# Patient Record
Sex: MALE | Race: WHITE | Employment: OTHER | URBAN - METROPOLITAN AREA
[De-identification: names, ages, dates, MRNs, and addresses within clinical notes are randomized per-mention and may not be internally consistent; named-entity substitution may affect disease eponyms.]

---

## 2016-07-21 LAB
CHOLESTEROL, TOTAL: 182 MG/DL
CHOLESTEROL/HDL RATIO: 5.1
HDLC SERPL-MCNC: 36 MG/DL (ref 35–70)
LDL CHOLESTEROL CALCULATED: 108 MG/DL (ref 0–160)
TRIGL SERPL-MCNC: 191 MG/DL
VLDLC SERPL CALC-MCNC: 38 MG/DL

## 2017-01-20 LAB
ALBUMIN SERPL-MCNC: 4.3 G/DL
ALP BLD-CCNC: 72 U/L
ALT SERPL-CCNC: 17 U/L
AST SERPL-CCNC: 16 U/L
BILIRUB SERPL-MCNC: 0.3 MG/DL (ref 0.1–1.4)
BUN BLDV-MCNC: 15 MG/DL
CALCIUM SERPL-MCNC: 9.8 MG/DL
CHLORIDE BLD-SCNC: 104 MMOL/L
CHOLESTEROL, TOTAL: 175 MG/DL
CHOLESTEROL/HDL RATIO: 5.47
CO2: 23 MMOL/L
CREAT SERPL-MCNC: 0.9 MG/DL
CREATININE, URINE: 147.5
GFR CALCULATED: 60
GLUCOSE BLD-MCNC: 191 MG/DL
HBA1C MFR BLD: 7.4 %
HDLC SERPL-MCNC: 32 MG/DL (ref 35–70)
LDL CHOLESTEROL CALCULATED: 87 MG/DL (ref 0–160)
MICROALBUMIN/CREAT 24H UR: 18.6 MG/G{CREAT}
MICROALBUMIN/CREAT UR-RTO: 13
POTASSIUM SERPL-SCNC: 4.4 MMOL/L
SODIUM BLD-SCNC: 141 MMOL/L
TOTAL PROTEIN: 6.9
TRIGL SERPL-MCNC: 282 MG/DL
VLDLC SERPL CALC-MCNC: 56 MG/DL

## 2017-01-24 DIAGNOSIS — E78.2 MIXED HYPERLIPIDEMIA: ICD-10-CM

## 2017-01-24 DIAGNOSIS — I10 ESSENTIAL HYPERTENSION: ICD-10-CM

## 2017-01-24 DIAGNOSIS — E11.9 TYPE 2 DIABETES MELLITUS WITHOUT COMPLICATION, WITHOUT LONG-TERM CURRENT USE OF INSULIN (HCC): ICD-10-CM

## 2017-01-27 ENCOUNTER — OFFICE VISIT (OUTPATIENT)
Dept: FAMILY MEDICINE CLINIC | Age: 72
End: 2017-01-27

## 2017-01-27 VITALS
TEMPERATURE: 97.6 F | HEART RATE: 84 BPM | WEIGHT: 231.2 LBS | HEIGHT: 73 IN | RESPIRATION RATE: 16 BRPM | DIASTOLIC BLOOD PRESSURE: 72 MMHG | BODY MASS INDEX: 30.64 KG/M2 | SYSTOLIC BLOOD PRESSURE: 130 MMHG

## 2017-01-27 DIAGNOSIS — E78.2 MIXED HYPERLIPIDEMIA: ICD-10-CM

## 2017-01-27 DIAGNOSIS — E11.9 TYPE 2 DIABETES MELLITUS WITHOUT COMPLICATION, WITHOUT LONG-TERM CURRENT USE OF INSULIN (HCC): Primary | ICD-10-CM

## 2017-01-27 DIAGNOSIS — R80.9 MICROALBUMINURIA: ICD-10-CM

## 2017-01-27 DIAGNOSIS — I10 ESSENTIAL HYPERTENSION: ICD-10-CM

## 2017-01-27 PROCEDURE — 99214 OFFICE O/P EST MOD 30 MIN: CPT | Performed by: FAMILY MEDICINE

## 2017-01-27 RX ORDER — OMEPRAZOLE 40 MG/1
40 CAPSULE, DELAYED RELEASE ORAL DAILY
Qty: 30 CAPSULE | Refills: 5 | Status: SHIPPED | OUTPATIENT
Start: 2017-01-27 | End: 2017-04-27 | Stop reason: SDUPTHER

## 2017-02-13 RX ORDER — ETODOLAC 400 MG/1
TABLET, FILM COATED ORAL
Qty: 60 TABLET | Refills: 3 | OUTPATIENT
Start: 2017-02-13

## 2017-04-21 LAB
ALBUMIN SERPL-MCNC: 4.3 G/DL
ALP BLD-CCNC: 62 U/L
ALT SERPL-CCNC: 21 U/L
AST SERPL-CCNC: 19 U/L
BILIRUB SERPL-MCNC: 0.4 MG/DL (ref 0.1–1.4)
BUN BLDV-MCNC: 16 MG/DL
CALCIUM SERPL-MCNC: 9.4 MG/DL
CHLORIDE BLD-SCNC: 103 MMOL/L
CHOLESTEROL, TOTAL: 194 MG/DL
CHOLESTEROL/HDL RATIO: 5.71
CO2: 24 MMOL/L
CREAT SERPL-MCNC: 0.96 MG/DL
GFR CALCULATED: >60
GLUCOSE BLD-MCNC: 173 MG/DL
HBA1C MFR BLD: 7.4 %
HDLC SERPL-MCNC: 34 MG/DL (ref 35–70)
LDL CHOLESTEROL CALCULATED: 109 MG/DL (ref 0–160)
POTASSIUM SERPL-SCNC: 4.4 MMOL/L
SODIUM BLD-SCNC: 140 MMOL/L
TOTAL PROTEIN: 6.8
TRIGL SERPL-MCNC: 256 MG/DL
VLDLC SERPL CALC-MCNC: 51 MG/DL

## 2017-04-25 DIAGNOSIS — I10 ESSENTIAL HYPERTENSION: ICD-10-CM

## 2017-04-25 DIAGNOSIS — E78.2 MIXED HYPERLIPIDEMIA: ICD-10-CM

## 2017-04-25 DIAGNOSIS — E11.9 TYPE 2 DIABETES MELLITUS WITHOUT COMPLICATION, WITHOUT LONG-TERM CURRENT USE OF INSULIN (HCC): ICD-10-CM

## 2017-04-27 ENCOUNTER — OFFICE VISIT (OUTPATIENT)
Dept: FAMILY MEDICINE CLINIC | Age: 72
End: 2017-04-27

## 2017-04-27 VITALS
HEART RATE: 76 BPM | DIASTOLIC BLOOD PRESSURE: 70 MMHG | SYSTOLIC BLOOD PRESSURE: 122 MMHG | RESPIRATION RATE: 16 BRPM | HEIGHT: 73 IN | TEMPERATURE: 97.7 F | WEIGHT: 235.8 LBS | BODY MASS INDEX: 31.25 KG/M2

## 2017-04-27 DIAGNOSIS — E78.2 MIXED HYPERLIPIDEMIA: ICD-10-CM

## 2017-04-27 DIAGNOSIS — M54.12 CERVICAL RADICULOPATHY: ICD-10-CM

## 2017-04-27 DIAGNOSIS — I10 ESSENTIAL HYPERTENSION: ICD-10-CM

## 2017-04-27 DIAGNOSIS — E11.9 TYPE 2 DIABETES MELLITUS WITHOUT COMPLICATION, WITHOUT LONG-TERM CURRENT USE OF INSULIN (HCC): Primary | ICD-10-CM

## 2017-04-27 PROCEDURE — 99214 OFFICE O/P EST MOD 30 MIN: CPT | Performed by: FAMILY MEDICINE

## 2017-04-27 RX ORDER — ETODOLAC 400 MG/1
400 TABLET, FILM COATED ORAL 2 TIMES DAILY
COMMUNITY
Start: 2017-04-16

## 2017-04-27 RX ORDER — OMEPRAZOLE 40 MG/1
40 CAPSULE, DELAYED RELEASE ORAL DAILY
Qty: 30 CAPSULE | Refills: 5 | Status: SHIPPED | OUTPATIENT
Start: 2017-04-27 | End: 2017-10-26 | Stop reason: SDUPTHER

## 2017-04-27 RX ORDER — ALLOPURINOL 300 MG/1
300 TABLET ORAL DAILY
Qty: 30 TABLET | Refills: 5 | Status: SHIPPED | OUTPATIENT
Start: 2017-04-27 | End: 2017-10-26 | Stop reason: SDUPTHER

## 2017-04-27 RX ORDER — PRAVASTATIN SODIUM 80 MG/1
80 TABLET ORAL DAILY
Qty: 90 TABLET | Refills: 2 | Status: SHIPPED | OUTPATIENT
Start: 2017-04-27 | End: 2018-02-26 | Stop reason: SDUPTHER

## 2017-04-27 RX ORDER — NIACIN 1000 MG/1
1000 TABLET, EXTENDED RELEASE ORAL NIGHTLY
Qty: 30 TABLET | Refills: 5 | Status: SHIPPED | OUTPATIENT
Start: 2017-04-27 | End: 2017-10-26 | Stop reason: SDUPTHER

## 2017-04-27 RX ORDER — FENOFIBRATE 160 MG/1
160 TABLET ORAL DAILY
Qty: 30 TABLET | Refills: 5 | Status: SHIPPED | OUTPATIENT
Start: 2017-04-27 | End: 2017-10-26 | Stop reason: SDUPTHER

## 2017-04-27 RX ORDER — LISINOPRIL 2.5 MG/1
TABLET ORAL
Qty: 90 TABLET | Refills: 1 | Status: SHIPPED | OUTPATIENT
Start: 2017-04-27 | End: 2017-10-26 | Stop reason: SDUPTHER

## 2017-04-27 ASSESSMENT — PATIENT HEALTH QUESTIONNAIRE - PHQ9
1. LITTLE INTEREST OR PLEASURE IN DOING THINGS: 0
2. FEELING DOWN, DEPRESSED OR HOPELESS: 0
SUM OF ALL RESPONSES TO PHQ QUESTIONS 1-9: 0
SUM OF ALL RESPONSES TO PHQ9 QUESTIONS 1 & 2: 0

## 2017-05-02 ENCOUNTER — HOSPITAL ENCOUNTER (OUTPATIENT)
Dept: PHYSICAL THERAPY | Age: 72
Setting detail: THERAPIES SERIES
Discharge: HOME | End: 2017-05-02
Payer: COMMERCIAL

## 2017-05-02 PROCEDURE — G8984 CARRY CURRENT STATUS: HCPCS

## 2017-05-02 PROCEDURE — G8985 CARRY GOAL STATUS: HCPCS

## 2017-05-02 PROCEDURE — 97162 PT EVAL MOD COMPLEX 30 MIN: CPT

## 2017-05-02 ASSESSMENT — PAIN DESCRIPTION - PAIN TYPE: TYPE: CHRONIC PAIN

## 2017-05-02 ASSESSMENT — PAIN DESCRIPTION - ORIENTATION: ORIENTATION: LEFT

## 2017-05-02 ASSESSMENT — PAIN SCALES - GENERAL: PAINLEVEL_OUTOF10: 2

## 2017-05-02 ASSESSMENT — PAIN DESCRIPTION - LOCATION: LOCATION: NECK;SHOULDER

## 2017-05-02 ASSESSMENT — PAIN DESCRIPTION - DESCRIPTORS: DESCRIPTORS: ACHING

## 2017-05-16 ENCOUNTER — HOSPITAL ENCOUNTER (OUTPATIENT)
Dept: PHYSICAL THERAPY | Age: 72
Discharge: HOME | End: 2017-05-16

## 2017-05-18 ENCOUNTER — CLINICAL DOCUMENTATION (OUTPATIENT)
Dept: PHYSICAL THERAPY | Age: 72
End: 2017-05-18

## 2017-05-23 ENCOUNTER — CLINICAL DOCUMENTATION (OUTPATIENT)
Dept: PHYSICAL THERAPY | Age: 72
End: 2017-05-23

## 2017-06-27 RX ORDER — GLIPIZIDE 10 MG/1
TABLET ORAL
Qty: 60 TABLET | OUTPATIENT
Start: 2017-06-27

## 2017-06-27 RX ORDER — PRAVASTATIN SODIUM 80 MG/1
TABLET ORAL
Qty: 90 TABLET | OUTPATIENT
Start: 2017-06-27

## 2017-06-30 RX ORDER — GLIPIZIDE 10 MG/1
TABLET ORAL
Qty: 60 TABLET | Refills: 8 | Status: SHIPPED | OUTPATIENT
Start: 2017-06-30 | End: 2018-02-26 | Stop reason: SDUPTHER

## 2017-07-21 LAB
ALBUMIN SERPL-MCNC: 4.1 G/DL
ALP BLD-CCNC: 58 U/L
ALT SERPL-CCNC: 19 U/L
ANION GAP SERPL CALCULATED.3IONS-SCNC: 15 MMOL/L
AST SERPL-CCNC: 16 U/L
AVERAGE GLUCOSE: 155
BILIRUB SERPL-MCNC: 0.9 MG/DL (ref 0.1–1.4)
BUN BLDV-MCNC: NORMAL MG/DL
CALCIUM SERPL-MCNC: 9.9 MG/DL
CHLORIDE BLD-SCNC: 104 MMOL/L
CHOLESTEROL, TOTAL: 182 MG/DL
CHOLESTEROL/HDL RATIO: 5.05
CO2: 26 MMOL/L
CREAT SERPL-MCNC: 1.26 MG/DL
GFR CALCULATED: 57
GLUCOSE BLD-MCNC: 155 MG/DL
HBA1C MFR BLD: 7.3 %
HDLC SERPL-MCNC: 36 MG/DL (ref 35–70)
LDL CHOLESTEROL CALCULATED: 108 MG/DL (ref 0–160)
POTASSIUM SERPL-SCNC: 3.9 MMOL/L
SODIUM BLD-SCNC: 141 MMOL/L
TOTAL PROTEIN: 6.6
TRIGL SERPL-MCNC: 191 MG/DL
VLDLC SERPL CALC-MCNC: 38 MG/DL

## 2017-07-24 DIAGNOSIS — E78.2 MIXED HYPERLIPIDEMIA: ICD-10-CM

## 2017-07-24 DIAGNOSIS — E11.9 TYPE 2 DIABETES MELLITUS WITHOUT COMPLICATION, WITHOUT LONG-TERM CURRENT USE OF INSULIN (HCC): ICD-10-CM

## 2017-07-24 DIAGNOSIS — I10 ESSENTIAL HYPERTENSION: ICD-10-CM

## 2017-07-28 ENCOUNTER — OFFICE VISIT (OUTPATIENT)
Dept: FAMILY MEDICINE CLINIC | Age: 72
End: 2017-07-28

## 2017-07-28 VITALS
HEIGHT: 73 IN | WEIGHT: 235.6 LBS | HEART RATE: 84 BPM | SYSTOLIC BLOOD PRESSURE: 128 MMHG | BODY MASS INDEX: 31.23 KG/M2 | DIASTOLIC BLOOD PRESSURE: 66 MMHG | TEMPERATURE: 97.2 F | RESPIRATION RATE: 18 BRPM

## 2017-07-28 DIAGNOSIS — E78.2 MIXED HYPERLIPIDEMIA: ICD-10-CM

## 2017-07-28 DIAGNOSIS — I10 ESSENTIAL HYPERTENSION: ICD-10-CM

## 2017-07-28 DIAGNOSIS — E11.9 TYPE 2 DIABETES MELLITUS WITHOUT COMPLICATION, WITHOUT LONG-TERM CURRENT USE OF INSULIN (HCC): Primary | ICD-10-CM

## 2017-07-28 PROCEDURE — 3288F FALL RISK ASSESSMENT DOCD: CPT | Performed by: FAMILY MEDICINE

## 2017-07-28 PROCEDURE — G8510 SCR DEP NEG, NO PLAN REQD: HCPCS | Performed by: FAMILY MEDICINE

## 2017-07-28 PROCEDURE — 99214 OFFICE O/P EST MOD 30 MIN: CPT | Performed by: FAMILY MEDICINE

## 2017-07-28 ASSESSMENT — PATIENT HEALTH QUESTIONNAIRE - PHQ9
SUM OF ALL RESPONSES TO PHQ QUESTIONS 1-9: 0
2. FEELING DOWN, DEPRESSED OR HOPELESS: 0
1. LITTLE INTEREST OR PLEASURE IN DOING THINGS: 0
SUM OF ALL RESPONSES TO PHQ9 QUESTIONS 1 & 2: 0

## 2017-10-12 LAB
ALBUMIN SERPL-MCNC: 4.1 G/DL
ALP BLD-CCNC: 67 U/L
ALT SERPL-CCNC: 22 U/L
ANION GAP SERPL CALCULATED.3IONS-SCNC: 12 MMOL/L
AST SERPL-CCNC: 18 U/L
AVERAGE GLUCOSE: NORMAL
BILIRUB SERPL-MCNC: 0.7 MG/DL (ref 0.1–1.4)
BUN BLDV-MCNC: NORMAL MG/DL
CALCIUM SERPL-MCNC: 9.7 MG/DL
CHLORIDE BLD-SCNC: 104 MMOL/L
CHOLESTEROL, TOTAL: 195 MG/DL
CHOLESTEROL/HDL RATIO: 7
CO2: NORMAL MMOL/L
CREAT SERPL-MCNC: 0.9 MG/DL
GFR CALCULATED: >60
GLUCOSE BLD-MCNC: 207 MG/DL
HBA1C MFR BLD: 7.1 %
HDLC SERPL-MCNC: 28 MG/DL (ref 35–70)
LDL CHOLESTEROL CALCULATED: 109 MG/DL (ref 0–160)
POTASSIUM SERPL-SCNC: 3.9 MMOL/L
SODIUM BLD-SCNC: 139 MMOL/L
TOTAL PROTEIN: 6.6
TRIGL SERPL-MCNC: 438 MG/DL
VLDLC SERPL CALC-MCNC: ABNORMAL MG/DL

## 2017-10-13 DIAGNOSIS — I10 ESSENTIAL HYPERTENSION: ICD-10-CM

## 2017-10-13 DIAGNOSIS — E11.9 TYPE 2 DIABETES MELLITUS WITHOUT COMPLICATION, WITHOUT LONG-TERM CURRENT USE OF INSULIN (HCC): ICD-10-CM

## 2017-10-13 DIAGNOSIS — E78.2 MIXED HYPERLIPIDEMIA: ICD-10-CM

## 2017-10-26 ENCOUNTER — OFFICE VISIT (OUTPATIENT)
Dept: FAMILY MEDICINE CLINIC | Age: 72
End: 2017-10-26

## 2017-10-26 VITALS
HEART RATE: 74 BPM | BODY MASS INDEX: 31.54 KG/M2 | TEMPERATURE: 97.1 F | WEIGHT: 238 LBS | RESPIRATION RATE: 14 BRPM | DIASTOLIC BLOOD PRESSURE: 70 MMHG | HEIGHT: 73 IN | SYSTOLIC BLOOD PRESSURE: 138 MMHG

## 2017-10-26 DIAGNOSIS — R80.9 MICROALBUMINURIA: ICD-10-CM

## 2017-10-26 DIAGNOSIS — I10 ESSENTIAL HYPERTENSION: ICD-10-CM

## 2017-10-26 DIAGNOSIS — Z23 NEED FOR INFLUENZA VACCINATION: ICD-10-CM

## 2017-10-26 DIAGNOSIS — E78.2 MIXED HYPERLIPIDEMIA: ICD-10-CM

## 2017-10-26 DIAGNOSIS — E11.9 TYPE 2 DIABETES MELLITUS WITHOUT COMPLICATION, WITHOUT LONG-TERM CURRENT USE OF INSULIN (HCC): Primary | ICD-10-CM

## 2017-10-26 PROCEDURE — 90471 IMMUNIZATION ADMIN: CPT | Performed by: FAMILY MEDICINE

## 2017-10-26 PROCEDURE — 90662 IIV NO PRSV INCREASED AG IM: CPT | Performed by: FAMILY MEDICINE

## 2017-10-26 PROCEDURE — 99214 OFFICE O/P EST MOD 30 MIN: CPT | Performed by: FAMILY MEDICINE

## 2017-10-26 RX ORDER — ALLOPURINOL 300 MG/1
300 TABLET ORAL DAILY
Qty: 30 TABLET | Refills: 5 | Status: SHIPPED | OUTPATIENT
Start: 2017-10-26 | End: 2018-02-26 | Stop reason: SDUPTHER

## 2017-10-26 RX ORDER — OMEPRAZOLE 40 MG/1
40 CAPSULE, DELAYED RELEASE ORAL DAILY
Qty: 30 CAPSULE | Refills: 5 | Status: SHIPPED | OUTPATIENT
Start: 2017-10-26 | End: 2018-02-26 | Stop reason: SDUPTHER

## 2017-10-26 RX ORDER — LISINOPRIL 2.5 MG/1
TABLET ORAL
Qty: 90 TABLET | Refills: 1 | Status: SHIPPED | OUTPATIENT
Start: 2017-10-26 | End: 2018-02-26 | Stop reason: SDUPTHER

## 2017-10-26 RX ORDER — NIACIN 1000 MG/1
1000 TABLET, EXTENDED RELEASE ORAL NIGHTLY
Qty: 30 TABLET | Refills: 5 | Status: SHIPPED | OUTPATIENT
Start: 2017-10-26 | End: 2018-02-26 | Stop reason: SDUPTHER

## 2017-10-26 RX ORDER — FENOFIBRATE 160 MG/1
160 TABLET ORAL DAILY
Qty: 30 TABLET | Refills: 5 | Status: SHIPPED | OUTPATIENT
Start: 2017-10-26 | End: 2018-02-26 | Stop reason: SDUPTHER

## 2017-10-26 ASSESSMENT — PATIENT HEALTH QUESTIONNAIRE - PHQ9
SUM OF ALL RESPONSES TO PHQ QUESTIONS 1-9: 0
SUM OF ALL RESPONSES TO PHQ9 QUESTIONS 1 & 2: 0
2. FEELING DOWN, DEPRESSED OR HOPELESS: 0
1. LITTLE INTEREST OR PLEASURE IN DOING THINGS: 0

## 2017-10-26 NOTE — PATIENT INSTRUCTIONS
Patient Education        Type 2 Diabetes: Care Instructions  Your Care Instructions  Type 2 diabetes is a disease that develops when the body's tissues cannot use insulin properly. Over time, the pancreas cannot make enough insulin. Insulin is a hormone that helps the body's cells use sugar (glucose) for energy. It also helps the body store extra sugar in muscle, fat, and liver cells. Without insulin, the sugar cannot get into the cells to do its work. It stays in the blood instead. This can cause high blood sugar levels. A person has diabetes when the blood sugar stays too high too much of the time. Over time, diabetes can lead to diseases of the heart, blood vessels, nerves, kidneys, and eyes. You may be able to control your blood sugar by losing weight, eating a healthy diet, and getting daily exercise. You may also have to take insulin or other diabetes medicine. Follow-up care is a key part of your treatment and safety. Be sure to make and go to all appointments. Call your doctor if you are having problems. It's also a good idea to know your test results and keep a list of the medicines you take. How can you care for yourself at home? · Keep your blood sugar at a target level (which you set with your doctor). ¨ Eat a good diet that spreads carbohydrate throughout the day. Carbohydratethe body's main source of fuelaffects blood sugar more than any other nutrient. Carbohydrate is in fruits, vegetables, milk, and yogurt. It also is in breads, cereals, vegetables such as potatoes and corn, and sugary foods such as candy and cakes. ¨ Aim for 30 minutes of exercise on most, preferably all, days of the week. Walking is a good choice. You also may want to do other activities, such as running, swimming, cycling, or playing tennis or team sports. If your doctor says it's okay, do muscle-strengthening exercises at least 2 times a week. ¨ Take your medicines exactly as prescribed.  Call your doctor if you think flu season. Everyone else needs only one dose each flu season. Some inactivated flu vaccines contain a very small amount of a mercury-based preservative called thimerosal. Studies have not shown thimerosal in vaccines to be harmful, but flu vaccines that do not contain thimerosal are available. There is no live flu virus in flu shots. They cannot cause the flu. There are many flu viruses, and they are always changing. Each year a new flu vaccine is made to protect against three or four viruses that are likely to cause disease in the upcoming flu season. But even when the vaccine doesn't exactly match these viruses, it may still provide some protection. Flu vaccine cannot prevent:  · Flu that is caused by a virus not covered by the vaccine. · Illnesses that look like flu but are not. Some people should not get this vaccine  Tell the person who is giving you the vaccine:  · If you have any severe (life-threatening) allergies. If you ever had a life-threatening allergic reaction after a dose of flu vaccine, or have a severe allergy to any part of this vaccine, you may be advised not to get vaccinated. Most, but not all, types of flu vaccine contain a small amount of egg protein. · If you ever had Guillain-Barré syndrome (also called GBS) Some people with a history of GBS should not get this vaccine. This should be discussed with your doctor. · If you are not feeling well. It is usually okay to get flu vaccine when you have a mild illness, but you might be asked to come back when you feel better. Risks of a vaccine reaction  With any medicine, including vaccines, there is a chance of reactions. These are usually mild and go away on their own, but serious reactions are also possible. Most people who get a flu shot do not have any problems with it.   Minor problems following a flu shot include:  · Soreness, redness, or swelling where the shot was given  · Hoarseness  · Sore, red or itchy instruction, always ask your healthcare professional. [place] [address] any warranty or liability for your use of this information.

## 2017-10-26 NOTE — PROGRESS NOTES
shortness of breath and wheezing  Cardiovascular: negative for chest pain, dyspnea, lower extremity edema, orthopnea, palpitations, paroxysmal nocturnal dyspnea and tachypnea  Gastrointestinal: negative for abdominal pain, constipation, diarrhea, dyspepsia, dysphagia, nausea, odynophagia, reflux symptoms and vomiting  Genitourinary:negative for decreased stream, dysuria, frequency, hematuria, hesitancy and urinary incontinence  Integument/breast: negative for dryness, pruritus, rash and skin color change  Hematologic/lymphatic: negative for bleeding, easy bruising and petechiae  Musculoskeletal:negative for arthralgias, back pain, muscle weakness, myalgias, neck pain and stiff joints  Neurological: negative for coordination problems, dizziness, headaches, paresthesia, speech problems, tremors and vertigo       Objective:      /70 (Site: Left Arm, Position: Sitting, Cuff Size: Large Adult)   Pulse 74   Temp 97.1 °F (36.2 °C) (Temporal)   Resp 14   Ht 6' 1.25\" (1.861 m)   Wt 238 lb (108 kg)   BMI 31.19 kg/m²       Well appearing, well nourished patient not in apparent distress. HEENT:   EOMI, PERRLA. No conjunctival pallor or scleral jaundice. Oropharynx reveals no erythema or exudate. TMs normal bilaterally. LYMPHATICS:   no lymphadenopathy. SKIN:  pink, warm and dry with no rash. NECK:  supple, trachea central, no thyromegaly. No JVD Or carotid bruit. CHEST WALL:  no deformity. No chest wall tenderness. LUNGS:  has normal chest wall excursion. Chest wall is symmetrical.   Normal vocal fremitus. Normal tactile fremitus. Has good air entry bilaterally with normal vesicular breath sounds. No rhonchi, rales or wheezes. HEART:   point of maximum impulse is at the 5th left intercostal space, mid clavicular line. No palpable thrill. First and second heart sounds are normal.   No murmur, gallop or rub. ABDOMEN:  non-distended, soft, moves with respiration.   No area of tenderness. No guarding and no rebound tenderness. No CVA tenderness. No palpable intraabdominal mass. Bowel sounds normal.     EXTREMITIES:   no cc or e. NEURO: alert and oriented x3. Cranial nerves II-XII normal with no focal deficit. Has normal gait. MUSCULOSKELETAL:   no joint swelling or deformity noted. Both feet are warm to touch. Dorsalis pedis and posterior tibia pulses are palpable. No ulcers, sores or gangrene. No evidence of critical leg ischemia. Sensation normal in eight point monofilament testing. No deformity or calluses. .    Lab Review  Orders Only on 10/13/2017   Component Date Value Ref Range Status    Sodium 10/12/2017 139  mmol/L Final    Chloride 10/12/2017 104  mmol/L Final    Potassium 10/12/2017 3.9  mmol/L Final    CREATININE 10/12/2017 0.90   Final    Glucose 10/12/2017 207  mg/dL Final    AST 10/12/2017 18  U/L Final    ALT 10/12/2017 22  U/L Final    Calcium 10/12/2017 9.7  mg/dL Final    Total Protein 10/12/2017 6.6   Final    Alb 10/12/2017 4.1   Final    Alkaline Phosphatase 10/12/2017 67  U/L Final    Total Bilirubin 10/12/2017 0.7  0.1 - 1.4 mg/dL Final    Gfr Calculated 10/12/2017 >60   Final    Anion Gap 10/12/2017 12  mmol/L Final    Hemoglobin A1C 10/12/2017 7.1  % Final    Cholesterol, Total 10/12/2017 195  mg/dL Final    HDL 10/12/2017 28* 35 - 70 mg/dL Final    LDL Calculated 10/12/2017 109  0 - 160 mg/dL Final    Triglycerides 10/12/2017 438  mg/dL Final    Chol/HDL Ratio 10/12/2017 7.0   Final    VLDL 10/12/2017   mg/dL Final    CANNOT REPORT        Assessment:     Encounter Diagnoses   Name Primary?     Type 2 diabetes mellitus without complication, without long-term current use of insulin ([facility] [address] Yes    Essential hypertension     Mixed hyperlipidemia     Need for influenza vaccination     Microalbuminuria        Plan:      Outpatient Encounter Prescriptions as of 10/26/2017   Medication Sig Dispense Refill    Expiration Date:   10/27/2018    Lipid Panel     Standing Status:   Future     Standing Expiration Date:   10/27/2018     Order Specific Question:   Is Patient Fasting?/# of Hours     Answer:   8-10    Microalbumin / Creatinine Urine Ratio     Standing Status:   Future     Standing Expiration Date:   10/27/2018       1. Continue dietary measures. 2. Continue regular exercise. 3. Discussed general issues about diabetes pathophysiology and management. Counseling at today's visit: focused on the need for regular aerobic exercise, focused on the need to adhere to the prescribed ADA diet, discussed the advantages of a diet low in carbohydrates, reminded to check sugars regularly and to bring readings in at the time of the next visit, discussed DASH diet and discussed management of hypoglycemic episodes. Addressed ADA diet. Suggested low cholesterol diet. Encouraged aerobic exercise. Discussed foot care. Reminded to get yearly retinal exam.  Discussed ways to avoid symptomatic hypoglycemia. Reminded to bring in blood sugar diary at next visit. Diabetes Counseling   Patient was counseled regarding disease risks and adopting healthy behaviors. Patient was provided education materials to assist with self management. Patient was provided log (or received log during previous visit) to record blood pressure, food intake and/or blood sugar. Patient was instructed to keep log up-to-date and to always bring log to all office visits. Return in about 4 months (around 2/26/2018).

## 2018-02-19 ENCOUNTER — TELEPHONE (OUTPATIENT)
Dept: FAMILY MEDICINE CLINIC | Age: 73
End: 2018-02-19

## 2018-02-19 DIAGNOSIS — E11.9 DIABETIC EYE EXAM (HCC): Primary | ICD-10-CM

## 2018-02-19 DIAGNOSIS — Z01.00 DIABETIC EYE EXAM (HCC): Primary | ICD-10-CM

## 2018-02-19 NOTE — TELEPHONE ENCOUNTER
THIS PATIENT IS IN NEED OF AN EYE EXAM AND WOULD LIKE TO GO TO [facility].  IT IS FOR A DIABETIC EYE EXAM. PLEASE ADVISE          Keena Figueroa  [phone] (home)

## 2018-02-22 LAB
ALBUMIN SERPL-MCNC: 4.1 G/DL
ALP BLD-CCNC: 65 U/L
ALT SERPL-CCNC: 15 U/L
ANION GAP SERPL CALCULATED.3IONS-SCNC: 15 MMOL/L
AST SERPL-CCNC: 13 U/L
AVERAGE GLUCOSE: NORMAL
BILIRUB SERPL-MCNC: 0.6 MG/DL (ref 0.1–1.4)
BUN BLDV-MCNC: 17 MG/DL
CALCIUM SERPL-MCNC: 9.6 MG/DL
CHLORIDE BLD-SCNC: 102 MMOL/L
CHOLESTEROL, TOTAL: 200 MG/DL
CHOLESTEROL/HDL RATIO: 5.41
CO2: 25 MMOL/L
CREAT SERPL-MCNC: 0.83 MG/DL
CREATININE, URINE: 242.6
GFR CALCULATED: >60
GLUCOSE BLD-MCNC: 176 MG/DL
HBA1C MFR BLD: 7.6 %
HDLC SERPL-MCNC: 37 MG/DL (ref 35–70)
LDL CHOLESTEROL CALCULATED: 108 MG/DL (ref 0–160)
MICROALBUMIN/CREAT 24H UR: 155.7 MG/G{CREAT}
MICROALBUMIN/CREAT UR-RTO: 64
POTASSIUM SERPL-SCNC: 3.8 MMOL/L
SODIUM BLD-SCNC: 142 MMOL/L
TOTAL PROTEIN: 7.1
TRIGL SERPL-MCNC: 276 MG/DL
VLDLC SERPL CALC-MCNC: 55 MG/DL

## 2018-02-23 DIAGNOSIS — E78.2 MIXED HYPERLIPIDEMIA: ICD-10-CM

## 2018-02-23 DIAGNOSIS — E11.9 TYPE 2 DIABETES MELLITUS WITHOUT COMPLICATION, WITHOUT LONG-TERM CURRENT USE OF INSULIN (HCC): ICD-10-CM

## 2018-02-23 DIAGNOSIS — I10 ESSENTIAL HYPERTENSION: ICD-10-CM

## 2018-02-26 ENCOUNTER — OFFICE VISIT (OUTPATIENT)
Dept: FAMILY MEDICINE CLINIC | Age: 73
End: 2018-02-26
Payer: COMMERCIAL

## 2018-02-26 VITALS
SYSTOLIC BLOOD PRESSURE: 128 MMHG | TEMPERATURE: 97.1 F | WEIGHT: 233.2 LBS | HEIGHT: 73 IN | RESPIRATION RATE: 18 BRPM | DIASTOLIC BLOOD PRESSURE: 60 MMHG | HEART RATE: 84 BPM | BODY MASS INDEX: 30.91 KG/M2

## 2018-02-26 DIAGNOSIS — E11.9 TYPE 2 DIABETES MELLITUS WITHOUT COMPLICATION, WITHOUT LONG-TERM CURRENT USE OF INSULIN (HCC): Primary | ICD-10-CM

## 2018-02-26 DIAGNOSIS — I10 ESSENTIAL HYPERTENSION: ICD-10-CM

## 2018-02-26 DIAGNOSIS — R80.9 MICROALBUMINURIA: ICD-10-CM

## 2018-02-26 DIAGNOSIS — E78.2 MIXED HYPERLIPIDEMIA: ICD-10-CM

## 2018-02-26 PROCEDURE — 99214 OFFICE O/P EST MOD 30 MIN: CPT | Performed by: FAMILY MEDICINE

## 2018-02-26 RX ORDER — NIACIN 1000 MG/1
1000 TABLET, EXTENDED RELEASE ORAL NIGHTLY
Qty: 30 TABLET | Refills: 5 | Status: SHIPPED | OUTPATIENT
Start: 2018-02-26 | End: 2018-08-30 | Stop reason: SDUPTHER

## 2018-02-26 RX ORDER — ALLOPURINOL 300 MG/1
300 TABLET ORAL DAILY
Qty: 30 TABLET | Refills: 5 | Status: SHIPPED | OUTPATIENT
Start: 2018-02-26 | End: 2018-08-30 | Stop reason: SDUPTHER

## 2018-02-26 RX ORDER — PRAVASTATIN SODIUM 80 MG/1
80 TABLET ORAL DAILY
Qty: 90 TABLET | Refills: 2 | Status: SHIPPED | OUTPATIENT
Start: 2018-02-26 | End: 2018-08-30 | Stop reason: SDUPTHER

## 2018-02-26 RX ORDER — GLIPIZIDE 10 MG/1
10 TABLET ORAL
Qty: 60 TABLET | Refills: 8 | Status: SHIPPED | OUTPATIENT
Start: 2018-02-26 | End: 2018-11-30 | Stop reason: SDUPTHER

## 2018-02-26 RX ORDER — LISINOPRIL 2.5 MG/1
TABLET ORAL
Qty: 90 TABLET | Refills: 1 | Status: SHIPPED | OUTPATIENT
Start: 2018-02-26 | End: 2018-08-30 | Stop reason: SDUPTHER

## 2018-02-26 RX ORDER — OMEPRAZOLE 40 MG/1
40 CAPSULE, DELAYED RELEASE ORAL DAILY
Qty: 30 CAPSULE | Refills: 5 | Status: SHIPPED | OUTPATIENT
Start: 2018-02-26 | End: 2018-08-30 | Stop reason: SDUPTHER

## 2018-02-26 RX ORDER — FENOFIBRATE 160 MG/1
160 TABLET ORAL DAILY
Qty: 30 TABLET | Refills: 5 | Status: SHIPPED | OUTPATIENT
Start: 2018-02-26 | End: 2018-08-30 | Stop reason: SDUPTHER

## 2018-02-26 ASSESSMENT — PATIENT HEALTH QUESTIONNAIRE - PHQ9
SUM OF ALL RESPONSES TO PHQ9 QUESTIONS 1 & 2: 0
2. FEELING DOWN, DEPRESSED OR HOPELESS: 0
1. LITTLE INTEREST OR PLEASURE IN DOING THINGS: 0
SUM OF ALL RESPONSES TO PHQ QUESTIONS 1-9: 0

## 2018-02-26 NOTE — PROGRESS NOTES
Chief Complaint   Patient presents with    3 Month Follow-Up     f/u on dm, htn, hyperlipidemia and labs        Tushar Merino is here for follow up of elevated cholesterol, elevated blood pressure, and diabetes. Compliance with treatment has been good. Patient denies muscle pain associated with his medications. He is exercising and is adherent to a low-salt diet. Blood pressure is well controlled at home. Cardiac symptoms: none. Patient denies: chest pain, claudication, dyspnea, exertional chest pressure/discomfort, fatigue, lower extremity edema, near-syncope, orthopnea, palpitations, paroxysmal nocturnal dyspnea and syncope. Cardiovascular risk factors: advanced age (older than 54 for men, 72 for women), diabetes mellitus, dyslipidemia, hypertension, male gender, microalbuminuria and obesity (BMI >= 30 kg/m2). . Current diabetic symptoms include: none. Patient denies foot ulcerations, hyperglycemia, hypoglycemia , increase appetite, nausea, paresthesia of the feet, polydipsia, polyuria, visual disturbances, vomitting and weight loss. Evaluation to date has included: fasting blood sugar, fasting lipid panel, hemoglobin A1C and microalbuminuria. Past Medical History:   Diagnosis Date    Hyperlipidemia     Hypertension     Microalbuminuria 4/22/2016    Type II or unspecified type diabetes mellitus without mention of complication, not stated as uncontrolled      Patient Active Problem List    Diagnosis Date Noted    Type 2 diabetes mellitus without complication, without long-term current use of insulin [facility] 07/27/2016    Microalbuminuria 04/22/2016    Essential hypertension 01/22/2016    Gout 12/27/2012    Mixed hyperlipidemia      Past Surgical History:   Procedure Laterality Date    COLONOSCOPY  5/1/14    DR. ELMORE    HERNIA REPAIR      UPPER GASTROINTESTINAL ENDOSCOPY  5/1/14    DR. ELMORE     Family History   Problem Relation Age of Onset    Arthritis Mother     Cancer Father      colon   

## 2018-03-06 ENCOUNTER — TELEPHONE (OUTPATIENT)
Dept: FAMILY MEDICINE CLINIC | Age: 73
End: 2018-03-06

## 2018-03-06 DIAGNOSIS — M19.90 ARTHRITIS: Primary | ICD-10-CM

## 2018-03-07 ENCOUNTER — TELEPHONE (OUTPATIENT)
Dept: FAMILY MEDICINE CLINIC | Age: 73
End: 2018-03-07

## 2018-03-07 NOTE — TELEPHONE ENCOUNTER
Samples of januvia here in office for patient to . Dispensed Januvia 100mg YLB#[id number] exp 6/2020 qty:70 tabs.  Left message on machine at [phone] advising patient samples were available for him to  from the

## 2018-03-29 ENCOUNTER — TELEPHONE (OUTPATIENT)
Dept: FAMILY MEDICINE CLINIC | Age: 73
End: 2018-03-29

## 2018-05-22 LAB
A/G RATIO: 1.6 (ref 0.9–2.4)
ALBUMIN: 4.4 G/DL (ref 3.4–5)
ALP BLD-CCNC: 76 U/L (ref 45–117)
ALT SERPL-CCNC: 23 U/L (ref 10–52)
ANION GAP SERPL CALCULATED.3IONS-SCNC: 14 MMOL/L (ref 10–20)
AST SERPL-CCNC: 20 U/L (ref 13–39)
BICARBONATE: 27 MMOL/L (ref 21–32)
BILIRUB SERPL-MCNC: 0.6 MG/DL (ref 0–1.2)
BUN / CREAT RATIO: 18 (ref 5–25)
CALCIUM SERPL-MCNC: 9.7 MG/DL (ref 8.6–10.3)
CHLORIDE BLD-SCNC: 104 MMOL/L (ref 98–107)
CHOLESTEROL/HDL RATIO: 5.5
CHOLESTEROL: 215 MG/DL
CREAT SERPL-MCNC: 0.83 MG/DL (ref 0.5–1.3)
GFR CALCULATED: >60
GLUCOSE: 206 MG/DL (ref 70–100)
HBA1C MFR BLD: 8.6 % (ref 4–6)
HDLC SERPL-MCNC: 39 MG/DL
LDL CHOLESTEROL CALCULATED: 119 MG/DL
POTASSIUM SERPL-SCNC: 4.2 MMOL/L (ref 3.5–5.1)
SODIUM BLD-SCNC: 141 MMOL/L (ref 136–145)
TOTAL PROTEIN: 7.2 G/DL (ref 6.4–8.2)
TRIGL SERPL-MCNC: 285 MG/DL
UREA NITROGEN: 15 MG/DL (ref 6–23)
VLDLC SERPL CALC-MCNC: 57 MG/DL

## 2018-05-29 ENCOUNTER — OFFICE VISIT (OUTPATIENT)
Dept: FAMILY MEDICINE CLINIC | Age: 73
End: 2018-05-29
Payer: COMMERCIAL

## 2018-05-29 VITALS
BODY MASS INDEX: 31.01 KG/M2 | HEART RATE: 68 BPM | HEIGHT: 73 IN | TEMPERATURE: 96.5 F | WEIGHT: 234 LBS | SYSTOLIC BLOOD PRESSURE: 126 MMHG | RESPIRATION RATE: 16 BRPM | DIASTOLIC BLOOD PRESSURE: 74 MMHG

## 2018-05-29 DIAGNOSIS — E11.9 TYPE 2 DIABETES MELLITUS WITHOUT COMPLICATION, WITHOUT LONG-TERM CURRENT USE OF INSULIN (HCC): Primary | ICD-10-CM

## 2018-05-29 DIAGNOSIS — E78.2 MIXED HYPERLIPIDEMIA: ICD-10-CM

## 2018-05-29 DIAGNOSIS — I10 ESSENTIAL HYPERTENSION: ICD-10-CM

## 2018-05-29 PROCEDURE — 99214 OFFICE O/P EST MOD 30 MIN: CPT | Performed by: FAMILY MEDICINE

## 2018-05-29 ASSESSMENT — PATIENT HEALTH QUESTIONNAIRE - PHQ9
SUM OF ALL RESPONSES TO PHQ9 QUESTIONS 1 & 2: 0
SUM OF ALL RESPONSES TO PHQ QUESTIONS 1-9: 0
1. LITTLE INTEREST OR PLEASURE IN DOING THINGS: 0
2. FEELING DOWN, DEPRESSED OR HOPELESS: 0

## 2018-08-23 LAB
A/G RATIO: 1.6 (ref 0.9–2.4)
ALBUMIN: 4.1 G/DL (ref 3.4–5)
ALP BLD-CCNC: 73 U/L (ref 45–117)
ALT SERPL-CCNC: 21 U/L (ref 10–52)
ANION GAP SERPL CALCULATED.3IONS-SCNC: 11 MMOL/L (ref 10–20)
AST SERPL-CCNC: 16 U/L (ref 13–39)
BASOPHILS # BLD: 0.8 % (ref 0–1.3)
BASOPHILS ABSOLUTE: 0.04 10*3/UL (ref 0.01–0.09)
BICARBONATE: 27 MMOL/L (ref 21–32)
BILIRUB SERPL-MCNC: 0.5 MG/DL (ref 0–1.2)
BUN / CREAT RATIO: 21 (ref 5–25)
CALCIUM SERPL-MCNC: 9.5 MG/DL (ref 8.6–10.3)
CHLORIDE BLD-SCNC: 104 MMOL/L (ref 98–107)
CHOLESTEROL/HDL RATIO: 6.7
CHOLESTEROL: 201 MG/DL
CREAT SERPL-MCNC: 0.82 MG/DL (ref 0.5–1.3)
EOSINOPHIL # BLD: 2.3 % (ref 0–6.7)
EOSINOPHILS ABSOLUTE: 0.11 10*3/UL (ref 0.01–0.46)
ERYTHROCYTE [DISTWIDTH] IN BLOOD BY AUTOMATED COUNT: 11.9 % (ref 12–15.4)
ERYTHROCYTE [DISTWIDTH] IN BLOOD BY AUTOMATED COUNT: 38.7 FL (ref 39.3–48.6)
GFR CALCULATED: >60
GLUCOSE: 180 MG/DL (ref 70–100)
HBA1C MFR BLD: 7.7 % (ref 4–6)
HCT VFR BLD CALC: 39.7 % (ref 38.4–54.9)
HDLC SERPL-MCNC: 30 MG/DL
HEMOGLOBIN: 13.8 G/DL (ref 12.8–17.7)
IMMATURE GRANS (ABS): 0.04 10*3/UL (ref 0–0.21)
IMMATURE GRANULOCYTES: 0.8 %
LDL CHOLESTEROL CALCULATED: 96 MG/DL
LYMPHOCYTES # BLD: 28 % (ref 9.4–41.1)
LYMPHOCYTES ABSOLUTE: 1.35 10*3/UL (ref 0.4–2.84)
MCH RBC QN AUTO: 30.7 PG (ref 27.5–32.9)
MCHC RBC AUTO-ENTMCNC: 34.8 G/DL (ref 30.5–35.4)
MCV RBC AUTO: 88.4 FL (ref 83.3–98.2)
MONOCYTES # BLD: 9.7 % (ref 3–16.2)
MONOCYTES ABSOLUTE: 0.47 10*3/UL (ref 0.25–1.33)
NEUTROPHILS ABSOLUTE: 2.82 10*3/UL (ref 2.22–7.53)
NEUTROPHILS: 58.4 % (ref 46.2–79.1)
NUCLEATED RBCS: 0 /100{WBCS}
PLATELET # BLD: 194 10*3/UL (ref 155–404)
PMV BLD AUTO: 10.8 FL (ref 9.9–12.1)
POTASSIUM SERPL-SCNC: 4 MMOL/L (ref 3.5–5.1)
RBC: 4.49 10*6/UL (ref 4.08–6.37)
RBCS COUNTED: 0 10*3/UL
SODIUM BLD-SCNC: 138 MMOL/L (ref 136–145)
TOTAL PROTEIN: 6.7 G/DL (ref 6.4–8.2)
TRIGL SERPL-MCNC: 373 MG/DL
UREA NITROGEN: 17 MG/DL (ref 6–23)
VLDLC SERPL CALC-MCNC: 75 MG/DL
WBC: 4.8 10*3/UL (ref 4.2–11)

## 2018-08-30 ENCOUNTER — OFFICE VISIT (OUTPATIENT)
Dept: FAMILY MEDICINE CLINIC | Age: 73
End: 2018-08-30
Payer: COMMERCIAL

## 2018-08-30 VITALS
BODY MASS INDEX: 31.07 KG/M2 | HEIGHT: 73 IN | DIASTOLIC BLOOD PRESSURE: 66 MMHG | WEIGHT: 234.4 LBS | TEMPERATURE: 97.2 F | RESPIRATION RATE: 16 BRPM | HEART RATE: 76 BPM | SYSTOLIC BLOOD PRESSURE: 138 MMHG

## 2018-08-30 DIAGNOSIS — M1A.00X0 CHRONIC IDIOPATHIC GOUT: ICD-10-CM

## 2018-08-30 DIAGNOSIS — K21.9 GASTROESOPHAGEAL REFLUX DISEASE, ESOPHAGITIS PRESENCE NOT SPECIFIED: ICD-10-CM

## 2018-08-30 DIAGNOSIS — E11.9 TYPE 2 DIABETES MELLITUS WITHOUT COMPLICATION, WITHOUT LONG-TERM CURRENT USE OF INSULIN (HCC): Primary | ICD-10-CM

## 2018-08-30 DIAGNOSIS — I10 ESSENTIAL HYPERTENSION: ICD-10-CM

## 2018-08-30 DIAGNOSIS — R80.9 MICROALBUMINURIA: ICD-10-CM

## 2018-08-30 DIAGNOSIS — E78.2 MIXED HYPERLIPIDEMIA: ICD-10-CM

## 2018-08-30 PROCEDURE — 3288F FALL RISK ASSESSMENT DOCD: CPT | Performed by: FAMILY MEDICINE

## 2018-08-30 PROCEDURE — G8510 SCR DEP NEG, NO PLAN REQD: HCPCS | Performed by: FAMILY MEDICINE

## 2018-08-30 PROCEDURE — 99214 OFFICE O/P EST MOD 30 MIN: CPT | Performed by: FAMILY MEDICINE

## 2018-08-30 RX ORDER — LISINOPRIL 2.5 MG/1
TABLET ORAL
Qty: 90 TABLET | Refills: 1 | Status: SHIPPED | OUTPATIENT
Start: 2018-08-30 | End: 2018-11-30 | Stop reason: SDUPTHER

## 2018-08-30 RX ORDER — OMEPRAZOLE 40 MG/1
40 CAPSULE, DELAYED RELEASE ORAL DAILY
Qty: 30 CAPSULE | Refills: 5 | Status: SHIPPED | OUTPATIENT
Start: 2018-08-30 | End: 2018-11-30 | Stop reason: DRUGHIGH

## 2018-08-30 RX ORDER — FENOFIBRATE 160 MG/1
160 TABLET ORAL DAILY
Qty: 30 TABLET | Refills: 5 | Status: SHIPPED | OUTPATIENT
Start: 2018-08-30 | End: 2018-11-30 | Stop reason: SDUPTHER

## 2018-08-30 RX ORDER — NIACIN 1000 MG/1
1000 TABLET, EXTENDED RELEASE ORAL NIGHTLY
Qty: 30 TABLET | Refills: 5 | Status: SHIPPED | OUTPATIENT
Start: 2018-08-30 | End: 2018-11-30 | Stop reason: SDUPTHER

## 2018-08-30 RX ORDER — PRAVASTATIN SODIUM 80 MG/1
80 TABLET ORAL DAILY
Qty: 90 TABLET | Refills: 2 | Status: SHIPPED | OUTPATIENT
Start: 2018-08-30

## 2018-08-30 RX ORDER — ALLOPURINOL 300 MG/1
300 TABLET ORAL DAILY
Qty: 30 TABLET | Refills: 5 | Status: SHIPPED | OUTPATIENT
Start: 2018-08-30 | End: 2018-11-30 | Stop reason: SDUPTHER

## 2018-08-30 ASSESSMENT — PATIENT HEALTH QUESTIONNAIRE - PHQ9
SUM OF ALL RESPONSES TO PHQ QUESTIONS 1-9: 0
2. FEELING DOWN, DEPRESSED OR HOPELESS: 0
SUM OF ALL RESPONSES TO PHQ9 QUESTIONS 1 & 2: 0
SUM OF ALL RESPONSES TO PHQ QUESTIONS 1-9: 0
1. LITTLE INTEREST OR PLEASURE IN DOING THINGS: 0

## 2018-08-30 NOTE — PATIENT INSTRUCTIONS
think you are having a problem with your medicine. You will get more details on the specific medicines your doctor prescribes. · Check your blood sugar as often as your doctor recommends. It is important to keep track of any symptoms you have, such as low blood sugar. Also tell your doctor if you have any changes in your activities, diet, or insulin use. · Talk to your doctor before you start taking aspirin every day. Aspirin can help certain people lower their risk of a heart attack or stroke. But taking aspirin isn't right for everyone, because it can cause serious bleeding. · Do not smoke. If you need help quitting, talk to your doctor about stop-smoking programs and medicines. These can increase your chances of quitting for good. · Keep your cholesterol and blood pressure at normal levels. You may need to take one or more medicines to reach your goals. Take them exactly as directed. Do not stop or change a medicine without talking to your doctor first.  When should you call for help? Call 911 anytime you think you may need emergency care. For example, call if:    · You passed out (lost consciousness), or you suddenly become very sleepy or confused. (You may have very low blood sugar.)    Call your doctor now or seek immediate medical care if:    · Your blood sugar is 300 mg/dL or is higher than the level your doctor has set for you.     · You have symptoms of low blood sugar, such as:  ¨ Sweating. ¨ Feeling nervous, shaky, and weak. ¨ Extreme hunger and slight nausea. ¨ Dizziness and headache. ¨ Blurred vision. ¨ Confusion.    Watch closely for changes in your health, and be sure to contact your doctor if:    · You often have problems controlling your blood sugar.     · You have symptoms of long-term diabetes problems, such as:  ¨ New vision changes. ¨ New pain, numbness, or tingling in your hands or feet. ¨ Skin problems. Where can you learn more? Go to [email or web address] and

## 2018-08-30 NOTE — PROGRESS NOTES
Chief Complaint   Patient presents with    3 Month Follow-Up     dm, htn, hyperlipidemia and labs        La Choudhary is here for follow up of elevated cholesterol, elevated blood pressure, and diabetes. Compliance with treatment has been good. Patient denies muscle pain associated with his medications. He is exercising and is adherent to a low-salt diet. Blood pressure is well controlled at home. Cardiac symptoms: none. Patient denies: chest pain, claudication, dyspnea, exertional chest pressure/discomfort, fatigue, lower extremity edema, near-syncope, orthopnea, palpitations, paroxysmal nocturnal dyspnea and syncope. Cardiovascular risk factors: advanced age (older than 54 for men, 72 for women), diabetes mellitus, dyslipidemia, hypertension, male gender and obesity (BMI >= 30 kg/m2). Current diabetic symptoms include: none. Patient denies foot ulcerations, hyperglycemia, hypoglycemia , increase appetite, nausea, paresthesia of the feet, polydipsia, polyuria, visual disturbances, vomitting and weight loss. Evaluation to date has included: fasting blood sugar, fasting lipid panel, hemoglobin A1C and microalbuminuria. Past Medical History:   Diagnosis Date    Cervical spine arthritis ([facility] [address]     Hyperlipidemia     Hypertension     Microalbuminuria 4/22/2016    Type II or unspecified type diabetes mellitus without mention of complication, not stated as uncontrolled      Patient Active Problem List    Diagnosis Date Noted    Gastroesophageal reflux disease 08/30/2018    Type 2 diabetes mellitus without complication, without long-term current use of insulin ([facility] [address] 07/27/2016    Microalbuminuria 04/22/2016    Essential hypertension 01/22/2016    Chronic idiopathic gout 12/27/2012    Mixed hyperlipidemia      Past Surgical History:   Procedure Laterality Date    COLONOSCOPY  5/1/14    DR. ELMORE    HERNIA REPAIR      UPPER GASTROINTESTINAL ENDOSCOPY  5/1/14    DR. ELMORE     Family History   Problem congestion, sore mouth, tinnitus and voice change  Respiratory: negative for cough, dyspnea on exertion, shortness of breath and wheezing  Cardiovascular: negative for chest pain, dyspnea, lower extremity edema, orthopnea, palpitations, paroxysmal nocturnal dyspnea and tachypnea  Gastrointestinal: negative for abdominal pain, constipation, diarrhea, dyspepsia, dysphagia, nausea, odynophagia, reflux symptoms and vomiting  Genitourinary:negative for decreased stream, dysuria, frequency, hematuria, hesitancy and urinary incontinence  Integument/breast: negative for dryness, pruritus, rash and skin color change  Hematologic/lymphatic: negative for bleeding, easy bruising and petechiae  Musculoskeletal:negative for arthralgias, back pain, muscle weakness, myalgias, neck pain and stiff joints  Neurological: negative for coordination problems, dizziness, headaches, paresthesia, speech problems, tremors and vertigo       Objective:      /66   Pulse 76   Temp 97.2 °F (36.2 °C) (Temporal)   Resp 16   Ht 6' 1.25\" (1.861 m)   Wt 234 lb 6.4 oz (106.3 kg)   BMI 30.71 kg/m²       Well appearing, well nourished patient not in apparent distress. HEENT:   EOMI, PERRLA. No conjunctival pallor or scleral jaundice. Oropharynx reveals no erythema or exudate. TMs normal bilaterally. LYMPHATICS:   no lymphadenopathy. SKIN:  pink, warm and dry with no rash. NECK:  supple, trachea central, no thyromegaly. No JVD Or carotid bruit. CHEST WALL:  no deformity. No chest wall tenderness. LUNGS:  has normal chest wall excursion. Chest wall is symmetrical.   Normal vocal fremitus. Normal tactile fremitus. Has good air entry bilaterally with normal vesicular breath sounds. No rhonchi, rales or wheezes. HEART:   point of maximum impulse is at the 5th left intercostal space, mid clavicular line. No palpable thrill. First and second heart sounds are normal.   No murmur, gallop or rub. ABDOMEN:  non-distended, soft, moves with respiration. No area of tenderness. No guarding and no rebound tenderness. No CVA tenderness. No palpable intraabdominal mass. Bowel sounds normal.     EXTREMITIES:   no cc or e. NEURO: alert and oriented x3. Cranial nerves II-XII normal with no focal deficit. Has normal gait. MUSCULOSKELETAL:   no joint swelling or deformity noted.        Lab Review  Orders Only on 08/23/2018   Component Date Value Ref Range Status    WBC 08/23/2018 4.8  4.2 - 11.0 10*3/uL Final    RBC 08/23/2018 4.49  4.08 - 6.37 10*6/uL Final    Hemoglobin 08/23/2018 13.8  12.8 - 17.7 g/dL Final    Hematocrit 08/23/2018 39.7  38.4 - 54.9 % Final    MCV 08/23/2018 88.4  83.3 - 98.2 fL Final    MCH 08/23/2018 30.7  27.5 - 32.9 pg Final    MCHC 08/23/2018 34.8  30.5 - 35.4 g/dL Final    RDW-CV 08/23/2018 11.9* 12.0 - 15.4 % Final    RDW-SD 08/23/2018 38.7* 39.3 - 48.6 fL Final    Platelets 74/27/1533 194  155 - 404 10*3/uL Final    MPV 08/23/2018 10.8  9.9 - 12.1 fL Final    Nucleated RBCs 08/23/2018 0.0  /100[WBCs] Final    RBCs Counted 08/23/2018 0.00  10*3/uL Final    Neutrophils # 08/23/2018 2.82  2.22 - 7.53 10*3/uL Final    Lymphocytes # 08/23/2018 1.35  0.40 - 2.84 10*3/uL Final    Monocytes # 08/23/2018 0.47  0.25 - 1.33 10*3/uL Final    Eosinophils # 08/23/2018 0.11  0.01 - 0.46 10*3/uL Final    Basophils # 08/23/2018 0.04  0.01 - 0.09 10*3/uL Final    Immature Granulocytes 08/23/2018 0.8  % Final    Immature Grans (Abs) 08/23/2018 0.04  0.00 - 0.21 10*3/uL Final    Neutrophils 08/23/2018 58.4  46.2 - 79.1 % Final    Lymphocytes 08/23/2018 28.0  9.4 - 41.1 % Final    Monocytes 08/23/2018 9.7  3.0 - 16.2 % Final    Eosinophils 08/23/2018 2.3  0.0 - 6.7 % Final    Basophils 08/23/2018 0.8  0.0 - 1.3 % Final    Glucose 08/23/2018 180* 70 - 100 mg/dL Final    Urea Nitrogen 08/23/2018 17  6 - 23 mg/dL Final    CREATININE 08/23/2018 0.82  0.50 - 1.30 mg/dL Final    Gfr Calculated 08/23/2018 >60   Final    Calcium 08/23/2018 9.5  8.6 - 10.3 mg/dL Final    Sodium 08/23/2018 138  136 - 145 mmol/L Final    Potassium 08/23/2018 4.0  3.5 - 5.1 mmol/L Final    Chloride 08/23/2018 104  98 - 107 mmol/L Final    HCO3 08/23/2018 27  21 - 32 mmol/L Final    Albumin 08/23/2018 4.1  3.4 - 5.0 g/dL Final    Total Bilirubin 08/23/2018 0.5  0.0 - 1.2 mg/dL Final    Alkaline Phosphatase 08/23/2018 73  45 - 117 U/L Final    Total Protein 08/23/2018 6.7  6.4 - 8.2 g/dL Final    ALT 08/23/2018 21  10 - 52 U/L Final    AST 08/23/2018 16  13 - 39 U/L Final    Anion Gap 08/23/2018 11  10 - 20 mmol/L Final    Albumin/Globulin Ratio 08/23/2018 1.6  0.9 - 2.4 Final    BUN/Creatinine Ratio 08/23/2018 21  5 - 25 Final    Cholesterol 08/23/2018 201* <200 mg/dL Final    Triglycerides 08/23/2018 373* <150 mg/dL Final    HDL 08/23/2018 30* mg/dL Final    LDL Calculated 08/23/2018 96  <130 mg/dL Final    VLDL Cholesterol Calculated 08/23/2018 75* <30 mg/dL Final    Chol/HDL Ratio 08/23/2018 6.7   Final    Hemoglobin A1C 08/23/2018 7.7* 4.0 - 6.0 % Final        Assessment:     Encounter Diagnoses   Name Primary?     Type 2 diabetes mellitus without complication, without long-term current use of insulin ([facility]) Yes    Essential hypertension     Mixed hyperlipidemia     Microalbuminuria     Gastroesophageal reflux disease, esophagitis presence not specified     Chronic idiopathic gout        Plan:      Outpatient Encounter Prescriptions as of 8/30/2018   Medication Sig Dispense Refill    metFORMIN (GLUCOPHAGE) 500 MG tablet Take 2 tablets by mouth 2 times daily (with meals) 360 tablet 1    omeprazole (PRILOSEC) 40 MG delayed release capsule Take 1 capsule by mouth daily 30 capsule 5    fenofibrate 160 MG tablet Take 1 tablet by mouth daily 30 tablet 5    niacin (NIASPAN) 1000 MG extended release tablet Take 1 tablet by mouth nightly 30 tablet 5    lisinopril (PRINIVIL;ZESTRIL) 2.5 MG tablet TAKE 1 TABLET BY MOUTH DAILY. 90 tablet 1    allopurinol (ZYLOPRIM) 300 MG tablet Take 1 tablet by mouth daily 30 tablet 5    pravastatin (PRAVACHOL) 80 MG tablet Take 1 tablet by mouth daily 90 tablet 2    glipiZIDE (GLUCOTROL) 10 MG tablet Take 1 tablet by mouth 2 times daily (before meals) 60 tablet 8    etodolac (LODINE) 400 MG tablet Take 400 mg by mouth 2 times daily       sitaGLIPtin (JANUVIA) 100 MG tablet TAKE 1 TABLET BY MOUTH DAILY. 90 tablet 0    aspirin 81 MG chewable tablet Take 81 mg by mouth daily.  [DISCONTINUED] pravastatin (PRAVACHOL) 80 MG tablet Take 1 tablet by mouth daily 90 tablet 2    [DISCONTINUED] allopurinol (ZYLOPRIM) 300 MG tablet Take 1 tablet by mouth daily 30 tablet 5    [DISCONTINUED] omeprazole (PRILOSEC) 40 MG delayed release capsule Take 1 capsule by mouth daily 30 capsule 5    [DISCONTINUED] fenofibrate 160 MG tablet Take 1 tablet by mouth daily 30 tablet 5    [DISCONTINUED] niacin (NIASPAN) 1000 MG extended release tablet Take 1 tablet by mouth nightly 30 tablet 5    [DISCONTINUED] lisinopril (PRINIVIL;ZESTRIL) 2.5 MG tablet TAKE 1 TABLET BY MOUTH DAILY. 90 tablet 1    [DISCONTINUED] metFORMIN (GLUCOPHAGE) 500 MG tablet Take 2 tablets by mouth 2 times daily (with meals) 360 tablet 1     No facility-administered encounter medications on file as of 8/30/2018. Orders Placed This Encounter   Procedures    Comprehensive Metabolic Panel     Standing Status:   Future     Standing Expiration Date:   8/31/2019    Lipid Panel     Standing Status:   Future     Standing Expiration Date:   8/31/2019     Order Specific Question:   Is Patient Fasting?/# of Hours     Answer:   8-10    Hemoglobin A1C     Standing Status:   Future     Standing Expiration Date:   8/31/2019       1. Continue dietary measures. 2. Continue regular exercise. 3. Discussed general issues about diabetes pathophysiology and management.   Counseling at today's

## 2018-11-23 LAB
A/G RATIO: 1.7 (ref 0.9–2.4)
ALBUMIN: 4.2 G/DL (ref 3.4–5)
ALP BLD-CCNC: 80 U/L (ref 45–117)
ALT SERPL-CCNC: 24 U/L (ref 10–52)
ANION GAP SERPL CALCULATED.3IONS-SCNC: 15 MMOL/L (ref 10–20)
AST SERPL-CCNC: 19 U/L (ref 13–39)
BICARBONATE: 27 MMOL/L (ref 21–32)
BILIRUB SERPL-MCNC: 0.6 MG/DL (ref 0–1.2)
BUN / CREAT RATIO: 16 (ref 5–25)
CALCIUM SERPL-MCNC: 9.7 MG/DL (ref 8.6–10.3)
CHLORIDE BLD-SCNC: 100 MMOL/L (ref 98–107)
CHOLESTEROL/HDL RATIO: 7.2
CHOLESTEROL: 209 MG/DL
CREAT SERPL-MCNC: 0.93 MG/DL (ref 0.5–1.3)
GFR CALCULATED: >60
GLUCOSE: 247 MG/DL (ref 70–100)
HBA1C MFR BLD: 8.4 % (ref 4–6)
HDLC SERPL-MCNC: 29 MG/DL
LDL CHOLESTEROL: 128 MG/DL
POTASSIUM SERPL-SCNC: 3.7 MMOL/L (ref 3.5–5.1)
SODIUM BLD-SCNC: 138 MMOL/L (ref 136–145)
TOTAL PROTEIN: 6.7 G/DL (ref 6.4–8.2)
TRIGL SERPL-MCNC: 489 MG/DL
UREA NITROGEN: 15 MG/DL (ref 6–23)
VLDLC SERPL CALC-MCNC: ABNORMAL MG/DL

## 2018-11-30 ENCOUNTER — OFFICE VISIT (OUTPATIENT)
Dept: FAMILY MEDICINE CLINIC | Age: 73
End: 2018-11-30
Payer: COMMERCIAL

## 2018-11-30 VITALS
WEIGHT: 225 LBS | HEIGHT: 73 IN | SYSTOLIC BLOOD PRESSURE: 138 MMHG | HEART RATE: 72 BPM | BODY MASS INDEX: 29.82 KG/M2 | TEMPERATURE: 96.6 F | RESPIRATION RATE: 14 BRPM | DIASTOLIC BLOOD PRESSURE: 72 MMHG

## 2018-11-30 DIAGNOSIS — E78.2 MIXED HYPERLIPIDEMIA: ICD-10-CM

## 2018-11-30 DIAGNOSIS — E11.9 TYPE 2 DIABETES MELLITUS WITHOUT COMPLICATION, WITHOUT LONG-TERM CURRENT USE OF INSULIN (HCC): Primary | ICD-10-CM

## 2018-11-30 DIAGNOSIS — M1A.00X0 CHRONIC IDIOPATHIC GOUT: ICD-10-CM

## 2018-11-30 DIAGNOSIS — K21.9 GASTROESOPHAGEAL REFLUX DISEASE, ESOPHAGITIS PRESENCE NOT SPECIFIED: ICD-10-CM

## 2018-11-30 DIAGNOSIS — I10 ESSENTIAL HYPERTENSION: ICD-10-CM

## 2018-11-30 PROCEDURE — 99214 OFFICE O/P EST MOD 30 MIN: CPT | Performed by: FAMILY MEDICINE

## 2018-11-30 RX ORDER — FENOFIBRATE 160 MG/1
160 TABLET ORAL DAILY
Qty: 30 TABLET | Refills: 5 | Status: SHIPPED | OUTPATIENT
Start: 2018-11-30

## 2018-11-30 RX ORDER — ALLOPURINOL 300 MG/1
300 TABLET ORAL DAILY
Qty: 30 TABLET | Refills: 5 | Status: SHIPPED | OUTPATIENT
Start: 2018-11-30

## 2018-11-30 RX ORDER — OMEPRAZOLE 20 MG/1
20 CAPSULE, DELAYED RELEASE ORAL
Qty: 30 CAPSULE | Refills: 5 | Status: SHIPPED | OUTPATIENT
Start: 2018-11-30 | End: 2019-02-03 | Stop reason: SDUPTHER

## 2018-11-30 RX ORDER — OMEPRAZOLE 40 MG/1
40 CAPSULE, DELAYED RELEASE ORAL DAILY
Qty: 30 CAPSULE | Refills: 5 | Status: CANCELLED | OUTPATIENT
Start: 2018-11-30

## 2018-11-30 RX ORDER — NIACIN 1000 MG/1
1000 TABLET, EXTENDED RELEASE ORAL NIGHTLY
Qty: 30 TABLET | Refills: 5 | Status: CANCELLED | OUTPATIENT
Start: 2018-11-30

## 2018-11-30 RX ORDER — GLIPIZIDE 10 MG/1
10 TABLET ORAL
Qty: 60 TABLET | Refills: 8 | Status: SHIPPED | OUTPATIENT
Start: 2018-11-30

## 2018-11-30 RX ORDER — LISINOPRIL 2.5 MG/1
TABLET ORAL
Qty: 90 TABLET | Refills: 1 | Status: SHIPPED | OUTPATIENT
Start: 2018-11-30

## 2018-11-30 RX ORDER — NIACIN 1000 MG
1 TABLET, EXTENDED RELEASE ORAL DAILY
COMMUNITY
Start: 2018-11-30

## 2018-11-30 ASSESSMENT — PATIENT HEALTH QUESTIONNAIRE - PHQ9
1. LITTLE INTEREST OR PLEASURE IN DOING THINGS: 0
SUM OF ALL RESPONSES TO PHQ QUESTIONS 1-9: 0
2. FEELING DOWN, DEPRESSED OR HOPELESS: 0
SUM OF ALL RESPONSES TO PHQ QUESTIONS 1-9: 0
SUM OF ALL RESPONSES TO PHQ9 QUESTIONS 1 & 2: 0

## 2018-11-30 NOTE — PROGRESS NOTES
change  Respiratory: negative for cough, dyspnea on exertion, shortness of breath and wheezing  Cardiovascular: negative for chest pain, dyspnea, lower extremity edema, orthopnea, palpitations, paroxysmal nocturnal dyspnea and tachypnea  Gastrointestinal: negative for abdominal pain, constipation, diarrhea, dyspepsia, dysphagia, nausea, odynophagia, reflux symptoms and vomiting  Genitourinary:negative for decreased stream, dysuria, frequency, hematuria, hesitancy and urinary incontinence  Integument/breast: negative for dryness, pruritus, rash and skin color change  Hematologic/lymphatic: negative for bleeding, easy bruising and petechiae  Musculoskeletal:negative for arthralgias, back pain, muscle weakness, myalgias, neck pain and stiff joints  Neurological: negative for coordination problems, dizziness, headaches, paresthesia, speech problems, tremors and vertigo       Objective:      /72   Pulse 72   Temp 96.6 °F (35.9 °C) (Temporal)   Resp 14   Ht 6' 1.25\" (1.861 m)   Wt 225 lb (102.1 kg)   BMI 29.48 kg/m²       Well appearing, well nourished patient not in apparent distress. HEENT:   EOMI, PERRLA. No conjunctival pallor or scleral jaundice. Oropharynx reveals no erythema or exudate. TMs normal bilaterally. LYMPHATICS:   no lymphadenopathy. SKIN:  pink, warm and dry with no rash. NECK:  supple, trachea central, no thyromegaly. No JVD Or carotid bruit. CHEST WALL:  no deformity. No chest wall tenderness. LUNGS:  has normal chest wall excursion. Chest wall is symmetrical.   Normal vocal fremitus. Normal tactile fremitus. Has good air entry bilaterally with normal vesicular breath sounds. No rhonchi, rales or wheezes. HEART:   point of maximum impulse is at the 5th left intercostal space, mid clavicular line. No palpable thrill. First and second heart sounds are normal.   No murmur, gallop or rub. ABDOMEN:  non-distended, soft, moves with respiration. No area of tenderness. No guarding and no rebound tenderness. No CVA tenderness. No palpable intraabdominal mass. Bowel sounds normal.     EXTREMITIES:   no cc or e. NEURO: alert and oriented x3. Cranial nerves II-XII normal with no focal deficit. Has normal gait. MUSCULOSKELETAL:   no joint swelling or deformity noted. Lab Review  Orders Only on 11/23/2018   Component Date Value Ref Range Status    Glucose 11/23/2018 247* 70 - 100 mg/dL Final    Urea Nitrogen 11/23/2018 15  6 - 23 mg/dL Final    CREATININE 11/23/2018 0.93  0.50 - 1.30 mg/dL Final    Gfr Calculated 11/23/2018 >60   Final    Calcium 11/23/2018 9.7  8.6 - 10.3 mg/dL Final    Sodium 11/23/2018 138  136 - 145 mmol/L Final    Potassium 11/23/2018 3.7  3.5 - 5.1 mmol/L Final    Chloride 11/23/2018 100  98 - 107 mmol/L Final    HCO3 11/23/2018 27  21 - 32 mmol/L Final    Albumin 11/23/2018 4.2  3.4 - 5.0 g/dL Final    Total Bilirubin 11/23/2018 0.6  0.0 - 1.2 mg/dL Final    Alkaline Phosphatase 11/23/2018 80  45 - 117 U/L Final    Total Protein 11/23/2018 6.7  6.4 - 8.2 g/dL Final    ALT 11/23/2018 24  10 - 52 U/L Final    AST 11/23/2018 19  13 - 39 U/L Final    Anion Gap 11/23/2018 15  10 - 20 mmol/L Final    Albumin/Globulin Ratio 11/23/2018 1.7  0.9 - 2.4 Final    BUN/Creatinine Ratio 11/23/2018 16  5 - 25 Final    Cholesterol 11/23/2018 209* <200 mg/dL Final    Triglycerides 11/23/2018 489* <150 mg/dL Final    HDL 11/23/2018 29* mg/dL Final    VLDL Cholesterol Calculated 11/23/2018 See Below  <30 mg/dL Final    Chol/HDL Ratio 11/23/2018 7.2   Final    LDL Cholesterol 11/23/2018 128  <129 mg/dL Final    Hemoglobin A1C 11/23/2018 8.4* 4.0 - 6.0 % Final        Assessment:     Encounter Diagnoses   Name Primary?     Type 2 diabetes mellitus without complication, without long-term current use of insulin (HCC) Yes    Essential hypertension     Mixed hyperlipidemia     Chronic idiopathic gout     Placed This Encounter   Procedures    Comprehensive Metabolic Panel     Standing Status:   Future     Standing Expiration Date:   11/30/2019    Lipid Panel     Standing Status:   Future     Standing Expiration Date:   11/30/2019     Order Specific Question:   Is Patient Fasting?/# of Hours     Answer:   10-12    Hemoglobin A1C     Standing Status:   Future     Standing Expiration Date:   11/30/2019       1. Continue dietary measures. 2. Continue regular exercise. 3. Discussed general issues about diabetes pathophysiology and management. Counseling at today's visit: focused on the need for regular aerobic exercise, focused on the need to adhere to the prescribed ADA diet, discussed the advantages of a diet low in carbohydrates, reminded to check sugars regularly and to bring readings in at the time of the next visit, discussed DASH diet and discussed management of hypoglycemic episodes. Addressed ADA diet. Suggested low cholesterol diet. Encouraged aerobic exercise. Discussed foot care. Reminded to get yearly retinal exam.  Discussed ways to avoid symptomatic hypoglycemia. Reminded to bring in blood sugar diary at next visit. Diabetes Counseling   Patient was counseled regarding disease risks and adopting healthy behaviors. Patient was provided education materials to assist with self management. Patient was provided log (or received log during previous visit) to record blood pressure, food intake and/or blood sugar. Patient was instructed to keep log up-to-date and to always bring log to all office visits. Return in about 3 months (around 2/28/2019).

## 2019-02-03 DIAGNOSIS — K21.9 GASTROESOPHAGEAL REFLUX DISEASE, ESOPHAGITIS PRESENCE NOT SPECIFIED: ICD-10-CM

## 2019-02-03 RX ORDER — OMEPRAZOLE 20 MG/1
20 CAPSULE, DELAYED RELEASE ORAL
Qty: 30 CAPSULE | Refills: 0 | Status: SHIPPED | OUTPATIENT
Start: 2019-02-03 | End: 2019-03-24 | Stop reason: SDUPTHER

## 2019-02-23 LAB
A/G RATIO: 1.6 (ref 0.9–2.4)
ALBUMIN: 4.3 G/DL (ref 3.4–5)
ALP BLD-CCNC: 76 U/L (ref 45–117)
ALT SERPL-CCNC: 19 U/L (ref 10–52)
ANION GAP SERPL CALCULATED.3IONS-SCNC: 14 MMOL/L (ref 10–20)
AST SERPL-CCNC: 17 U/L (ref 13–39)
BICARBONATE: 27 MMOL/L (ref 21–32)
BILIRUB SERPL-MCNC: 0.6 MG/DL (ref 0–1.2)
BUN / CREAT RATIO: 18 (ref 5–25)
CALCIUM SERPL-MCNC: 9.5 MG/DL (ref 8.6–10.3)
CHLORIDE BLD-SCNC: 100 MMOL/L (ref 98–107)
CHOLESTEROL/HDL RATIO: 6.8
CHOLESTEROL: 197 MG/DL
CREAT SERPL-MCNC: 0.96 MG/DL (ref 0.5–1.3)
GFR CALCULATED: >60
GLUCOSE: 247 MG/DL (ref 70–100)
HBA1C MFR BLD: 8.2 % (ref 4–6)
HDLC SERPL-MCNC: 29 MG/DL
LDL CHOLESTEROL CALCULATED: 89 MG/DL
POTASSIUM SERPL-SCNC: 3.8 MMOL/L (ref 3.5–5.1)
SODIUM BLD-SCNC: 137 MMOL/L (ref 136–145)
TOTAL PROTEIN: 7 G/DL (ref 6.4–8.2)
TRIGL SERPL-MCNC: 393 MG/DL
UREA NITROGEN: 17 MG/DL (ref 6–23)
VLDLC SERPL CALC-MCNC: 79 MG/DL

## 2019-03-01 ENCOUNTER — OFFICE VISIT (OUTPATIENT)
Dept: FAMILY MEDICINE CLINIC | Age: 74
End: 2019-03-01
Payer: COMMERCIAL

## 2019-03-01 VITALS
SYSTOLIC BLOOD PRESSURE: 136 MMHG | HEART RATE: 72 BPM | WEIGHT: 226.4 LBS | RESPIRATION RATE: 16 BRPM | TEMPERATURE: 97 F | HEIGHT: 73 IN | DIASTOLIC BLOOD PRESSURE: 80 MMHG | BODY MASS INDEX: 30 KG/M2

## 2019-03-01 DIAGNOSIS — K21.9 GASTROESOPHAGEAL REFLUX DISEASE, ESOPHAGITIS PRESENCE NOT SPECIFIED: ICD-10-CM

## 2019-03-01 DIAGNOSIS — E11.9 TYPE 2 DIABETES MELLITUS WITHOUT COMPLICATION, WITHOUT LONG-TERM CURRENT USE OF INSULIN (HCC): Primary | ICD-10-CM

## 2019-03-01 DIAGNOSIS — M1A.00X0 CHRONIC IDIOPATHIC GOUT: ICD-10-CM

## 2019-03-01 DIAGNOSIS — I10 ESSENTIAL HYPERTENSION: ICD-10-CM

## 2019-03-01 DIAGNOSIS — E78.2 MIXED HYPERLIPIDEMIA: ICD-10-CM

## 2019-03-01 PROCEDURE — 99214 OFFICE O/P EST MOD 30 MIN: CPT | Performed by: FAMILY MEDICINE

## 2019-03-01 RX ORDER — CLOBETASOL PROPIONATE 0.5 MG/G
OINTMENT TOPICAL
Qty: 60 G | Refills: 1 | Status: SHIPPED | OUTPATIENT
Start: 2019-03-01

## 2019-03-01 ASSESSMENT — PATIENT HEALTH QUESTIONNAIRE - PHQ9
2. FEELING DOWN, DEPRESSED OR HOPELESS: 0
1. LITTLE INTEREST OR PLEASURE IN DOING THINGS: 0
SUM OF ALL RESPONSES TO PHQ QUESTIONS 1-9: 0
SUM OF ALL RESPONSES TO PHQ QUESTIONS 1-9: 0
SUM OF ALL RESPONSES TO PHQ9 QUESTIONS 1 & 2: 0

## 2019-03-07 ENCOUNTER — TELEPHONE (OUTPATIENT)
Dept: FAMILY MEDICINE CLINIC | Age: 74
End: 2019-03-07

## 2022-11-17 NOTE — PROGRESS NOTES
Vaccine Information Sheet, \"Influenza - Inactivated\" OR \"Live - Intranasal\"  given to [facility]. Patient responses:    Have you ever had a reaction to a flu vaccine? No  Are you able to eat eggs without adverse effects? Yes  Do you have any current illness? No  Have you ever had Guillian Ward Syndrome? No    Flu vaccine given per order. Please see immunization tab. tamsulosin (FLOMAX) 0.4 MG capsule    Alpha Blockers Passed     6/20/2022  [facility] Internal [facility] [place]   William Marquez MD  Internal Medicine

## 2023-02-07 PROBLEM — M1A.00X0 CHRONIC IDIOPATHIC GOUT: Status: ACTIVE | Noted: 2023-02-07

## 2023-02-07 PROBLEM — K59.00 CONSTIPATION: Status: ACTIVE | Noted: 2023-02-07

## 2023-02-07 PROBLEM — K62.5 RECTAL BLEEDING: Status: ACTIVE | Noted: 2023-02-07

## 2023-02-07 PROBLEM — Z86.010 PERSONAL HISTORY OF COLONIC POLYPS: Status: ACTIVE | Noted: 2023-02-07

## 2023-02-07 PROBLEM — R35.0 URINARY FREQUENCY: Status: ACTIVE | Noted: 2023-02-07

## 2023-02-07 PROBLEM — E78.2 MIXED HYPERLIPIDEMIA: Status: ACTIVE | Noted: 2023-02-07

## 2023-02-07 PROBLEM — Z86.0100 PERSONAL HISTORY OF COLONIC POLYPS: Status: ACTIVE | Noted: 2023-02-07

## 2023-02-07 PROBLEM — R39.9 UTI SYMPTOMS: Status: ACTIVE | Noted: 2023-02-07

## 2023-02-07 PROBLEM — E11.9 TYPE 2 DIABETES MELLITUS WITHOUT COMPLICATION, WITHOUT LONG-TERM CURRENT USE OF INSULIN (MULTI): Status: ACTIVE | Noted: 2023-02-07

## 2023-02-07 PROBLEM — L98.9 SCALP LESION: Status: ACTIVE | Noted: 2023-02-07

## 2023-02-07 PROBLEM — R06.02 SHORTNESS OF BREATH: Status: ACTIVE | Noted: 2023-02-07

## 2023-02-07 PROBLEM — N13.8 BENIGN PROSTATIC HYPERPLASIA WITH URINARY OBSTRUCTION: Status: ACTIVE | Noted: 2023-02-07

## 2023-02-07 PROBLEM — M54.12 CERVICAL RADICULOPATHY: Status: ACTIVE | Noted: 2023-02-07

## 2023-02-07 PROBLEM — R19.7 DIARRHEA: Status: ACTIVE | Noted: 2023-02-07

## 2023-02-07 PROBLEM — K57.90 DIVERTICULOSIS: Status: ACTIVE | Noted: 2023-02-07

## 2023-02-07 PROBLEM — E66.3 OVERWEIGHT WITH BODY MASS INDEX (BMI) OF 27 TO 27.9 IN ADULT: Status: ACTIVE | Noted: 2023-02-07

## 2023-02-07 PROBLEM — N39.0 ACUTE UTI: Status: ACTIVE | Noted: 2023-02-07

## 2023-02-07 PROBLEM — I45.10 RIGHT BUNDLE BRANCH BLOCK (RBBB) ON ELECTROCARDIOGRAPHY: Status: ACTIVE | Noted: 2023-02-07

## 2023-02-07 PROBLEM — I10 ESSENTIAL HYPERTENSION: Status: ACTIVE | Noted: 2023-02-07

## 2023-02-07 PROBLEM — R81 GLYCOSURIA: Status: ACTIVE | Noted: 2023-02-07

## 2023-02-07 PROBLEM — U07.1 COVID-19: Status: ACTIVE | Noted: 2023-02-07

## 2023-02-07 PROBLEM — K21.9 GASTROESOPHAGEAL REFLUX DISEASE WITHOUT ESOPHAGITIS: Status: ACTIVE | Noted: 2023-02-07

## 2023-02-07 PROBLEM — N40.1 BENIGN PROSTATIC HYPERPLASIA WITH URINARY OBSTRUCTION: Status: ACTIVE | Noted: 2023-02-07

## 2023-02-07 PROBLEM — R10.9 RIGHT FLANK PAIN: Status: ACTIVE | Noted: 2023-02-07

## 2023-02-07 PROBLEM — R39.15 URINARY URGENCY: Status: ACTIVE | Noted: 2023-02-07

## 2023-02-07 RX ORDER — BLOOD-GLUCOSE CONTROL, NORMAL
EACH MISCELLANEOUS 3 TIMES DAILY
COMMUNITY

## 2023-02-07 RX ORDER — ALLOPURINOL 300 MG/1
1 TABLET ORAL DAILY
COMMUNITY
Start: 2019-02-09 | End: 2023-03-21 | Stop reason: SDUPTHER

## 2023-02-07 RX ORDER — IBUPROFEN 200 MG
CAPSULE ORAL 3 TIMES DAILY
COMMUNITY
Start: 2022-08-25

## 2023-02-07 RX ORDER — GLIPIZIDE 10 MG/1
1 TABLET ORAL 2 TIMES DAILY
COMMUNITY
Start: 2019-03-04 | End: 2023-03-21 | Stop reason: SDUPTHER

## 2023-02-07 RX ORDER — METFORMIN HYDROCHLORIDE 500 MG/1
2 TABLET ORAL 2 TIMES DAILY
COMMUNITY
Start: 2019-03-04 | End: 2023-03-21 | Stop reason: SDUPTHER

## 2023-02-07 RX ORDER — TAMSULOSIN HYDROCHLORIDE 0.4 MG/1
1 CAPSULE ORAL DAILY
COMMUNITY
Start: 2022-08-25 | End: 2023-04-26 | Stop reason: SDUPTHER

## 2023-02-07 RX ORDER — ASPIRIN 81 MG/1
1 TABLET ORAL DAILY
COMMUNITY
End: 2023-12-14 | Stop reason: SDUPTHER

## 2023-02-07 RX ORDER — NIACIN 1000 MG
1 TABLET, EXTENDED RELEASE ORAL 2 TIMES DAILY
COMMUNITY
Start: 2019-06-11 | End: 2024-05-14 | Stop reason: WASHOUT

## 2023-02-07 RX ORDER — FENOFIBRATE 160 MG/1
1 TABLET ORAL DAILY
COMMUNITY
Start: 2019-03-04 | End: 2023-12-24

## 2023-02-07 RX ORDER — OMEPRAZOLE 20 MG/1
1 CAPSULE, DELAYED RELEASE ORAL DAILY
COMMUNITY
Start: 2019-02-03 | End: 2023-03-21 | Stop reason: SDUPTHER

## 2023-02-07 RX ORDER — FERROUS SULFATE 324(65)MG
1 TABLET, DELAYED RELEASE (ENTERIC COATED) ORAL
COMMUNITY

## 2023-02-07 RX ORDER — LISINOPRIL 2.5 MG/1
1 TABLET ORAL DAILY
COMMUNITY
Start: 2019-02-02 | End: 2023-03-21 | Stop reason: SDUPTHER

## 2023-02-07 RX ORDER — PRAVASTATIN SODIUM 80 MG/1
1 TABLET ORAL DAILY
COMMUNITY
Start: 2019-02-02 | End: 2023-09-03

## 2023-03-15 LAB
ALANINE AMINOTRANSFERASE (SGPT) (U/L) IN SER/PLAS: 15 U/L (ref 10–52)
ALBUMIN (G/DL) IN SER/PLAS: 4.4 G/DL (ref 3.4–5)
ALKALINE PHOSPHATASE (U/L) IN SER/PLAS: 59 U/L (ref 33–136)
ANION GAP IN SER/PLAS: 14 MMOL/L (ref 10–20)
ASPARTATE AMINOTRANSFERASE (SGOT) (U/L) IN SER/PLAS: 14 U/L (ref 9–39)
BASOPHILS (10*3/UL) IN BLOOD BY AUTOMATED COUNT: 0.05 X10E9/L (ref 0–0.1)
BASOPHILS/100 LEUKOCYTES IN BLOOD BY AUTOMATED COUNT: 0.7 % (ref 0–2)
BILIRUBIN TOTAL (MG/DL) IN SER/PLAS: 0.5 MG/DL (ref 0–1.2)
CALCIUM (MG/DL) IN SER/PLAS: 9.8 MG/DL (ref 8.6–10.3)
CARBON DIOXIDE, TOTAL (MMOL/L) IN SER/PLAS: 26 MMOL/L (ref 21–32)
CHLORIDE (MMOL/L) IN SER/PLAS: 102 MMOL/L (ref 98–107)
CHOLESTEROL (MG/DL) IN SER/PLAS: 203 MG/DL (ref 0–199)
CHOLESTEROL IN HDL (MG/DL) IN SER/PLAS: 46.9 MG/DL
CHOLESTEROL/HDL RATIO: 4.3
CREATININE (MG/DL) IN SER/PLAS: 1 MG/DL (ref 0.5–1.3)
EOSINOPHILS (10*3/UL) IN BLOOD BY AUTOMATED COUNT: 0.18 X10E9/L (ref 0–0.4)
EOSINOPHILS/100 LEUKOCYTES IN BLOOD BY AUTOMATED COUNT: 2.7 % (ref 0–6)
ERYTHROCYTE DISTRIBUTION WIDTH (RATIO) BY AUTOMATED COUNT: 16.5 % (ref 11.5–14.5)
ERYTHROCYTE MEAN CORPUSCULAR HEMOGLOBIN CONCENTRATION (G/DL) BY AUTOMATED: 31.3 G/DL (ref 32–36)
ERYTHROCYTE MEAN CORPUSCULAR VOLUME (FL) BY AUTOMATED COUNT: 87 FL (ref 80–100)
ERYTHROCYTES (10*6/UL) IN BLOOD BY AUTOMATED COUNT: 5.22 X10E12/L (ref 4.5–5.9)
ESTIMATED AVERAGE GLUCOSE FOR HBA1C: 143 MG/DL
GFR MALE: 78 ML/MIN/1.73M2
GLUCOSE (MG/DL) IN SER/PLAS: 161 MG/DL (ref 74–99)
HEMATOCRIT (%) IN BLOOD BY AUTOMATED COUNT: 45.3 % (ref 41–52)
HEMOGLOBIN (G/DL) IN BLOOD: 14.2 G/DL (ref 13.5–17.5)
HEMOGLOBIN A1C/HEMOGLOBIN TOTAL IN BLOOD: 6.6 %
IMMATURE GRANULOCYTES/100 LEUKOCYTES IN BLOOD BY AUTOMATED COUNT: 0.4 % (ref 0–0.9)
LDL: 102 MG/DL (ref 0–99)
LEUKOCYTES (10*3/UL) IN BLOOD BY AUTOMATED COUNT: 6.7 X10E9/L (ref 4.4–11.3)
LYMPHOCYTES (10*3/UL) IN BLOOD BY AUTOMATED COUNT: 1.52 X10E9/L (ref 0.8–3)
LYMPHOCYTES/100 LEUKOCYTES IN BLOOD BY AUTOMATED COUNT: 22.7 % (ref 13–44)
MONOCYTES (10*3/UL) IN BLOOD BY AUTOMATED COUNT: 0.52 X10E9/L (ref 0.05–0.8)
MONOCYTES/100 LEUKOCYTES IN BLOOD BY AUTOMATED COUNT: 7.7 % (ref 2–10)
NEUTROPHILS (10*3/UL) IN BLOOD BY AUTOMATED COUNT: 4.41 X10E9/L (ref 1.6–5.5)
NEUTROPHILS/100 LEUKOCYTES IN BLOOD BY AUTOMATED COUNT: 65.8 % (ref 40–80)
NON HDL CHOLESTEROL: 156 MG/DL
PLATELETS (10*3/UL) IN BLOOD AUTOMATED COUNT: 250 X10E9/L (ref 150–450)
POTASSIUM (MMOL/L) IN SER/PLAS: 4 MMOL/L (ref 3.5–5.3)
PROTEIN TOTAL: 7.1 G/DL (ref 6.4–8.2)
SODIUM (MMOL/L) IN SER/PLAS: 138 MMOL/L (ref 136–145)
TRIGLYCERIDE (MG/DL) IN SER/PLAS: 273 MG/DL (ref 0–149)
UREA NITROGEN (MG/DL) IN SER/PLAS: 20 MG/DL (ref 6–23)
VLDL: 55 MG/DL (ref 0–40)

## 2023-03-20 PROBLEM — N39.0 ACUTE UTI: Status: RESOLVED | Noted: 2023-02-07 | Resolved: 2023-03-20

## 2023-03-20 PROBLEM — I45.10 UNSPECIFIED RIGHT BUNDLE-BRANCH BLOCK: Status: ACTIVE | Noted: 2022-11-23

## 2023-03-20 PROBLEM — E87.5 HYPERKALEMIA: Status: ACTIVE | Noted: 2022-11-06

## 2023-03-20 PROBLEM — R10.9 UNSPECIFIED ABDOMINAL PAIN: Status: RESOLVED | Noted: 2022-08-19 | Resolved: 2023-03-20

## 2023-03-20 PROBLEM — R07.9 CHEST PAIN: Status: RESOLVED | Noted: 2023-01-16 | Resolved: 2023-03-20

## 2023-03-20 PROBLEM — N28.1 CYST OF KIDNEY, ACQUIRED: Status: ACTIVE | Noted: 2022-08-19

## 2023-03-20 PROBLEM — I71.40 ABDOMINAL AORTIC ANEURYSM, WITHOUT RUPTURE, UNSPECIFIED (CMS-HCC): Status: ACTIVE | Noted: 2022-11-06

## 2023-03-20 PROBLEM — K57.31 DVRTCLOS OF LG INT W/O PERFORATION OR ABSCESS W BLEEDING: Status: RESOLVED | Noted: 2022-11-06 | Resolved: 2023-03-20

## 2023-03-20 PROBLEM — U07.1 COVID-19: Status: RESOLVED | Noted: 2023-02-07 | Resolved: 2023-03-20

## 2023-03-20 PROBLEM — I95.9 HYPOTENSION, UNSPECIFIED: Status: ACTIVE | Noted: 2022-11-06

## 2023-03-20 PROBLEM — I25.10 CAD (CORONARY ARTERY DISEASE): Status: ACTIVE | Noted: 2023-03-20

## 2023-03-20 PROBLEM — K92.2 GI BLEED: Status: RESOLVED | Noted: 2023-03-20 | Resolved: 2023-03-20

## 2023-03-20 PROBLEM — D62 ACUTE POSTHEMORRHAGIC ANEMIA: Status: ACTIVE | Noted: 2022-11-06

## 2023-03-20 PROBLEM — M43.12 SPONDYLOLISTHESIS, CERVICAL REGION: Status: ACTIVE | Noted: 2022-06-20

## 2023-03-20 RX ORDER — ASPIRIN 81 MG/1
1 TABLET ORAL DAILY
COMMUNITY

## 2023-03-21 ENCOUNTER — OFFICE VISIT (OUTPATIENT)
Dept: PRIMARY CARE | Facility: CLINIC | Age: 78
End: 2023-03-21
Payer: MEDICARE

## 2023-03-21 VITALS
DIASTOLIC BLOOD PRESSURE: 60 MMHG | TEMPERATURE: 97 F | BODY MASS INDEX: 27.83 KG/M2 | HEIGHT: 73 IN | HEART RATE: 60 BPM | SYSTOLIC BLOOD PRESSURE: 108 MMHG | RESPIRATION RATE: 16 BRPM | WEIGHT: 210 LBS

## 2023-03-21 DIAGNOSIS — I10 ESSENTIAL HYPERTENSION: ICD-10-CM

## 2023-03-21 DIAGNOSIS — I71.40 ABDOMINAL AORTIC ANEURYSM (AAA) WITHOUT RUPTURE, UNSPECIFIED PART (CMS-HCC): ICD-10-CM

## 2023-03-21 DIAGNOSIS — E78.2 MIXED HYPERLIPIDEMIA: ICD-10-CM

## 2023-03-21 DIAGNOSIS — K21.9 GASTROESOPHAGEAL REFLUX DISEASE WITHOUT ESOPHAGITIS: ICD-10-CM

## 2023-03-21 DIAGNOSIS — E11.9 TYPE 2 DIABETES MELLITUS WITHOUT COMPLICATION, WITHOUT LONG-TERM CURRENT USE OF INSULIN (MULTI): ICD-10-CM

## 2023-03-21 DIAGNOSIS — M1A.00X0 CHRONIC IDIOPATHIC GOUT: ICD-10-CM

## 2023-03-21 DIAGNOSIS — M43.12 SPONDYLOLISTHESIS, CERVICAL REGION: ICD-10-CM

## 2023-03-21 DIAGNOSIS — Z00.00 ROUTINE GENERAL MEDICAL EXAMINATION AT HEALTH CARE FACILITY: Primary | ICD-10-CM

## 2023-03-21 PROCEDURE — 4010F ACE/ARB THERAPY RXD/TAKEN: CPT | Performed by: FAMILY MEDICINE

## 2023-03-21 PROCEDURE — 1036F TOBACCO NON-USER: CPT | Performed by: FAMILY MEDICINE

## 2023-03-21 PROCEDURE — 1160F RVW MEDS BY RX/DR IN RCRD: CPT | Performed by: FAMILY MEDICINE

## 2023-03-21 PROCEDURE — 1159F MED LIST DOCD IN RCRD: CPT | Performed by: FAMILY MEDICINE

## 2023-03-21 PROCEDURE — G0439 PPPS, SUBSEQ VISIT: HCPCS | Performed by: FAMILY MEDICINE

## 2023-03-21 PROCEDURE — 3078F DIAST BP <80 MM HG: CPT | Performed by: FAMILY MEDICINE

## 2023-03-21 PROCEDURE — 3074F SYST BP LT 130 MM HG: CPT | Performed by: FAMILY MEDICINE

## 2023-03-21 PROCEDURE — 1170F FXNL STATUS ASSESSED: CPT | Performed by: FAMILY MEDICINE

## 2023-03-21 PROCEDURE — 3044F HG A1C LEVEL LT 7.0%: CPT | Performed by: FAMILY MEDICINE

## 2023-03-21 PROCEDURE — 99214 OFFICE O/P EST MOD 30 MIN: CPT | Performed by: FAMILY MEDICINE

## 2023-03-21 RX ORDER — ALLOPURINOL 300 MG/1
300 TABLET ORAL DAILY
Qty: 90 TABLET | Refills: 1 | Status: SHIPPED | OUTPATIENT
Start: 2023-03-21 | End: 2023-12-24

## 2023-03-21 RX ORDER — LISINOPRIL 2.5 MG/1
2.5 TABLET ORAL DAILY
Qty: 90 TABLET | Refills: 1 | Status: SHIPPED | OUTPATIENT
Start: 2023-03-21 | End: 2023-09-20 | Stop reason: SDUPTHER

## 2023-03-21 RX ORDER — CELECOXIB 200 MG/1
200 CAPSULE ORAL DAILY
Qty: 30 CAPSULE | Refills: 1 | Status: SHIPPED | OUTPATIENT
Start: 2023-03-21 | End: 2023-04-20

## 2023-03-21 RX ORDER — OMEPRAZOLE 20 MG/1
20 CAPSULE, DELAYED RELEASE ORAL DAILY
Qty: 90 CAPSULE | Refills: 1 | Status: SHIPPED | OUTPATIENT
Start: 2023-03-21 | End: 2023-09-20 | Stop reason: SDUPTHER

## 2023-03-21 RX ORDER — GLIPIZIDE 10 MG/1
10 TABLET ORAL
Qty: 180 TABLET | Refills: 1 | Status: SHIPPED | OUTPATIENT
Start: 2023-03-21 | End: 2023-09-20 | Stop reason: SDUPTHER

## 2023-03-21 RX ORDER — METFORMIN HYDROCHLORIDE 500 MG/1
1000 TABLET ORAL
Qty: 360 TABLET | Refills: 1 | Status: SHIPPED | OUTPATIENT
Start: 2023-03-21 | End: 2023-09-20 | Stop reason: SDUPTHER

## 2023-03-21 ASSESSMENT — PATIENT HEALTH QUESTIONNAIRE - PHQ9
2. FEELING DOWN, DEPRESSED OR HOPELESS: NOT AT ALL
1. LITTLE INTEREST OR PLEASURE IN DOING THINGS: NOT AT ALL
SUM OF ALL RESPONSES TO PHQ9 QUESTIONS 1 AND 2: 0

## 2023-03-21 ASSESSMENT — ACTIVITIES OF DAILY LIVING (ADL)
GROCERY_SHOPPING: INDEPENDENT
DOING_HOUSEWORK: INDEPENDENT
MANAGING_FINANCES: INDEPENDENT
TAKING_MEDICATION: INDEPENDENT
DRESSING: INDEPENDENT
BATHING: INDEPENDENT

## 2023-03-21 NOTE — ASSESSMENT & PLAN NOTE
"Dietary sodium restriction.  Regular aerobic exercise program is recommended to help achieve and maintain normal body weight, fitness and improve lipid balance. .  Dietary changes: Increase soluble fiber  Plant sterols 2grams per day (e.g. Benecol)  Reduce saturated fat, \"trans\" monounsaturated fatty acids, and cholesterol    "

## 2023-03-21 NOTE — ASSESSMENT & PLAN NOTE
Diabetes Mellitus type II, under good control.  1. Rx changes: None  2. Education: Reviewed ‘ABCs’ of diabetes management (respective goals in parentheses):  A1C (<7), blood pressure (<130/80), and cholesterol (LDL <100).  3. Compliance at present is estimated to be good. Efforts to improve compliance (if necessary) will be directed at dietary modifications: and increased exercise.  4. Follow up: 3 months

## 2023-03-21 NOTE — PROGRESS NOTES
Chief Complaint   Patient presents with    Medicare Annual Wellness Visit Subsequent    Follow-up     Diabetes, Hypertension, Hyperlipidemia and labs     Scottie Blandon is a 77 y.o. male here for Annual Medicare Wellness Visit.     Diabetes Mellitus  Patient presents for follow up of diabetes. Current symptoms include: none. Symptoms have stabilized. Patient denies foot ulcerations, nausea, polydipsia, polyuria, visual disturbances, vomiting, and weight loss. Evaluation to date has included: fasting blood sugar, fasting lipid panel, hemoglobin A1C, and microalbuminuria.  Home sugars: BGs are running  consistent with Hgb A1C. Current treatment: more intensive attention to diet which has been somewhat effective, low cholesterol diet which has been somewhat effective, and increased aerobic exercise which has been somewhat effective. Last dilated eye exam:   Patient denies chest pain, claudication, dyspnea, exertional chest pressure/discomfort, near-syncope, orthopnea, palpitations, paroxysmal nocturnal dyspnea, and syncope     Past Medical, Surgical, and Family History reviewed and updated in chart.    Reviewed all medications by prescribing practitioner or clinical pharmacist (such as prescriptions, OTCs, herbal therapies and supplements) and documented in the medical record.    Patient Self Assessment of Health Status  Patient Self Assessment: Fair    Nutrition and Exercise  Current Diet: Well Balanced Diet  Adequate Fluid Intake: Yes  Caffeine: Yes  Exercise Frequency: Infrequently    Functional Ability/Level of Safety  Cognitive Impairment Observed: No cognitive impairment observed  Cognitive Impairment Reported: No cognitive impairment reported by patient or family    Home Safety Risk Factors: None    Review of Systems - General ROS: negative for - fatigue, fever, malaise, night sweats, sleep disturbance or weight loss  Psychological ROS: negative for - anxiety, concentration difficulties, depression, memory  "difficulties or sleep disturbances  ENT ROS: negative for - hearing change, nasal discharge, oral lesions, sinus pain, sore throat, tinnitus or vertigo  Allergy and Immunology ROS: negative for - hives, nasal congestion or seasonal allergies  Hematological and Lymphatic ROS: negative for - bruising, fatigue, night sweats or pallor  Endocrine ROS: negative for - hot flashes, malaise/lethargy, palpitations, polydipsia/polyuria, skin changes, temperature intolerance or unexpected weight changes  Respiratory ROS: negative for - cough, hemoptysis, pleuritic pain, shortness of breath or wheezing  Cardiovascular ROS: no chest pain or dyspnea on exertion  Gastrointestinal ROS: no abdominal pain, change in bowel habits, or black or bloody stools  Genito-Urinary ROS: no dysuria, trouble voiding, or hematuria  Musculoskeletal ROS: negative for - joint pain, joint stiffness, joint swelling, muscle pain or muscular weakness  Neurological ROS: negative for - dizziness, gait disturbance, headaches, impaired coordination/balance, numbness/tingling, tremors or visual changes  Dermatological ROS: negative for - dry skin, lumps, pruritus or rash    Objective   Vitals:  /60   Pulse 60   Temp 36.1 °C (97 °F)   Resp 16   Ht 1.854 m (6' 1\")   Wt 95.3 kg (210 lb)   BMI 27.71 kg/m²       Physical Examination: General appearance - alert, well appearing, and in no distress  Mental status - alert, oriented to person, place, and time  Eyes - pupils equal and reactive, extraocular eye movements intact  Ears - bilateral TM's and external ear canals normal  Mouth - mucous membranes moist, pharynx normal without lesions  Neck - supple, no significant adenopathy  Lymphatics - no palpable lymphadenopathy, no hepatosplenomegaly  Chest - clear to auscultation, no wheezes, rales or rhonchi, symmetric air entry  Heart -regular rate and rhythm with 2/6 systolic normal in the left sternal border with no gallop or rub.   Abdomen - soft, " "nontender, nondistended, no masses or organomegaly  Neurological - alert, oriented, normal speech, no focal findings or movement disorder noted  Musculoskeletal - no joint tenderness, deformity or swelling  Extremities: peripheral pulses normal, no pedal edema, no clubbing or cyanosis.    Assessment/Plan     Problem List Items Addressed This Visit       Abdominal aortic aneurysm, without rupture, unspecified    Current Assessment & Plan     Chronic Condition Documentation : Stable based on symptoms and exam.  Continue established treatment plan and follow-up at least yearly.           Relevant Orders    Follow Up In [facility] - PCP    Chronic idiopathic gout    Relevant Medications    allopurinol (Zyloprim) 300 mg tablet    Essential hypertension    Current Assessment & Plan     Dietary sodium restriction.  Regular aerobic exercise program is recommended to help achieve and maintain normal body weight, fitness and improve lipid balance. .  Dietary changes: Increase soluble fiber  Plant sterols 2grams per day (e.g. Benecol)  Reduce saturated fat, \"trans\" monounsaturated fatty acids, and cholesterol           Relevant Medications    lisinopril 2.5 mg tablet    Other Relevant Orders    Follow Up In [facility] - PCP    CBC and Auto Differential    Comprehensive Metabolic Panel    Hemoglobin A1C    Lipid Panel    Gastroesophageal reflux disease without esophagitis    Relevant Medications    omeprazole (PriLOSEC) 20 mg DR capsule    Mixed hyperlipidemia    Current Assessment & Plan     The nature of cardiac risk has been fully discussed with this patient. I have made  aware of  LDL target goal given  cardiovascular risk analysis. I have discussed the appropriate diet. The need for lifelong compliance in order to reduce risk is stressed. A regular exercise program is recommended to help achieve and maintain normal body weight, fitness and improve lipid balance. A written copy of a low fat, low " cholesterol diet has been given to the patient.  Patient education provided. They understand and agree with this course of treatment. They will return with new or worsening symptoms. Patient instructed to remain current with appropriate annual health maintenance.            Relevant Orders    Follow Up In [facility] - PCP    CBC and Auto Differential    Comprehensive Metabolic Panel    Hemoglobin A1C    Lipid Panel    Routine general medical examination at health care facility - Primary    Spondylolisthesis, cervical region    Relevant Medications    celecoxib (CeleBREX) 200 mg capsule    Type 2 diabetes mellitus without complication, without long-term current use of insulin (CMS/[facility])    Current Assessment & Plan     Diabetes Mellitus type II, under good control.  1. Rx changes: None  2. Education: Reviewed ‘ABCs’ of diabetes management (respective goals in parentheses):  A1C (<7), blood pressure (<130/80), and cholesterol (LDL <100).  3. Compliance at present is estimated to be good. Efforts to improve compliance (if necessary) will be directed at dietary modifications: and increased exercise.  4. Follow up: 3 months           Relevant Medications    glipiZIDE (Glucotrol) 10 mg tablet    metFORMIN (Glucophage) 500 mg tablet    Other Relevant Orders    Follow Up In [facility] - PCP    CBC and Auto Differential    Comprehensive Metabolic Panel    Hemoglobin A1C    Lipid Panel     Patient to keep food diary and log of blood sugars and bring to next office visit. Patient encouraged to increase activity level gradually and encouraged weight loss. Strongly encouraged to maintain blood sugar at levels as close to normal as possible thus preventing or delaying complications (regular medical care is important for this). Encouraged to follow a balanced meal plan. Eat consistent and moderate amounts of food at regular times. Nuts and peanut butter are a good choice for a snack. Advised patient to not  skip meals. Recommended that patient: Eat plenty of vegetables and fiber, limited amounts of fat, moderate amounts of protein and low-fat dairy products, and carefully limit foods containing high concentrated sugar. Keep a record of your food intake. We will review at the next visit. Educated patient about exercise. Exercise lowers blood glucose levels. Regular exercise (eg, 30-60 minutes of walking every day) can help keep blood glucose in better control. Exercise increases insulin sensitivity and helps an individual lose weight. It can also help overall health.    Scribe Attestation  By signing my name below, IJean Marie Scribe   attest that this documentation has been prepared under the direction and in the presence of Roger Wakefield MD.

## 2023-04-26 DIAGNOSIS — N40.1 BENIGN PROSTATIC HYPERPLASIA WITH URINARY OBSTRUCTION: Primary | ICD-10-CM

## 2023-04-26 DIAGNOSIS — N13.8 BENIGN PROSTATIC HYPERPLASIA WITH URINARY OBSTRUCTION: Primary | ICD-10-CM

## 2023-04-26 RX ORDER — TAMSULOSIN HYDROCHLORIDE 0.4 MG/1
0.4 CAPSULE ORAL DAILY
Qty: 90 CAPSULE | Refills: 1 | Status: SHIPPED | OUTPATIENT
Start: 2023-04-26 | End: 2023-09-20 | Stop reason: SDUPTHER

## 2023-04-26 NOTE — TELEPHONE ENCOUNTER
Received a fax from [facility] [address] requesting refill for the patient's prescription of Tamsulosin. Medication has been pended to the provider for approval.     Last Visit: 3/21/23  Next Visit: 6/20/23    Last refill: 9/22/22

## 2023-06-14 ENCOUNTER — LAB (OUTPATIENT)
Dept: LAB | Facility: LAB | Age: 78
End: 2023-06-14
Payer: MEDICARE

## 2023-06-14 DIAGNOSIS — I10 ESSENTIAL HYPERTENSION: ICD-10-CM

## 2023-06-14 DIAGNOSIS — E78.2 MIXED HYPERLIPIDEMIA: ICD-10-CM

## 2023-06-14 DIAGNOSIS — E11.9 TYPE 2 DIABETES MELLITUS WITHOUT COMPLICATION, WITHOUT LONG-TERM CURRENT USE OF INSULIN (MULTI): ICD-10-CM

## 2023-06-14 LAB
ALANINE AMINOTRANSFERASE (SGPT) (U/L) IN SER/PLAS: 10 U/L (ref 10–52)
ALBUMIN (G/DL) IN SER/PLAS: 4.3 G/DL (ref 3.4–5)
ALKALINE PHOSPHATASE (U/L) IN SER/PLAS: 74 U/L (ref 33–136)
ANION GAP IN SER/PLAS: 16 MMOL/L (ref 10–20)
ASPARTATE AMINOTRANSFERASE (SGOT) (U/L) IN SER/PLAS: 9 U/L (ref 9–39)
BASOPHILS (10*3/UL) IN BLOOD BY AUTOMATED COUNT: 0.04 X10E9/L (ref 0–0.1)
BASOPHILS/100 LEUKOCYTES IN BLOOD BY AUTOMATED COUNT: 0.6 % (ref 0–2)
BILIRUBIN TOTAL (MG/DL) IN SER/PLAS: 0.9 MG/DL (ref 0–1.2)
CALCIUM (MG/DL) IN SER/PLAS: 9.2 MG/DL (ref 8.6–10.3)
CARBON DIOXIDE, TOTAL (MMOL/L) IN SER/PLAS: 24 MMOL/L (ref 21–32)
CHLORIDE (MMOL/L) IN SER/PLAS: 103 MMOL/L (ref 98–107)
CHOLESTEROL (MG/DL) IN SER/PLAS: 191 MG/DL (ref 0–199)
CHOLESTEROL IN HDL (MG/DL) IN SER/PLAS: 41.9 MG/DL
CHOLESTEROL/HDL RATIO: 4.6
CREATININE (MG/DL) IN SER/PLAS: 0.86 MG/DL (ref 0.5–1.3)
EOSINOPHILS (10*3/UL) IN BLOOD BY AUTOMATED COUNT: 0.18 X10E9/L (ref 0–0.4)
EOSINOPHILS/100 LEUKOCYTES IN BLOOD BY AUTOMATED COUNT: 2.6 % (ref 0–6)
ERYTHROCYTE DISTRIBUTION WIDTH (RATIO) BY AUTOMATED COUNT: 13.8 % (ref 11.5–14.5)
ERYTHROCYTE MEAN CORPUSCULAR HEMOGLOBIN CONCENTRATION (G/DL) BY AUTOMATED: 33.8 G/DL (ref 32–36)
ERYTHROCYTE MEAN CORPUSCULAR VOLUME (FL) BY AUTOMATED COUNT: 92 FL (ref 80–100)
ERYTHROCYTES (10*6/UL) IN BLOOD BY AUTOMATED COUNT: 5.14 X10E12/L (ref 4.5–5.9)
ESTIMATED AVERAGE GLUCOSE FOR HBA1C: 157 MG/DL
GFR MALE: 90 ML/MIN/1.73M2
GLUCOSE (MG/DL) IN SER/PLAS: 162 MG/DL (ref 74–99)
HEMATOCRIT (%) IN BLOOD BY AUTOMATED COUNT: 47.3 % (ref 41–52)
HEMOGLOBIN (G/DL) IN BLOOD: 16 G/DL (ref 13.5–17.5)
HEMOGLOBIN A1C/HEMOGLOBIN TOTAL IN BLOOD: 7.1 %
IMMATURE GRANULOCYTES/100 LEUKOCYTES IN BLOOD BY AUTOMATED COUNT: 0.7 % (ref 0–0.9)
LDL: 102 MG/DL (ref 0–99)
LEUKOCYTES (10*3/UL) IN BLOOD BY AUTOMATED COUNT: 6.9 X10E9/L (ref 4.4–11.3)
LYMPHOCYTES (10*3/UL) IN BLOOD BY AUTOMATED COUNT: 1.78 X10E9/L (ref 0.8–3)
LYMPHOCYTES/100 LEUKOCYTES IN BLOOD BY AUTOMATED COUNT: 25.8 % (ref 13–44)
MONOCYTES (10*3/UL) IN BLOOD BY AUTOMATED COUNT: 0.56 X10E9/L (ref 0.05–0.8)
MONOCYTES/100 LEUKOCYTES IN BLOOD BY AUTOMATED COUNT: 8.1 % (ref 2–10)
NEUTROPHILS (10*3/UL) IN BLOOD BY AUTOMATED COUNT: 4.3 X10E9/L (ref 1.6–5.5)
NEUTROPHILS/100 LEUKOCYTES IN BLOOD BY AUTOMATED COUNT: 62.2 % (ref 40–80)
NON HDL CHOLESTEROL: 149 MG/DL
PLATELETS (10*3/UL) IN BLOOD AUTOMATED COUNT: 173 X10E9/L (ref 150–450)
POTASSIUM (MMOL/L) IN SER/PLAS: 4 MMOL/L (ref 3.5–5.3)
PROTEIN TOTAL: 6.5 G/DL (ref 6.4–8.2)
SODIUM (MMOL/L) IN SER/PLAS: 139 MMOL/L (ref 136–145)
TRIGLYCERIDE (MG/DL) IN SER/PLAS: 237 MG/DL (ref 0–149)
UREA NITROGEN (MG/DL) IN SER/PLAS: 17 MG/DL (ref 6–23)
VLDL: 47 MG/DL (ref 0–40)

## 2023-06-14 PROCEDURE — 80061 LIPID PANEL: CPT

## 2023-06-14 PROCEDURE — 83036 HEMOGLOBIN GLYCOSYLATED A1C: CPT

## 2023-06-14 PROCEDURE — 80053 COMPREHEN METABOLIC PANEL: CPT

## 2023-06-14 PROCEDURE — 85025 COMPLETE CBC W/AUTO DIFF WBC: CPT

## 2023-06-14 PROCEDURE — 36415 COLL VENOUS BLD VENIPUNCTURE: CPT

## 2023-06-20 ENCOUNTER — OFFICE VISIT (OUTPATIENT)
Dept: PRIMARY CARE | Facility: CLINIC | Age: 78
End: 2023-06-20
Payer: MEDICARE

## 2023-06-20 VITALS
HEIGHT: 73 IN | BODY MASS INDEX: 27.41 KG/M2 | DIASTOLIC BLOOD PRESSURE: 60 MMHG | HEART RATE: 61 BPM | SYSTOLIC BLOOD PRESSURE: 108 MMHG | WEIGHT: 206.8 LBS | RESPIRATION RATE: 16 BRPM | TEMPERATURE: 97 F | OXYGEN SATURATION: 98 %

## 2023-06-20 DIAGNOSIS — Z12.5 SCREENING FOR MALIGNANT NEOPLASM OF PROSTATE: ICD-10-CM

## 2023-06-20 DIAGNOSIS — I71.40 ABDOMINAL AORTIC ANEURYSM (AAA) WITHOUT RUPTURE, UNSPECIFIED PART (CMS-HCC): ICD-10-CM

## 2023-06-20 DIAGNOSIS — D64.9 ANEMIA, UNSPECIFIED TYPE: ICD-10-CM

## 2023-06-20 DIAGNOSIS — N40.1 BENIGN PROSTATIC HYPERPLASIA WITH URINARY OBSTRUCTION: ICD-10-CM

## 2023-06-20 DIAGNOSIS — E11.3293 TYPE 2 DIABETES MELLITUS WITH BOTH EYES AFFECTED BY MILD NONPROLIFERATIVE RETINOPATHY WITHOUT MACULAR EDEMA, WITHOUT LONG-TERM CURRENT USE OF INSULIN (MULTI): ICD-10-CM

## 2023-06-20 DIAGNOSIS — E11.9 TYPE 2 DIABETES MELLITUS WITHOUT COMPLICATION, WITHOUT LONG-TERM CURRENT USE OF INSULIN (MULTI): Primary | ICD-10-CM

## 2023-06-20 DIAGNOSIS — E78.2 MIXED HYPERLIPIDEMIA: ICD-10-CM

## 2023-06-20 DIAGNOSIS — I10 ESSENTIAL HYPERTENSION: ICD-10-CM

## 2023-06-20 DIAGNOSIS — N13.8 BENIGN PROSTATIC HYPERPLASIA WITH URINARY OBSTRUCTION: ICD-10-CM

## 2023-06-20 PROCEDURE — 4010F ACE/ARB THERAPY RXD/TAKEN: CPT | Performed by: FAMILY MEDICINE

## 2023-06-20 PROCEDURE — 3074F SYST BP LT 130 MM HG: CPT | Performed by: FAMILY MEDICINE

## 2023-06-20 PROCEDURE — 1159F MED LIST DOCD IN RCRD: CPT | Performed by: FAMILY MEDICINE

## 2023-06-20 PROCEDURE — 99214 OFFICE O/P EST MOD 30 MIN: CPT | Performed by: FAMILY MEDICINE

## 2023-06-20 PROCEDURE — 3078F DIAST BP <80 MM HG: CPT | Performed by: FAMILY MEDICINE

## 2023-06-20 PROCEDURE — 3051F HG A1C>EQUAL 7.0%<8.0%: CPT | Performed by: FAMILY MEDICINE

## 2023-06-20 PROCEDURE — 1036F TOBACCO NON-USER: CPT | Performed by: FAMILY MEDICINE

## 2023-06-20 PROCEDURE — 1160F RVW MEDS BY RX/DR IN RCRD: CPT | Performed by: FAMILY MEDICINE

## 2023-06-20 RX ORDER — CELECOXIB 200 MG/1
200 CAPSULE ORAL DAILY
COMMUNITY

## 2023-06-20 ASSESSMENT — PATIENT HEALTH QUESTIONNAIRE - PHQ9
1. LITTLE INTEREST OR PLEASURE IN DOING THINGS: NOT AT ALL
2. FEELING DOWN, DEPRESSED OR HOPELESS: NOT AT ALL
SUM OF ALL RESPONSES TO PHQ9 QUESTIONS 1 AND 2: 0

## 2023-06-20 NOTE — PROGRESS NOTES
Chief Complaint   Patient presents with    Follow-up     Diabetes, Hypertension, Hyperlipidemia, other chronic medical conditions and labs      He presents today for follow up on Diabetes Mellitus, hypertension, hyperlipidemia    Current  diabetes related symptoms include: none. Patient denies foot ulcerations, hypoglycemia , nausea, paresthesia of the feet, polydipsia, polyuria, visual disturbances, vomiting, and weight loss.  Patient denies chest pain, claudication, dyspnea, exertional chest pressure/discomfort, irregular heart beat, lower extremity edema, orthopnea, palpitations, paroxysmal nocturnal dyspnea, and syncope. Evaluation to date has included: fasting blood sugar, fasting lipid panel, hemoglobin A1C, and microalbuminuria.  Home sugars: BGs are running  consistent with Hgb A1C.   Patient denies chest pain, claudication, dyspnea, exertional chest pressure/discomfort, irregular heart beat, lower extremity edema, orthopnea, palpitations, paroxysmal nocturnal dyspnea, and syncope.     Past Medical History:   Diagnosis Date    Chest pain 01/16/2023    GI bleed 03/20/2023     Patient Active Problem List    Diagnosis Date Noted    Routine general medical examination at [facility] care facility 03/21/2023    CAD (coronary artery disease) 03/20/2023    Benign prostatic hyperplasia with urinary obstruction 02/07/2023    Cervical radiculopathy 02/07/2023    Chronic idiopathic gout 02/07/2023    Constipation 02/07/2023    Diverticulosis 02/07/2023    Essential hypertension 02/07/2023    Gastroesophageal reflux disease without esophagitis 02/07/2023    Mixed hyperlipidemia 02/07/2023    Personal history of colonic polyps 02/07/2023    Rectal bleeding 02/07/2023    Right bundle branch block (RBBB) on electrocardiography 02/07/2023    Right flank pain 02/07/2023    Scalp lesion 02/07/2023    Shortness of breath 02/07/2023    Type 2 diabetes mellitus without complication, without long-term current use of insulin (CMS/[facility])  02/07/2023    Urinary frequency 02/07/2023    Urinary urgency 02/07/2023    UTI symptoms 02/07/2023    Diarrhea 02/07/2023    Glycosuria 02/07/2023    Anemia 01/20/2023    Unspecified right bundle-branch block 11/23/2022    Abdominal aortic aneurysm, without rupture, unspecified (CMS/[facility]) 11/06/2022    Acute posthemorrhagic anemia 11/06/2022    Hyperkalemia 11/06/2022    Hypotension, unspecified 11/06/2022    Cyst of kidney, acquired 08/19/2022    Spondylolisthesis, cervical region 06/20/2022    Microalbuminuria 04/22/2016     Past Surgical History:   Procedure Laterality Date    OTHER SURGICAL HISTORY  06/05/2019    Hernia repair    OTHER SURGICAL HISTORY  08/12/2022    Colonoscopy    OTHER SURGICAL HISTORY  06/11/2019    Cardiac catheterization    OTHER SURGICAL HISTORY  06/11/2019    Tonsillectomy    OTHER SURGICAL HISTORY  06/11/2019    Cataract surgery     No family history on file.    Social History     Socioeconomic History    Marital status:      Spouse name: None    Number of children: None    Years of education: None    Highest education level: None   Occupational History    None   Tobacco Use    Smoking status: Never    Smokeless tobacco: Never   Vaping Use    Vaping Use: Never used   Substance and Sexual Activity    Alcohol use: Not Currently    Drug use: Never    Sexual activity: Defer   Other Topics Concern    None   Social History Narrative    None     Social Determinants of Health     Financial Resource Strain: Not on file   Food Insecurity: Not on file   Transportation Needs: Not on file   Physical Activity: Not on file   Stress: Not on file   Social Connections: Not on file   Intimate Partner Violence: Not on file   Housing Stability: Not on file     Current Outpatient Medications   Medication Sig Dispense Refill    allopurinol (Zyloprim) 300 mg tablet Take 1 tablet (300 mg) by mouth once daily. 90 tablet 1    aspirin 81 mg EC tablet Take 1 tablet (81 mg) by mouth once daily.      blood  sugar diagnostic (Blood Glucose Test) strip 3 times a day.      celecoxib (CeleBREX) 200 mg capsule Take 1 capsule (200 mg) by mouth 2 times a day.      ertugliflozin (Steglatro) tablet Take 1 tablet (15 mg) by mouth once daily. 70 tablet 0    fenofibrate (Triglide) 160 mg tablet Take 1 tablet (160 mg) by mouth once daily.      ferrous sulfate 324 mg (65 mg iron) EC tablet Take 1 tablet (65 mg) by mouth once daily with a meal.      glipiZIDE (Glucotrol) 10 mg tablet Take 1 tablet (10 mg) by mouth in the morning and 1 tablet (10 mg) in the evening. Take before meals. 180 tablet 1    lancets 30 gauge misc in the morning, at noon, and at bedtime.      lisinopril 2.5 mg tablet Take 1 tablet (2.5 mg) by mouth once daily. 90 tablet 1    metFORMIN (Glucophage) 500 mg tablet Take 2 tablets (1,000 mg) by mouth in the morning and 2 tablets (1,000 mg) in the evening. Take with meals. 360 tablet 1    niacin 1,000 mg ER tablet Take 1 tablet (1,000 mg) by mouth once daily.      omeprazole (PriLOSEC) 20 mg DR capsule Take 1 capsule (20 mg) by mouth once daily. 90 capsule 1    pravastatin (Pravachol) 80 mg tablet Take 1 tablet (80 mg) by mouth once daily.      SITagliptin phosphate (Januvia) 100 mg tablet Take 1 tablet (100 mg) by mouth once daily. 70 tablet 0    tamsulosin (Flomax) 0.4 mg 24 hr capsule Take 1 capsule (0.4 mg) by mouth once daily. 90 capsule 1    aspirin 81 mg EC tablet Take 1 tablet (81 mg) by mouth once daily.       No current facility-administered medications for this visit.     Current Outpatient Medications on File Prior to Visit   Medication Sig Dispense Refill    allopurinol (Zyloprim) 300 mg tablet Take 1 tablet (300 mg) by mouth once daily. 90 tablet 1    aspirin 81 mg EC tablet Take 1 tablet (81 mg) by mouth once daily.      blood sugar diagnostic (Blood Glucose Test) strip 3 times a day.      celecoxib (CeleBREX) 200 mg capsule Take 1 capsule (200 mg) by mouth 2 times a day.      ertugliflozin  (Steglatro) tablet Take 1 tablet (15 mg) by mouth once daily. 70 tablet 0    fenofibrate (Triglide) 160 mg tablet Take 1 tablet (160 mg) by mouth once daily.      ferrous sulfate 324 mg (65 mg iron) EC tablet Take 1 tablet (65 mg) by mouth once daily with a meal.      glipiZIDE (Glucotrol) 10 mg tablet Take 1 tablet (10 mg) by mouth in the morning and 1 tablet (10 mg) in the evening. Take before meals. 180 tablet 1    lancets 30 gauge misc in the morning, at noon, and at bedtime.      lisinopril 2.5 mg tablet Take 1 tablet (2.5 mg) by mouth once daily. 90 tablet 1    metFORMIN (Glucophage) 500 mg tablet Take 2 tablets (1,000 mg) by mouth in the morning and 2 tablets (1,000 mg) in the evening. Take with meals. 360 tablet 1    niacin 1,000 mg ER tablet Take 1 tablet (1,000 mg) by mouth once daily.      omeprazole (PriLOSEC) 20 mg DR capsule Take 1 capsule (20 mg) by mouth once daily. 90 capsule 1    pravastatin (Pravachol) 80 mg tablet Take 1 tablet (80 mg) by mouth once daily.      SITagliptin phosphate (Januvia) 100 mg tablet Take 1 tablet (100 mg) by mouth once daily. 70 tablet 0    tamsulosin (Flomax) 0.4 mg 24 hr capsule Take 1 capsule (0.4 mg) by mouth once daily. 90 capsule 1    aspirin 81 mg EC tablet Take 1 tablet (81 mg) by mouth once daily.       No current facility-administered medications on file prior to visit.     Allergies   Allergen Reactions    Atorvastatin Unknown    Rosuvastatin Unknown          Review of Systems - General ROS: negative for - fatigue, fever, malaise, night sweats, sleep disturbance or weight loss  Psychological ROS: negative for - anxiety, concentration difficulties, depression, memory difficulties or sleep disturbances  ENT ROS: negative for - hearing change, nasal discharge, oral lesions, sinus pain, sore throat, tinnitus or vertigo  Allergy and Immunology ROS: negative for - hives, nasal congestion or seasonal allergies  Hematological and Lymphatic ROS: negative for - bruising,  "fatigue, night sweats or pallor  Endocrine ROS: negative for - hot flashes, malaise/lethargy, palpitations, polydipsia/polyuria, skin changes, temperature intolerance or unexpected weight changes  Respiratory ROS: negative for - cough, hemoptysis, pleuritic pain, shortness of breath or wheezing  Cardiovascular ROS: no chest pain or dyspnea on exertion  Gastrointestinal ROS: no abdominal pain, change in bowel habits, or black or bloody stools  Genito-Urinary ROS: no dysuria, trouble voiding, or hematuria  Musculoskeletal ROS: negative for - joint pain, joint stiffness, joint swelling, muscle pain or muscular weakness  Neurological ROS: negative for - dizziness, gait disturbance, headaches, impaired coordination/balance, numbness/tingling, tremors or visual changes  Dermatological ROS: negative for - dry skin, lumps, pruritus or rash      Blood pressure 108/60, pulse 61, temperature 36.1 °C (97 °F), resp. rate 16, height 1.854 m (6' 1\"), weight 93.8 kg (206 lb 12.8 oz), SpO2 98 %.    Physical Examination: General appearance - alert, well appearing, and in no distress  Mental status - alert, oriented to person, place, and time  Eyes - pupils equal and reactive, extraocular eye movements intact  Ears - bilateral TM's and external ear canals normal  Mouth - mucous membranes moist, pharynx normal without lesions  Neck - supple, no significant adenopathy  Lymphatics - no palpable lymphadenopathy, no hepatosplenomegaly  Chest - clear to auscultation, no wheezes, rales or rhonchi, symmetric air entry  Heart - normal rate, regular rhythm with occasional extrasystole, normal S1, S2, no murmurs, rubs, clicks or gallops  Abdomen - soft, nontender, nondistended, no masses or organomegaly  Neurological - alert, oriented, normal speech, no focal findings or movement disorder noted  Musculoskeletal - no joint tenderness, deformity or swelling  Extremities: peripheral pulses normal, no pedal edema, no clubbing or cyanosis.      Lab " on 06/14/2023   Component Date Value Ref Range Status    WBC 06/14/2023 6.9  4.4 - 11.3 x10E9/L Final    RBC 06/14/2023 5.14  4.50 - 5.90 x10E12/L Final    Hemoglobin 06/14/2023 16.0  13.5 - 17.5 g/dL Final    Hematocrit 06/14/2023 47.3  41.0 - 52.0 % Final    MCV 06/14/2023 92  80 - 100 fL Final    MCHC 06/14/2023 33.8  32.0 - 36.0 g/dL Final    Platelets 06/14/2023 173  150 - 450 x10E9/L Final    RDW 06/14/2023 13.8  11.5 - 14.5 % Final    Neutrophils % 06/14/2023 62.2  40.0 - 80.0 % Final    Immature Granulocytes %, Automated 06/14/2023 0.7  0.0 - 0.9 % Final     Immature Granulocyte Count (IG) includes promyelocytes,    myelocytes and metamyelocytes but does not include bands.   Percent differential counts (%) should be interpreted in the   context of the absolute cell counts (cells/L).    Lymphocytes % 06/14/2023 25.8  13.0 - 44.0 % Final    Monocytes % 06/14/2023 8.1  2.0 - 10.0 % Final    Eosinophils % 06/14/2023 2.6  0.0 - 6.0 % Final    Basophils % 06/14/2023 0.6  0.0 - 2.0 % Final    Neutrophils Absolute 06/14/2023 4.30  1.60 - 5.50 x10E9/L Final    Lymphocytes Absolute 06/14/2023 1.78  0.80 - 3.00 x10E9/L Final    Monocytes Absolute 06/14/2023 0.56  0.05 - 0.80 x10E9/L Final    Eosinophils Absolute 06/14/2023 0.18  0.00 - 0.40 x10E9/L Final    Basophils Absolute 06/14/2023 0.04  0.00 - 0.10 x10E9/L Final    Glucose 06/14/2023 162 (H)  74 - 99 mg/dL Final    Sodium 06/14/2023 139  136 - 145 mmol/L Final    Potassium 06/14/2023 4.0  3.5 - 5.3 mmol/L Final    Chloride 06/14/2023 103  98 - 107 mmol/L Final    Bicarbonate 06/14/2023 24  21 - 32 mmol/L Final    Anion Gap 06/14/2023 16  10 - 20 mmol/L Final    Urea Nitrogen 06/14/2023 17  6 - 23 mg/dL Final    Creatinine 06/14/2023 0.86  0.50 - 1.30 mg/dL Final    GFR MALE 06/14/2023 90  >90 mL/min/1.73m2 Final     CALCULATIONS OF ESTIMATED GFR ARE PERFORMED   USING THE 2021 CKD-EPI STUDY REFIT EQUATION   WITHOUT THE RACE VARIABLE FOR THE IDMS-TRACEABLE    CREATININE METHODS.    https://jasn.asnjournals.org/content/early/2021/09/22/ASN.3083475614    Calcium 06/14/2023 9.2  8.6 - 10.3 mg/dL Final    Albumin 06/14/2023 4.3  3.4 - 5.0 g/dL Final    Alkaline Phosphatase 06/14/2023 74  33 - 136 U/L Final    Total Protein 06/14/2023 6.5  6.4 - 8.2 g/dL Final    AST 06/14/2023 9  9 - 39 U/L Final    Total Bilirubin 06/14/2023 0.9  0.0 - 1.2 mg/dL Final    ALT (SGPT) 06/14/2023 10  10 - 52 U/L Final     Patients treated with Sulfasalazine may generate    falsely decreased results for ALT.    Hemoglobin A1C 06/14/2023 7.1 (A)  % Final         Diagnosis of Diabetes-Adults   Non-Diabetic: < or = 5.6%   Increased risk for developing diabetes: 5.7-6.4%   Diagnostic of diabetes: > or = 6.5%  .       Monitoring of Diabetes                Age (y)     Therapeutic Goal (%)   Adults:          >18           <7.0   Pediatrics:    13-18           <7.5                   7-12           <8.0                   0- 6            7.5-8.5   American Diabetes Association. Diabetes Care 33(S1), Jan 2010.    Estimated Average Glucose 06/14/2023 157  MG/DL Final    Cholesterol 06/14/2023 191  0 - 199 mg/dL Final    .      AGE      DESIRABLE   BORDERLINE HIGH   HIGH     0-19 Y     0 - 169       170 - 199     >/= 200    20-24 Y     0 - 189       190 - 224     >/= 225         >24 Y     0 - 199       200 - 239     >/= 240   **All ranges are based on fasting samples. Specific   therapeutic targets will vary based on patient-specific   cardiac risk.  .   Pediatric guidelines reference:Pediatrics 2011, 128(S5).   Adult guidelines reference: NCEP ATPIII Guidelines,     MICHAELLE 2001, 258:2486-97  .   Venipuncture immediately after or during the    administration of Metamizole may lead to falsely   low results. Testing should be performed immediately   prior to Metamizole dosing.    HDL 06/14/2023 41.9  mg/dL Final    .      AGE      VERY LOW   LOW     NORMAL    HIGH       0-19 Y       < 35   < 40     40-45      ----    20-24 Y       ----   < 40       >45     ----      >24 Y       ----   < 40     40-60      >60  .    Cholesterol/HDL Ratio 06/14/2023 4.6   Final    REF VALUES  DESIRABLE  < 3.4  HIGH RISK  > 5.0    LDL 06/14/2023 102 (H)  0 - 99 mg/dL Final    .                           NEAR      BORD      AGE      DESIRABLE  OPTIMAL    HIGH     HIGH     VERY HIGH     0-19 Y     0 - 109     ---    110-129   >/= 130     ----    20-24 Y     0 - 119     ---    120-159   >/= 160     ----      >24 Y     0 -  99   100-129  130-159   160-189     >/=190  .    VLDL 06/14/2023 47 (H)  0 - 40 mg/dL Final    Triglycerides 06/14/2023 237 (H)  0 - 149 mg/dL Final    .      AGE      DESIRABLE   BORDERLINE HIGH   HIGH     VERY HIGH   0 D-90 D    19 - 174         ----         ----        ----  91 D- 9 Y     0 -  74        75 -  99     >/= 100      ----    10-19 Y     0 -  89        90 - 129     >/= 130      ----    20-24 Y     0 - 114       115 - 149     >/= 150      ----         >24 Y     0 - 149       150 - 199    200- 499    >/= 500  .   Venipuncture immediately after or during the    administration of Metamizole may lead to falsely   low results. Testing should be performed immediately   prior to Metamizole dosing.    Non HDL Cholesterol 06/14/2023 149  mg/dL Final        AGE      DESIRABLE   BORDERLINE HIGH   HIGH     VERY HIGH     0-19 Y     0 - 119       120 - 144     >/= 145    >/= 160    20-24 Y     0 - 149       150 - 189     >/= 190      ----         >24 Y    30 MG/DL ABOVE LDL CHOLESTEROL GOAL  .       Problem List Items Addressed This Visit       Abdominal aortic aneurysm, without rupture, unspecified (CMS/HCC)    Current Assessment & Plan     Chronic Condition Documentation : Asymptomatic based on symptoms and exam.  Continue established treatment plan and follow-up at least yearly          Anemia    Current Assessment & Plan     We will have him decrease the Iron to once daily and recheck his blood count in 3 months.          "Benign prostatic hyperplasia with urinary obstruction    Essential hypertension    Current Assessment & Plan     Dietary sodium restriction.  Regular aerobic exercise program is recommended to help achieve and maintain normal body weight, fitness and improve lipid balance. .  Dietary changes: Increase soluble fiber  Plant sterols 2grams per day (e.g. Benecol)  Reduce saturated fat, \"trans\" monounsaturated fatty acids, and cholesterol           Relevant Orders    Follow Up In [facility] - PCP    Comprehensive Metabolic Panel    Lipid Panel    Mixed hyperlipidemia    Current Assessment & Plan     The nature of cardiac risk has been fully discussed with this patient. Discussed cardiovascular risk analysis and appropriate diet with the need for lifelong measures to reduce the risk. A regular exercise program is recommended to help achieve and maintain normal body weight, fitness and improve lipid balance. Patient education provided. They understand and agree with this course of treatment. They will return with new or worsening symptoms. Patient instructed to remain current with appropriate annual health maintenance.            Relevant Orders    Follow Up In [facility] - PCP    Comprehensive Metabolic Panel    Lipid Panel    Type 2 diabetes mellitus without complication, without long-term current use of insulin (CMS/[facility]) - Primary    Current Assessment & Plan     Diabetes Mellitus type II, under fair control.  1. Rx changes: None  2. Education: Reviewed ‘ABCs’ of diabetes management (respective goals in parentheses):  A1C (<7), blood pressure (<130/80), and cholesterol (LDL <100).  3. Compliance at present is estimated to be fair. Efforts to improve compliance (if necessary) will be directed at dietary modifications: and increased exercise.  4. Follow up: 3 months           Relevant Orders    Follow Up In [facility] - PCP    CBC and Auto Differential    Albumin , Urine Random    " Comprehensive Metabolic Panel    Hemoglobin A1C    Lipid Panel     Other Visit Diagnoses       Screening for malignant neoplasm of prostate        Relevant Orders    Prostate Specific Antigen, Screen          Patient to keep food diary and log of blood sugars and bring to next office visit. Patient encouraged to increase activity level gradually and encouraged weight loss. Strongly encouraged to maintain blood sugar at levels as close to normal as possible thus preventing or delaying complications (regular medical care is important for this). Encouraged to follow a balanced meal plan. Eat consistent and moderate amounts of food at regular times. Nuts and peanut butter are a good choice for a snack. Advised patient to not skip meals. Recommended that patient: Eat plenty of vegetables and fiber, limited amounts of fat, moderate amounts of protein and low-fat dairy products, and carefully limit foods containing high concentrated sugar. Keep a record of your food intake. We will review at the next visit. Educated patient about exercise. Exercise lowers blood glucose levels. Regular exercise (eg, 30-60 minutes of walking every day) can help keep blood glucose in better control. Exercise increases insulin sensitivity and helps an individual lose weight. It can also help overall health.     Scribe Attestation  By signing my name below, IJean Marie Scribe   attest that this documentation has been prepared under the direction and in the presence of Roger Wakefield MD.

## 2023-06-20 NOTE — ASSESSMENT & PLAN NOTE
Diabetes Mellitus type II, under fair control.  1. Rx changes: None  2. Education: Reviewed ‘ABCs’ of diabetes management (respective goals in parentheses):  A1C (<7), blood pressure (<130/80), and cholesterol (LDL <100).  3. Compliance at present is estimated to be fair. Efforts to improve compliance (if necessary) will be directed at dietary modifications: and increased exercise.  4. Follow up: 3 months

## 2023-06-20 NOTE — ASSESSMENT & PLAN NOTE
Chronic Condition Documentation : Asymptomatic based on symptoms and exam.  Continue established treatment plan and follow-up at least yearly

## 2023-08-08 ENCOUNTER — TELEPHONE (OUTPATIENT)
Dept: PRIMARY CARE | Facility: CLINIC | Age: 78
End: 2023-08-08
Payer: MEDICARE

## 2023-08-08 NOTE — TELEPHONE ENCOUNTER
NO SAMPLES AVAILABLE.     SPOE WITH WIFE SHE DOES NOT WANT RX SENT TO THE PHARMACY. SHE WILL WAIT UNTIL WE GET SAMPLES.

## 2023-08-23 DIAGNOSIS — E11.9 TYPE 2 DIABETES MELLITUS WITHOUT COMPLICATION, WITHOUT LONG-TERM CURRENT USE OF INSULIN (MULTI): ICD-10-CM

## 2023-08-23 NOTE — TELEPHONE ENCOUNTER
No samples. --- requesting Rx for Januvia 100 mg -- ddm lemuel PATEL 06/20/23  NOV 09/20/23     Medication pended please review for approval denial

## 2023-09-02 DIAGNOSIS — E78.2 MIXED HYPERLIPIDEMIA: ICD-10-CM

## 2023-09-03 RX ORDER — PRAVASTATIN SODIUM 80 MG/1
80 TABLET ORAL DAILY
Qty: 90 TABLET | Refills: 2 | Status: SHIPPED | OUTPATIENT
Start: 2023-09-03 | End: 2024-03-24

## 2023-09-14 ENCOUNTER — LAB (OUTPATIENT)
Dept: LAB | Facility: LAB | Age: 78
End: 2023-09-14
Payer: MEDICARE

## 2023-09-14 DIAGNOSIS — Z12.5 SCREENING FOR MALIGNANT NEOPLASM OF PROSTATE: ICD-10-CM

## 2023-09-14 DIAGNOSIS — E11.9 TYPE 2 DIABETES MELLITUS WITHOUT COMPLICATION, WITHOUT LONG-TERM CURRENT USE OF INSULIN (MULTI): ICD-10-CM

## 2023-09-14 DIAGNOSIS — E78.2 MIXED HYPERLIPIDEMIA: ICD-10-CM

## 2023-09-14 DIAGNOSIS — I10 ESSENTIAL HYPERTENSION: ICD-10-CM

## 2023-09-14 LAB
ALANINE AMINOTRANSFERASE (SGPT) (U/L) IN SER/PLAS: 10 U/L (ref 10–52)
ALBUMIN (G/DL) IN SER/PLAS: 4.4 G/DL (ref 3.4–5)
ALBUMIN (MG/L) IN URINE: 16.3 MG/L
ALBUMIN/CREATININE (UG/MG) IN URINE: 28.1 UG/MG CRT (ref 0–30)
ALKALINE PHOSPHATASE (U/L) IN SER/PLAS: 74 U/L (ref 33–136)
ANION GAP IN SER/PLAS: 15 MMOL/L (ref 10–20)
ASPARTATE AMINOTRANSFERASE (SGOT) (U/L) IN SER/PLAS: 9 U/L (ref 9–39)
BASOPHILS (10*3/UL) IN BLOOD BY AUTOMATED COUNT: 0.04 X10E9/L (ref 0–0.1)
BASOPHILS/100 LEUKOCYTES IN BLOOD BY AUTOMATED COUNT: 0.6 % (ref 0–2)
BILIRUBIN TOTAL (MG/DL) IN SER/PLAS: 0.5 MG/DL (ref 0–1.2)
CALCIUM (MG/DL) IN SER/PLAS: 9.5 MG/DL (ref 8.6–10.3)
CARBON DIOXIDE, TOTAL (MMOL/L) IN SER/PLAS: 25 MMOL/L (ref 21–32)
CHLORIDE (MMOL/L) IN SER/PLAS: 106 MMOL/L (ref 98–107)
CHOLESTEROL (MG/DL) IN SER/PLAS: 169 MG/DL (ref 0–199)
CHOLESTEROL IN HDL (MG/DL) IN SER/PLAS: 41.1 MG/DL
CHOLESTEROL/HDL RATIO: 4.1
CREATININE (MG/DL) IN SER/PLAS: 0.78 MG/DL (ref 0.5–1.3)
CREATININE (MG/DL) IN URINE: 58.1 MG/DL (ref 20–370)
EOSINOPHILS (10*3/UL) IN BLOOD BY AUTOMATED COUNT: 0.16 X10E9/L (ref 0–0.4)
EOSINOPHILS/100 LEUKOCYTES IN BLOOD BY AUTOMATED COUNT: 2.6 % (ref 0–6)
ERYTHROCYTE DISTRIBUTION WIDTH (RATIO) BY AUTOMATED COUNT: 13 % (ref 11.5–14.5)
ERYTHROCYTE MEAN CORPUSCULAR HEMOGLOBIN CONCENTRATION (G/DL) BY AUTOMATED: 32.8 G/DL (ref 32–36)
ERYTHROCYTE MEAN CORPUSCULAR VOLUME (FL) BY AUTOMATED COUNT: 96 FL (ref 80–100)
ERYTHROCYTES (10*6/UL) IN BLOOD BY AUTOMATED COUNT: 4.65 X10E12/L (ref 4.5–5.9)
ESTIMATED AVERAGE GLUCOSE FOR HBA1C: 137 MG/DL
GFR MALE: >90 ML/MIN/1.73M2
GLUCOSE (MG/DL) IN SER/PLAS: 168 MG/DL (ref 74–99)
HEMATOCRIT (%) IN BLOOD BY AUTOMATED COUNT: 44.8 % (ref 41–52)
HEMOGLOBIN (G/DL) IN BLOOD: 14.7 G/DL (ref 13.5–17.5)
HEMOGLOBIN A1C/HEMOGLOBIN TOTAL IN BLOOD: 6.4 %
IMMATURE GRANULOCYTES/100 LEUKOCYTES IN BLOOD BY AUTOMATED COUNT: 0.5 % (ref 0–0.9)
LDL: 84 MG/DL (ref 0–99)
LEUKOCYTES (10*3/UL) IN BLOOD BY AUTOMATED COUNT: 6.3 X10E9/L (ref 4.4–11.3)
LYMPHOCYTES (10*3/UL) IN BLOOD BY AUTOMATED COUNT: 1.25 X10E9/L (ref 0.8–3)
LYMPHOCYTES/100 LEUKOCYTES IN BLOOD BY AUTOMATED COUNT: 19.9 % (ref 13–44)
MONOCYTES (10*3/UL) IN BLOOD BY AUTOMATED COUNT: 0.55 X10E9/L (ref 0.05–0.8)
MONOCYTES/100 LEUKOCYTES IN BLOOD BY AUTOMATED COUNT: 8.8 % (ref 2–10)
NEUTROPHILS (10*3/UL) IN BLOOD BY AUTOMATED COUNT: 4.24 X10E9/L (ref 1.6–5.5)
NEUTROPHILS/100 LEUKOCYTES IN BLOOD BY AUTOMATED COUNT: 67.6 % (ref 40–80)
NON HDL CHOLESTEROL: 128 MG/DL
PLATELETS (10*3/UL) IN BLOOD AUTOMATED COUNT: 171 X10E9/L (ref 150–450)
POTASSIUM (MMOL/L) IN SER/PLAS: 4.1 MMOL/L (ref 3.5–5.3)
PROSTATE SPECIFIC ANTIGEN,SCREEN: 0.32 NG/ML (ref 0–4)
PROTEIN TOTAL: 6.4 G/DL (ref 6.4–8.2)
SODIUM (MMOL/L) IN SER/PLAS: 142 MMOL/L (ref 136–145)
TRIGLYCERIDE (MG/DL) IN SER/PLAS: 221 MG/DL (ref 0–149)
UREA NITROGEN (MG/DL) IN SER/PLAS: 15 MG/DL (ref 6–23)
VLDL: 44 MG/DL (ref 0–40)

## 2023-09-14 PROCEDURE — 82043 UR ALBUMIN QUANTITATIVE: CPT

## 2023-09-14 PROCEDURE — 80053 COMPREHEN METABOLIC PANEL: CPT

## 2023-09-14 PROCEDURE — 83036 HEMOGLOBIN GLYCOSYLATED A1C: CPT

## 2023-09-14 PROCEDURE — 85025 COMPLETE CBC W/AUTO DIFF WBC: CPT

## 2023-09-14 PROCEDURE — G0103 PSA SCREENING: HCPCS

## 2023-09-14 PROCEDURE — 82570 ASSAY OF URINE CREATININE: CPT

## 2023-09-14 PROCEDURE — 80061 LIPID PANEL: CPT

## 2023-09-14 PROCEDURE — 36415 COLL VENOUS BLD VENIPUNCTURE: CPT

## 2023-09-20 ENCOUNTER — OFFICE VISIT (OUTPATIENT)
Dept: PRIMARY CARE | Facility: CLINIC | Age: 78
End: 2023-09-20
Payer: MEDICARE

## 2023-09-20 VITALS
HEIGHT: 73 IN | HEART RATE: 75 BPM | BODY MASS INDEX: 27.35 KG/M2 | DIASTOLIC BLOOD PRESSURE: 62 MMHG | WEIGHT: 206.4 LBS | TEMPERATURE: 97 F | OXYGEN SATURATION: 96 % | RESPIRATION RATE: 21 BRPM | SYSTOLIC BLOOD PRESSURE: 114 MMHG

## 2023-09-20 DIAGNOSIS — Z23 NEED FOR INFLUENZA VACCINATION: ICD-10-CM

## 2023-09-20 DIAGNOSIS — N40.1 BENIGN PROSTATIC HYPERPLASIA WITH URINARY OBSTRUCTION: ICD-10-CM

## 2023-09-20 DIAGNOSIS — Z00.00 ANNUAL PHYSICAL EXAM: Primary | ICD-10-CM

## 2023-09-20 DIAGNOSIS — E11.9 TYPE 2 DIABETES MELLITUS WITHOUT COMPLICATION, WITHOUT LONG-TERM CURRENT USE OF INSULIN (MULTI): ICD-10-CM

## 2023-09-20 DIAGNOSIS — K21.9 GASTROESOPHAGEAL REFLUX DISEASE WITHOUT ESOPHAGITIS: ICD-10-CM

## 2023-09-20 DIAGNOSIS — N13.8 BENIGN PROSTATIC HYPERPLASIA WITH URINARY OBSTRUCTION: ICD-10-CM

## 2023-09-20 DIAGNOSIS — I10 ESSENTIAL HYPERTENSION: ICD-10-CM

## 2023-09-20 DIAGNOSIS — E78.2 MIXED HYPERLIPIDEMIA: ICD-10-CM

## 2023-09-20 PROCEDURE — 1160F RVW MEDS BY RX/DR IN RCRD: CPT | Performed by: FAMILY MEDICINE

## 2023-09-20 PROCEDURE — 90662 IIV NO PRSV INCREASED AG IM: CPT | Performed by: FAMILY MEDICINE

## 2023-09-20 PROCEDURE — 1036F TOBACCO NON-USER: CPT | Performed by: FAMILY MEDICINE

## 2023-09-20 PROCEDURE — G0008 ADMIN INFLUENZA VIRUS VAC: HCPCS | Performed by: FAMILY MEDICINE

## 2023-09-20 PROCEDURE — 3078F DIAST BP <80 MM HG: CPT | Performed by: FAMILY MEDICINE

## 2023-09-20 PROCEDURE — 3074F SYST BP LT 130 MM HG: CPT | Performed by: FAMILY MEDICINE

## 2023-09-20 PROCEDURE — 99397 PER PM REEVAL EST PAT 65+ YR: CPT | Performed by: FAMILY MEDICINE

## 2023-09-20 PROCEDURE — 1159F MED LIST DOCD IN RCRD: CPT | Performed by: FAMILY MEDICINE

## 2023-09-20 PROCEDURE — 99214 OFFICE O/P EST MOD 30 MIN: CPT | Performed by: FAMILY MEDICINE

## 2023-09-20 RX ORDER — GLIPIZIDE 10 MG/1
10 TABLET ORAL
Qty: 180 TABLET | Refills: 1 | Status: SHIPPED | OUTPATIENT
Start: 2023-09-20 | End: 2024-03-28 | Stop reason: SDUPTHER

## 2023-09-20 RX ORDER — LISINOPRIL 2.5 MG/1
2.5 TABLET ORAL DAILY
Qty: 90 TABLET | Refills: 1 | Status: SHIPPED | OUTPATIENT
Start: 2023-09-20 | End: 2024-03-28 | Stop reason: SDUPTHER

## 2023-09-20 RX ORDER — OMEPRAZOLE 20 MG/1
20 CAPSULE, DELAYED RELEASE ORAL DAILY
Qty: 90 CAPSULE | Refills: 1 | Status: SHIPPED | OUTPATIENT
Start: 2023-09-20 | End: 2024-03-28 | Stop reason: SDUPTHER

## 2023-09-20 RX ORDER — METFORMIN HYDROCHLORIDE 500 MG/1
1000 TABLET ORAL
Qty: 360 TABLET | Refills: 1 | Status: SHIPPED | OUTPATIENT
Start: 2023-09-20 | End: 2024-03-24

## 2023-09-20 RX ORDER — TAMSULOSIN HYDROCHLORIDE 0.4 MG/1
0.4 CAPSULE ORAL DAILY
Qty: 90 CAPSULE | Refills: 1 | Status: SHIPPED | OUTPATIENT
Start: 2023-09-20 | End: 2024-03-28 | Stop reason: SDUPTHER

## 2023-09-20 ASSESSMENT — PATIENT HEALTH QUESTIONNAIRE - PHQ9
SUM OF ALL RESPONSES TO PHQ9 QUESTIONS 1 AND 2: 0
1. LITTLE INTEREST OR PLEASURE IN DOING THINGS: NOT AT ALL
2. FEELING DOWN, DEPRESSED OR HOPELESS: NOT AT ALL

## 2023-09-20 NOTE — PROGRESS NOTES
Chief Complaint   Patient presents with    Annual Exam    Follow-up     Diabetes, Hypertension, Hyperlipidemia and labs      He presents today for annual physical exam and follow up on Diabetes Mellitus, hypertension, hyperlipidemia    Current  diabetes related symptoms include: none. Patient denies foot ulcerations, hypoglycemia , nausea, paresthesia of the feet, polydipsia, polyuria, visual disturbances, vomiting, and weight loss.  Patient denies chest pain, claudication, dyspnea, exertional chest pressure/discomfort, irregular heart beat, lower extremity edema, orthopnea, palpitations, paroxysmal nocturnal dyspnea, and syncope. Evaluation to date has included: fasting blood sugar, fasting lipid panel, hemoglobin A1C, and microalbuminuria.  Home sugars: BGs are running  consistent with Hgb A1C.   Patient denies chest pain, claudication, dyspnea, exertional chest pressure/discomfort, irregular heart beat, lower extremity edema, orthopnea, palpitations, paroxysmal nocturnal dyspnea, and syncope.     Past Medical History:   Diagnosis Date    Chest pain 01/16/2023    GI bleed 03/20/2023     Patient Active Problem List    Diagnosis Date Noted    Annual physical exam 09/20/2023    Routine general medical examination at [facility] care facility 03/21/2023    CAD (coronary artery disease) 03/20/2023    Benign prostatic hyperplasia with urinary obstruction 02/07/2023    Cervical radiculopathy 02/07/2023    Chronic idiopathic gout 02/07/2023    Constipation 02/07/2023    Diverticulosis 02/07/2023    Essential hypertension 02/07/2023    Gastroesophageal reflux disease without esophagitis 02/07/2023    Mixed hyperlipidemia 02/07/2023    Personal history of colonic polyps 02/07/2023    Rectal bleeding 02/07/2023    Right bundle branch block (RBBB) on electrocardiography 02/07/2023    Right flank pain 02/07/2023    Scalp lesion 02/07/2023    Shortness of breath 02/07/2023    Urinary frequency 02/07/2023    Urinary urgency  LM for patient to return call. 02/07/2023    UTI symptoms 02/07/2023    Diarrhea 02/07/2023    Glycosuria 02/07/2023    Type 2 diabetes mellitus without complication, without long-term current use of insulin (CMS/[facility])     Anemia 01/20/2023    Unspecified right bundle-branch block 11/23/2022    Abdominal aortic aneurysm, without rupture, unspecified (CMS/[facility]) 11/06/2022    Acute posthemorrhagic anemia 11/06/2022    Hyperkalemia 11/06/2022    Hypotension, unspecified 11/06/2022    Cyst of kidney, acquired 08/19/2022    Spondylolisthesis, cervical region 06/20/2022    Microalbuminuria 04/22/2016     Past Surgical History:   Procedure Laterality Date    OTHER SURGICAL HISTORY  06/05/2019    Hernia repair    OTHER SURGICAL HISTORY  08/12/2022    Colonoscopy    OTHER SURGICAL HISTORY  06/11/2019    Cardiac catheterization    OTHER SURGICAL HISTORY  06/11/2019    Tonsillectomy    OTHER SURGICAL HISTORY  06/11/2019    Cataract surgery     No family history on file.    Social History     Socioeconomic History    Marital status:      Spouse name: None    Number of children: None    Years of education: None    Highest education level: None   Occupational History    None   Tobacco Use    Smoking status: Never    Smokeless tobacco: Never   Vaping Use    Vaping Use: Never used   Substance and Sexual Activity    Alcohol use: Not Currently    Drug use: Never    Sexual activity: Defer   Other Topics Concern    None   Social History Narrative    None     Social Determinants of Health     Financial Resource Strain: Not on file   Food Insecurity: Not on file   Transportation Needs: Not on file   Physical Activity: Not on file   Stress: Not on file   Social Connections: Not on file   Intimate Partner Violence: Not on file   Housing Stability: Not on file     Current Outpatient Medications   Medication Sig Dispense Refill    aspirin 81 mg EC tablet Take 1 tablet (81 mg) by mouth once daily.      aspirin 81 mg EC tablet Take 1 tablet (81 mg) by mouth once daily.       blood sugar diagnostic (Blood Glucose Test) strip 3 times a day.      celecoxib (CeleBREX) 200 mg capsule Take 1 capsule (200 mg) by mouth 2 times a day.      ertugliflozin (Steglatro) tablet Take 1 tablet (15 mg) by mouth once daily. 70 tablet 0    fenofibrate (Triglide) 160 mg tablet Take 1 tablet (160 mg) by mouth once daily.      ferrous sulfate 324 mg (65 mg iron) EC tablet Take 1 tablet (65 mg) by mouth once daily with a meal.      lancets 30 gauge misc in the morning, at noon, and at bedtime.      niacin 1,000 mg ER tablet Take 1 tablet (1,000 mg) by mouth once daily.      pravastatin (Pravachol) 80 mg tablet TAKE 1 TABLET BY MOUTH DAILY 90 tablet 2    SITagliptin phosphate (Januvia) 100 mg tablet Take 1 tablet (100 mg) by mouth once daily. 90 tablet 1    glipiZIDE (Glucotrol) 10 mg tablet Take 1 tablet (10 mg) by mouth 2 times a day before meals. 180 tablet 1    lisinopril 2.5 mg tablet Take 1 tablet (2.5 mg) by mouth once daily. 90 tablet 1    metFORMIN (Glucophage) 500 mg tablet Take 2 tablets (1,000 mg) by mouth 2 times a day with meals. 360 tablet 1    omeprazole (PriLOSEC) 20 mg DR capsule Take 1 capsule (20 mg) by mouth once daily. 90 capsule 1    tamsulosin (Flomax) 0.4 mg 24 hr capsule Take 1 capsule (0.4 mg) by mouth once daily. 90 capsule 1     No current facility-administered medications for this visit.     Current Outpatient Medications on File Prior to Visit   Medication Sig Dispense Refill    aspirin 81 mg EC tablet Take 1 tablet (81 mg) by mouth once daily.      aspirin 81 mg EC tablet Take 1 tablet (81 mg) by mouth once daily.      blood sugar diagnostic (Blood Glucose Test) strip 3 times a day.      celecoxib (CeleBREX) 200 mg capsule Take 1 capsule (200 mg) by mouth 2 times a day.      ertugliflozin (Steglatro) tablet Take 1 tablet (15 mg) by mouth once daily. 70 tablet 0    fenofibrate (Triglide) 160 mg tablet Take 1 tablet (160 mg) by mouth once daily.      ferrous sulfate 324 mg (65  mg iron) EC tablet Take 1 tablet (65 mg) by mouth once daily with a meal.      lancets 30 gauge misc in the morning, at noon, and at bedtime.      niacin 1,000 mg ER tablet Take 1 tablet (1,000 mg) by mouth once daily.      pravastatin (Pravachol) 80 mg tablet TAKE 1 TABLET BY MOUTH DAILY 90 tablet 2    SITagliptin phosphate (Januvia) 100 mg tablet Take 1 tablet (100 mg) by mouth once daily. 90 tablet 1    [DISCONTINUED] glipiZIDE (Glucotrol) 10 mg tablet Take 1 tablet (10 mg) by mouth in the morning and 1 tablet (10 mg) in the evening. Take before meals. 180 tablet 1    [DISCONTINUED] lisinopril 2.5 mg tablet Take 1 tablet (2.5 mg) by mouth once daily. 90 tablet 1    [DISCONTINUED] metFORMIN (Glucophage) 500 mg tablet Take 2 tablets (1,000 mg) by mouth in the morning and 2 tablets (1,000 mg) in the evening. Take with meals. 360 tablet 1    [DISCONTINUED] omeprazole (PriLOSEC) 20 mg DR capsule Take 1 capsule (20 mg) by mouth once daily. 90 capsule 1    [DISCONTINUED] tamsulosin (Flomax) 0.4 mg 24 hr capsule Take 1 capsule (0.4 mg) by mouth once daily. 90 capsule 1     No current facility-administered medications on file prior to visit.     Allergies   Allergen Reactions    Atorvastatin Unknown    Rosuvastatin Unknown          Review of Systems - General ROS: negative for - fatigue, fever, malaise, night sweats, sleep disturbance or weight loss  Psychological ROS: negative for - anxiety, concentration difficulties, depression, memory difficulties or sleep disturbances  ENT ROS: negative for - hearing change, nasal discharge, oral lesions, sinus pain, sore throat, tinnitus or vertigo  Allergy and Immunology ROS: negative for - hives, nasal congestion or seasonal allergies  Hematological and Lymphatic ROS: negative for - bruising, fatigue, night sweats or pallor  Endocrine ROS: negative for - hot flashes, malaise/lethargy, palpitations, polydipsia/polyuria, skin changes, temperature intolerance or unexpected weight  "changes  Respiratory ROS: negative for - cough, hemoptysis, pleuritic pain, shortness of breath or wheezing  Cardiovascular ROS: no chest pain or dyspnea on exertion  Gastrointestinal ROS: no abdominal pain, change in bowel habits, or black or bloody stools  Genito-Urinary ROS: no dysuria, trouble voiding, or hematuria  Musculoskeletal ROS: negative for - joint pain, joint stiffness, joint swelling, muscle pain or muscular weakness  Neurological ROS: negative for - dizziness, gait disturbance, headaches, impaired coordination/balance, numbness/tingling, tremors or visual changes  Dermatological ROS: negative for - dry skin, lumps, pruritus or rash      Blood pressure 114/62, pulse 75, temperature 36.1 °C (97 °F), resp. rate 21, height 1.854 m (6' 1\"), weight 93.6 kg (206 lb 6.4 oz), SpO2 96 %.    Physical Examination: General appearance - alert, well appearing, and in no distress  Mental status - alert, oriented to person, place, and time  Eyes - pupils equal and reactive, extraocular eye movements intact  Ears - bilateral TM's and external ear canals normal  Mouth - mucous membranes moist, pharynx normal without lesions  Neck - supple, no significant adenopathy  Lymphatics - no palpable lymphadenopathy, no hepatosplenomegaly  Chest - clear to auscultation, no wheezes, rales or rhonchi, symmetric air entry  Heart - normal rate, regular rhythm, normal S1, S2, no murmurs, rubs, clicks or gallops  Abdomen - soft, nontender, nondistended, no masses or organomegaly  Neurological - alert, oriented, normal speech, no focal findings or movement disorder noted  Musculoskeletal - no joint tenderness, deformity or swelling  Extremities: peripheral pulses normal, no pedal edema, no clubbing or cyanosis.      Lab on 09/14/2023   Component Date Value Ref Range Status    WBC 09/14/2023 6.3  4.4 - 11.3 x10E9/L Final    RBC 09/14/2023 4.65  4.50 - 5.90 x10E12/L Final    Hemoglobin 09/14/2023 14.7  13.5 - 17.5 g/dL Final    " Hematocrit 09/14/2023 44.8  41.0 - 52.0 % Final    MCV 09/14/2023 96  80 - 100 fL Final    MCHC 09/14/2023 32.8  32.0 - 36.0 g/dL Final    Platelets 09/14/2023 171  150 - 450 x10E9/L Final    RDW 09/14/2023 13.0  11.5 - 14.5 % Final    Neutrophils % 09/14/2023 67.6  40.0 - 80.0 % Final    Immature Granulocytes %, Automated 09/14/2023 0.5  0.0 - 0.9 % Final     Immature Granulocyte Count (IG) includes promyelocytes,    myelocytes and metamyelocytes but does not include bands.   Percent differential counts (%) should be interpreted in the   context of the absolute cell counts (cells/L).    Lymphocytes % 09/14/2023 19.9  13.0 - 44.0 % Final    Monocytes % 09/14/2023 8.8  2.0 - 10.0 % Final    Eosinophils % 09/14/2023 2.6  0.0 - 6.0 % Final    Basophils % 09/14/2023 0.6  0.0 - 2.0 % Final    Neutrophils Absolute 09/14/2023 4.24  1.60 - 5.50 x10E9/L Final    Lymphocytes Absolute 09/14/2023 1.25  0.80 - 3.00 x10E9/L Final    Monocytes Absolute 09/14/2023 0.55  0.05 - 0.80 x10E9/L Final    Eosinophils Absolute 09/14/2023 0.16  0.00 - 0.40 x10E9/L Final    Basophils Absolute 09/14/2023 0.04  0.00 - 0.10 x10E9/L Final    ALBUMIN (MG/L) IN URINE 09/14/2023 16.3  Not Established mg/L Final    Albumin/Creatine Ratio 09/14/2023 28.1  0.0 - 30.0 ug/mg crt Final    Creatinine, Urine 09/14/2023 58.1  20.0 - 370.0 mg/dL Final    Glucose 09/14/2023 168 (H)  74 - 99 mg/dL Final    Sodium 09/14/2023 142  136 - 145 mmol/L Final    Potassium 09/14/2023 4.1  3.5 - 5.3 mmol/L Final    Chloride 09/14/2023 106  98 - 107 mmol/L Final    Bicarbonate 09/14/2023 25  21 - 32 mmol/L Final    Anion Gap 09/14/2023 15  10 - 20 mmol/L Final    Urea Nitrogen 09/14/2023 15  6 - 23 mg/dL Final    Creatinine 09/14/2023 0.78  0.50 - 1.30 mg/dL Final    GFR MALE 09/14/2023 >90  >90 mL/min/1.73m2 Final     CALCULATIONS OF ESTIMATED GFR ARE PERFORMED   USING THE 2021 CKD-EPI STUDY REFIT EQUATION   WITHOUT THE RACE VARIABLE FOR THE IDMS-TRACEABLE   CREATININE  METHODS.    https://jasn.asnjournals.org/content/early/2021/09/22/ASN.[email or web address]    Calcium 09/14/2023 9.5  8.6 - 10.3 mg/dL Final    Albumin 09/14/2023 4.4  3.4 - 5.0 g/dL Final    Alkaline Phosphatase 09/14/2023 74  33 - 136 U/L Final    Total Protein 09/14/2023 6.4  6.4 - 8.2 g/dL Final    AST 09/14/2023 9  9 - 39 U/L Final    Total Bilirubin 09/14/2023 0.5  0.0 - 1.2 mg/dL Final    ALT (SGPT) 09/14/2023 10  10 - 52 U/L Final     Patients treated with Sulfasalazine may generate    falsely decreased results for ALT.    Hemoglobin A1C 09/14/2023 6.4 (A)  % Final         Diagnosis of Diabetes-Adults   Non-Diabetic: < or = 5.6%   Increased risk for developing diabetes: 5.7-6.4%   Diagnostic of diabetes: > or = 6.5%  .       Monitoring of Diabetes                Age (y)     Therapeutic Goal (%)   Adults:          >18           <7.0   Pediatrics:    13-18           <7.5                   7-12           <8.0                   0- 6            7.5-8.5   American Diabetes Association. Diabetes Care 33(S1), Jan 2010.    Estimated Average Glucose 09/14/2023 137  MG/DL Final    Cholesterol 09/14/2023 169  0 - 199 mg/dL Final    .      AGE      DESIRABLE   BORDERLINE HIGH   HIGH     0-19 Y     0 - 169       170 - 199     >/= 200    20-24 Y     0 - 189       190 - 224     >/= 225         >24 Y     0 - 199       200 - 239     >/= 240   **All ranges are based on fasting samples. Specific   therapeutic targets will vary based on patient-specific   cardiac risk.  .   Pediatric guidelines reference:Pediatrics 2011, 128(S5).   Adult guidelines reference: NCEP ATPIII Guidelines,     MICHAELLE 2001, 258:2486-97  .   Venipuncture immediately after or during the    administration of Metamizole may lead to falsely   low results. Testing should be performed immediately   prior to Metamizole dosing.    HDL 09/14/2023 41.1  mg/dL Final    .      AGE      VERY LOW   LOW     NORMAL    HIGH       0-19 Y       < 35   < 40     40-45     ----    20-24  Y       ----   < 40       >45     ----      >24 Y       ----   < 40     40-60      >60  .    Cholesterol/HDL Ratio 09/14/2023 4.1   Final    REF VALUES  DESIRABLE  < 3.4  HIGH RISK  > 5.0    LDL 09/14/2023 84  0 - 99 mg/dL Final    .                           NEAR      BORD      AGE      DESIRABLE  OPTIMAL    HIGH     HIGH     VERY HIGH     0-19 Y     0 - 109     ---    110-129   >/= 130     ----    20-24 Y     0 - 119     ---    120-159   >/= 160     ----      >24 Y     0 -  99   100-129  130-159   160-189     >/=190  .    VLDL 09/14/2023 44 (H)  0 - 40 mg/dL Final    Triglycerides 09/14/2023 221 (H)  0 - 149 mg/dL Final    .      AGE      DESIRABLE   BORDERLINE HIGH   HIGH     VERY HIGH   0 D-90 D    19 - 174         ----         ----        ----  91 D- 9 Y     0 -  74        75 -  99     >/= 100      ----    10-19 Y     0 -  89        90 - 129     >/= 130      ----    20-24 Y     0 - 114       115 - 149     >/= 150      ----         >24 Y     0 - 149       150 - 199    200- 499    >/= 500  .   Venipuncture immediately after or during the    administration of Metamizole may lead to falsely   low results. Testing should be performed immediately   prior to Metamizole dosing.    Non HDL Cholesterol 09/14/2023 128  mg/dL Final        AGE      DESIRABLE   BORDERLINE HIGH   HIGH     VERY HIGH     0-19 Y     0 - 119       120 - 144     >/= 145    >/= 160    20-24 Y     0 - 149       150 - 189     >/= 190      ----         >24 Y    30 MG/DL ABOVE LDL CHOLESTEROL GOAL  .    Prostate Specific Antigen,Screen 09/14/2023 0.32  0.00 - 4.00 ng/mL Final    The FDA requires that the method used for PSA assay be   reported to the physician. Values obtained with different   assay methods must not be used interchangeably. This test  was performed at [facility] using the Access   Hybritech PSA assay is a two-site immunoenzymatic sandwich   assay. The assay is approved for measurement of   prostate-specific antigen  (PSA)in serum and may be used   in conjunction with a digital rectal examination in men   50 years and older as an aid in detection of prostate   cancer.  5-Alpha-reductase inhibitors (e.g. Proscar, Finasteride,   Avodart, Dutasteride and Maria C) for the treatment of BPH   have been shown to lower PSA levels by an average of 50%   after 6 months of treatment.       Problem List Items Addressed This Visit       Annual physical exam - Primary    Benign prostatic hyperplasia with urinary obstruction    Current Assessment & Plan     Well-controlled, continue current medications and management.         Relevant Medications    tamsulosin (Flomax) 0.4 mg 24 hr capsule    Essential hypertension    Current Assessment & Plan     Well-controlled, continue current medications and management.         Relevant Medications    lisinopril 2.5 mg tablet    Other Relevant Orders    Follow Up In [facility] - PCP - Established    CBC and Auto Differential    Comprehensive Metabolic Panel    Hemoglobin A1C    Lipid Panel    Gastroesophageal reflux disease without esophagitis    Current Assessment & Plan     Well-controlled, continue current medications and management.         Relevant Medications    omeprazole (PriLOSEC) 20 mg DR capsule    Mixed hyperlipidemia    Current Assessment & Plan     Well-controlled, continue current medications and management.         Relevant Orders    Follow Up In [facility] - PCP - Established    CBC and Auto Differential    Comprehensive Metabolic Panel    Hemoglobin A1C    Lipid Panel    Type 2 diabetes mellitus without complication, without long-term current use of insulin (CMS/[facility])    Current Assessment & Plan     Diabetes Mellitus type II, under good control.  1. Rx changes: None  2. Education: Reviewed ‘ABCs’ of diabetes management (respective goals in parentheses):  A1C (<7), blood pressure (<130/80), and cholesterol (LDL <100).  3. Compliance at present is estimated to be good.  Efforts to improve compliance (if necessary) will be directed at dietary modifications: and increased exercise.  4. Follow up: 3 months           Relevant Medications    glipiZIDE (Glucotrol) 10 mg tablet    metFORMIN (Glucophage) 500 mg tablet    Other Relevant Orders    Follow Up In [facility] - PCP - Established    CBC and Auto Differential    Comprehensive Metabolic Panel    Hemoglobin A1C    Lipid Panel     Other Visit Diagnoses       Need for influenza vaccination        Relevant Orders    Flu vaccine, quadrivalent, high-dose, preservative free, age 65y+ (FLUZONE) (Completed)          Patient to keep food diary and log of blood sugars and bring to next office visit. Patient encouraged to increase activity level gradually and encouraged weight loss. Strongly encouraged to maintain blood sugar at levels as close to normal as possible thus preventing or delaying complications (regular medical care is important for this). Encouraged to follow a balanced meal plan. Eat consistent and moderate amounts of food at regular times. Nuts and peanut butter are a good choice for a snack. Advised patient to not skip meals. Recommended that patient: Eat plenty of vegetables and fiber, limited amounts of fat, moderate amounts of protein and low-fat dairy products, and carefully limit foods containing high concentrated sugar. Keep a record of your food intake. We will review at the next visit. Educated patient about exercise. Exercise lowers blood glucose levels. Regular exercise (eg, 30-60 minutes of walking every day) can help keep blood glucose in better control. Exercise increases insulin sensitivity and helps an individual lose weight. It can also help overall health.     Scribe Attestation  By signing my name below, IJean Marie Scribe   attest that this documentation has been prepared under the direction and in the presence of Roger Wakefield MD.

## 2023-11-28 ENCOUNTER — TELEPHONE (OUTPATIENT)
Dept: PRIMARY CARE | Facility: CLINIC | Age: 78
End: 2023-11-28
Payer: MEDICARE

## 2023-11-28 NOTE — TELEPHONE ENCOUNTER
Currently do not have any 15mg but a call was placed to the drug rep by [employer].    Patient made aware and states he has enough for the week plus one day so he will call back Friday to see if we got any samples in and if not we can supply him with 5mg samples and he will need to take 3 tablets.

## 2023-12-07 ENCOUNTER — TELEMEDICINE (OUTPATIENT)
Dept: PHARMACY | Facility: HOSPITAL | Age: 78
End: 2023-12-07
Payer: MEDICARE

## 2023-12-07 DIAGNOSIS — E11.9 TYPE 2 DIABETES MELLITUS WITHOUT COMPLICATION, WITHOUT LONG-TERM CURRENT USE OF INSULIN (MULTI): ICD-10-CM

## 2023-12-07 DIAGNOSIS — E11.9 TYPE 2 DIABETES MELLITUS WITHOUT COMPLICATION, WITHOUT LONG-TERM CURRENT USE OF INSULIN (MULTI): Primary | ICD-10-CM

## 2023-12-07 NOTE — PROGRESS NOTES
Pharmacist Clinic: Medication Cost    Scottie Blandon is a 78 y.o. male who presents for Diabetes.     Referring Provider: Roger Wakefield MD    Patient's wife presents to Clinical Pharmacy team per PCP to assist with medication cost of patient's [place]. Patient receives most medications for free with the exception of Januvia and Steglatro.     Discussed  VAF application process with patient; while Steglatro is not a part of the program's formulary, Januvia is eligible. Patient's wife is agreeable to begin application and will have daughter, Tabatha, send [facility] over the required tax documents. Patient will continue to receive Steglatro samples from PCP office for now.     Patient Assistance Screening (VAF)     Patient verbally reports monthly or yearly income which is less than 400% federal poverty level     Application for program will be submitted for the following medication:   Januvia 100 mg once daily     Patient has been informed that program team will be reaching out to them to discuss necessary documentation, instructed to answer phone/return voicemail.      Patient aware this process may take up to 6 weeks.      If approved medication must be filled through  [facility] and may be picked up or mailed to patient. Will arrange for medications to be shipped to [facility] where Tabatha works, as patient has had issues with getting medications delivered to their house in the past.    Thank you,  Cyndi Christensen, ParthaD

## 2023-12-12 ENCOUNTER — LAB (OUTPATIENT)
Dept: LAB | Facility: LAB | Age: 78
End: 2023-12-12
Payer: MEDICARE

## 2023-12-12 DIAGNOSIS — E78.2 MIXED HYPERLIPIDEMIA: ICD-10-CM

## 2023-12-12 DIAGNOSIS — I10 ESSENTIAL HYPERTENSION: ICD-10-CM

## 2023-12-12 DIAGNOSIS — E11.9 TYPE 2 DIABETES MELLITUS WITHOUT COMPLICATION, WITHOUT LONG-TERM CURRENT USE OF INSULIN (MULTI): ICD-10-CM

## 2023-12-12 LAB
ALBUMIN SERPL BCP-MCNC: 4.4 G/DL (ref 3.4–5)
ALP SERPL-CCNC: 53 U/L (ref 33–136)
ALT SERPL W P-5'-P-CCNC: 13 U/L (ref 10–52)
ANION GAP SERPL CALC-SCNC: 17 MMOL/L (ref 10–20)
AST SERPL W P-5'-P-CCNC: 13 U/L (ref 9–39)
BASOPHILS # BLD AUTO: 0.04 X10*3/UL (ref 0–0.1)
BASOPHILS NFR BLD AUTO: 0.6 %
BILIRUB SERPL-MCNC: 0.6 MG/DL (ref 0–1.2)
BUN SERPL-MCNC: 16 MG/DL (ref 6–23)
CALCIUM SERPL-MCNC: 9.6 MG/DL (ref 8.6–10.3)
CHLORIDE SERPL-SCNC: 99 MMOL/L (ref 98–107)
CHOLEST SERPL-MCNC: 225 MG/DL (ref 0–199)
CHOLESTEROL/HDL RATIO: 5
CO2 SERPL-SCNC: 24 MMOL/L (ref 21–32)
CREAT SERPL-MCNC: 1.03 MG/DL (ref 0.5–1.3)
EOSINOPHIL # BLD AUTO: 0.11 X10*3/UL (ref 0–0.4)
EOSINOPHIL NFR BLD AUTO: 1.6 %
ERYTHROCYTE [DISTWIDTH] IN BLOOD BY AUTOMATED COUNT: 12.6 % (ref 11.5–14.5)
EST. AVERAGE GLUCOSE BLD GHB EST-MCNC: 171 MG/DL
GFR SERPL CREATININE-BSD FRML MDRD: 75 ML/MIN/1.73M*2
GLUCOSE SERPL-MCNC: 182 MG/DL (ref 74–99)
HBA1C MFR BLD: 7.6 %
HCT VFR BLD AUTO: 48.2 % (ref 41–52)
HDLC SERPL-MCNC: 45.2 MG/DL
HGB BLD-MCNC: 16.1 G/DL (ref 13.5–17.5)
IMM GRANULOCYTES # BLD AUTO: 0.09 X10*3/UL (ref 0–0.5)
IMM GRANULOCYTES NFR BLD AUTO: 1.3 % (ref 0–0.9)
LDLC SERPL CALC-MCNC: 115 MG/DL
LYMPHOCYTES # BLD AUTO: 2.04 X10*3/UL (ref 0.8–3)
LYMPHOCYTES NFR BLD AUTO: 29.1 %
MCH RBC QN AUTO: 31 PG (ref 26–34)
MCHC RBC AUTO-ENTMCNC: 33.4 G/DL (ref 32–36)
MCV RBC AUTO: 93 FL (ref 80–100)
MONOCYTES # BLD AUTO: 0.72 X10*3/UL (ref 0.05–0.8)
MONOCYTES NFR BLD AUTO: 10.3 %
NEUTROPHILS # BLD AUTO: 4.02 X10*3/UL (ref 1.6–5.5)
NEUTROPHILS NFR BLD AUTO: 57.1 %
NON HDL CHOLESTEROL: 180 MG/DL (ref 0–149)
NRBC BLD-RTO: 0 /100 WBCS (ref 0–0)
PLATELET # BLD AUTO: 218 X10*3/UL (ref 150–450)
POTASSIUM SERPL-SCNC: 3.8 MMOL/L (ref 3.5–5.3)
PROT SERPL-MCNC: 6.8 G/DL (ref 6.4–8.2)
RBC # BLD AUTO: 5.2 X10*6/UL (ref 4.5–5.9)
SODIUM SERPL-SCNC: 136 MMOL/L (ref 136–145)
TRIGL SERPL-MCNC: 325 MG/DL (ref 0–149)
VLDL: 65 MG/DL (ref 0–40)
WBC # BLD AUTO: 7 X10*3/UL (ref 4.4–11.3)

## 2023-12-12 PROCEDURE — 85025 COMPLETE CBC W/AUTO DIFF WBC: CPT

## 2023-12-12 PROCEDURE — RXMED WILLOW AMBULATORY MEDICATION CHARGE

## 2023-12-12 PROCEDURE — 83036 HEMOGLOBIN GLYCOSYLATED A1C: CPT

## 2023-12-12 PROCEDURE — 36415 COLL VENOUS BLD VENIPUNCTURE: CPT

## 2023-12-12 PROCEDURE — 80061 LIPID PANEL: CPT

## 2023-12-12 PROCEDURE — 80053 COMPREHEN METABOLIC PANEL: CPT

## 2023-12-14 ENCOUNTER — PHARMACY VISIT (OUTPATIENT)
Dept: PHARMACY | Facility: CLINIC | Age: 78
End: 2023-12-14
Payer: MEDICARE

## 2023-12-14 ENCOUNTER — OFFICE VISIT (OUTPATIENT)
Dept: PRIMARY CARE | Facility: CLINIC | Age: 78
End: 2023-12-14
Payer: MEDICARE

## 2023-12-14 VITALS
HEART RATE: 80 BPM | DIASTOLIC BLOOD PRESSURE: 70 MMHG | HEIGHT: 73 IN | RESPIRATION RATE: 22 BRPM | WEIGHT: 210.2 LBS | SYSTOLIC BLOOD PRESSURE: 120 MMHG | BODY MASS INDEX: 27.86 KG/M2 | OXYGEN SATURATION: 97 % | TEMPERATURE: 97.2 F

## 2023-12-14 DIAGNOSIS — N13.8 BENIGN PROSTATIC HYPERPLASIA WITH URINARY OBSTRUCTION: ICD-10-CM

## 2023-12-14 DIAGNOSIS — I10 ESSENTIAL HYPERTENSION: ICD-10-CM

## 2023-12-14 DIAGNOSIS — K21.9 GASTROESOPHAGEAL REFLUX DISEASE WITHOUT ESOPHAGITIS: ICD-10-CM

## 2023-12-14 DIAGNOSIS — N40.1 BENIGN PROSTATIC HYPERPLASIA WITH URINARY OBSTRUCTION: ICD-10-CM

## 2023-12-14 DIAGNOSIS — E78.2 MIXED HYPERLIPIDEMIA: ICD-10-CM

## 2023-12-14 DIAGNOSIS — S46.002A ROTATOR CUFF INJURY, LEFT, INITIAL ENCOUNTER: ICD-10-CM

## 2023-12-14 DIAGNOSIS — E11.9 TYPE 2 DIABETES MELLITUS WITHOUT COMPLICATION, WITHOUT LONG-TERM CURRENT USE OF INSULIN (MULTI): Primary | ICD-10-CM

## 2023-12-14 PROCEDURE — 99214 OFFICE O/P EST MOD 30 MIN: CPT | Performed by: FAMILY MEDICINE

## 2023-12-14 PROCEDURE — 1160F RVW MEDS BY RX/DR IN RCRD: CPT | Performed by: FAMILY MEDICINE

## 2023-12-14 PROCEDURE — 3074F SYST BP LT 130 MM HG: CPT | Performed by: FAMILY MEDICINE

## 2023-12-14 PROCEDURE — 1159F MED LIST DOCD IN RCRD: CPT | Performed by: FAMILY MEDICINE

## 2023-12-14 PROCEDURE — 3078F DIAST BP <80 MM HG: CPT | Performed by: FAMILY MEDICINE

## 2023-12-14 PROCEDURE — 1036F TOBACCO NON-USER: CPT | Performed by: FAMILY MEDICINE

## 2023-12-14 ASSESSMENT — PATIENT HEALTH QUESTIONNAIRE - PHQ9
SUM OF ALL RESPONSES TO PHQ9 QUESTIONS 1 AND 2: 0
2. FEELING DOWN, DEPRESSED OR HOPELESS: NOT AT ALL
1. LITTLE INTEREST OR PLEASURE IN DOING THINGS: NOT AT ALL

## 2023-12-14 ASSESSMENT — ENCOUNTER SYMPTOMS
HEADACHES: 0
DYSURIA: 0
VOMITING: 0
WHEEZING: 0
FEVER: 0
BLURRED VISION: 0
COUGH: 0
SHORTNESS OF BREATH: 0
WEIGHT LOSS: 0
DIZZINESS: 0
NUMBNESS: 1
MUSCLE WEAKNESS: 0
RHINORRHEA: 0
HEMATURIA: 0
DIARRHEA: 0
POLYPHAGIA: 0
FREQUENCY: 0
WEAKNESS: 0
SORE THROAT: 0
VISUAL CHANGE: 0
TINGLING: 1
CONSTIPATION: 0
PALPITATIONS: 0
ABDOMINAL PAIN: 0
CHILLS: 0
POLYDIPSIA: 0
TREMORS: 0
FATIGUE: 0

## 2023-12-14 NOTE — ASSESSMENT & PLAN NOTE
Diabetes Mellitus type II, under poor control.  1. Rx changes:   2. Education: Reviewed ‘ABCs’ of diabetes management (respective goals in parentheses):  A1C (<7), blood pressure (<130/80), and cholesterol (LDL <100).  3. Compliance at present is estimated to be poor. Efforts to improve compliance (if necessary) will be directed at dietary modifications: and increased exercise.  4. Follow up: 3 months

## 2023-12-14 NOTE — PROGRESS NOTES
Subjective   Patient ID: Scottie Blandon is a 78 y.o. male who presents for Follow-up (Diabetes, Hypertension, Hyperlipidemia and labs) and Shoulder Injury.    Patient is here for follow-up on hypertension and hyperlipidemia. He has no chest pain, dyspnea, exertional chest pressure/discomfort, near-syncope, orthopnea, palpitations, paroxysmal nocturnal dyspnea, and syncope. Taking his medication regularly with no side effects.    Shoulder Injury   [place]. The left shoulder is affected. Incident onset: 5 weeks ago. The injury mechanism was a fall. The quality of the pain is described as stabbing, shooting, cramping, burning and aching. The pain radiates to the left arm. The pain is at a severity of 3/10. The pain is mild. Associated symptoms include numbness and tingling. Pertinent negatives include no chest pain or muscle weakness. The symptoms are aggravated by movement and overhead lifting. He has tried ice and heat for the symptoms.   Diabetes  He presents for his follow-up diabetic visit. He has type 2 diabetes mellitus. His disease course has been worsening. There are no hypoglycemic associated symptoms. Pertinent negatives for hypoglycemia include no dizziness, headaches or tremors. Pertinent negatives for diabetes include no blurred vision, no chest pain, no fatigue, no foot paresthesias, no foot ulcerations, no polydipsia, no polyphagia, no polyuria, no visual change, no weakness and no weight loss. Risk factors for coronary artery disease include diabetes mellitus, dyslipidemia, hypertension and male sex.      Review of Systems   Constitutional:  Negative for chills, fatigue, fever and weight loss.   HENT:  Negative for congestion, ear pain, nosebleeds, rhinorrhea and sore throat.    Eyes:  Negative for blurred vision.   Respiratory:  Negative for cough, shortness of breath and wheezing.    Cardiovascular:  Negative for chest pain, palpitations and leg swelling.   Gastrointestinal:   "Negative for abdominal pain, constipation, diarrhea and vomiting.   Endocrine: Negative for polydipsia, polyphagia and polyuria.   Genitourinary:  Negative for dysuria, frequency and hematuria.   Neurological:  Positive for tingling and numbness. Negative for dizziness, tremors, weakness and headaches.       Objective   /70   Pulse 80   Temp 36.2 °C (97.2 °F)   Resp 22   Ht 1.854 m (6' 1\")   Wt 95.3 kg (210 lb 3.2 oz)   SpO2 97%   BMI 27.73 kg/m²     Physical Exam  Constitutional:       General: He is not in acute distress.     Appearance: Normal appearance.   HENT:      Head: Normocephalic and atraumatic.      Mouth/Throat:      Mouth: Mucous membranes are moist.      Pharynx: Oropharynx is clear. No oropharyngeal exudate or posterior oropharyngeal erythema.   Eyes:      General: No scleral icterus.     Extraocular Movements: Extraocular movements intact.      Pupils: Pupils are equal, round, and reactive to light.   Cardiovascular:      Rate and Rhythm: Normal rate and regular rhythm.      Pulses: Normal pulses.      Heart sounds: No murmur heard.     No friction rub. No gallop.   Pulmonary:      Effort: Pulmonary effort is normal.      Breath sounds: No wheezing, rhonchi or rales.   Musculoskeletal:      Right shoulder: Normal.      Comments: Examination of the left shoulder reveals tenderness over the glenohumeral joint which restricted abduction of the shoulder.  Impingement sign was positive.  Apprehension test was positive.  Neer test was positive.  [unfilled]    Skin:     General: Skin is warm.      Coloration: Skin is not jaundiced or pale.   Neurological:      General: No focal deficit present.      Mental Status: He is alert and oriented to person, place, and time.       Lab on 12/12/2023   Component Date Value Ref Range Status    WBC 12/12/2023 7.0  4.4 - 11.3 x10*3/uL Final    nRBC 12/12/2023 0.0  0.0 - 0.0 /100 WBCs Final    RBC 12/12/2023 5.20  4.50 - 5.90 x10*6/uL Final    " Hemoglobin 12/12/2023 16.1  13.5 - 17.5 g/dL Final    Hematocrit 12/12/2023 48.2  41.0 - 52.0 % Final    MCV 12/12/2023 93  80 - 100 fL Final    MCH 12/12/2023 31.0  26.0 - 34.0 pg Final    MCHC 12/12/2023 33.4  32.0 - 36.0 g/dL Final    RDW 12/12/2023 12.6  11.5 - 14.5 % Final    Platelets 12/12/2023 218  150 - 450 x10*3/uL Final    Neutrophils % 12/12/2023 57.1  40.0 - 80.0 % Final    Immature Granulocytes %, Automated 12/12/2023 1.3 (H)  0.0 - 0.9 % Final    Immature Granulocyte Count (IG) includes promyelocytes, myelocytes and metamyelocytes but does not include bands. Percent differential counts (%) should be interpreted in the context of the absolute cell counts (cells/UL).    Lymphocytes % 12/12/2023 29.1  13.0 - 44.0 % Final    Monocytes % 12/12/2023 10.3  2.0 - 10.0 % Final    Eosinophils % 12/12/2023 1.6  0.0 - 6.0 % Final    Basophils % 12/12/2023 0.6  0.0 - 2.0 % Final    Neutrophils Absolute 12/12/2023 4.02  1.60 - 5.50 x10*3/uL Final    Percent differential counts (%) should be interpreted in the context of the absolute cell counts (cells/uL).    Immature Granulocytes Absolute, Au* 12/12/2023 0.09  0.00 - 0.50 x10*3/uL Final    Lymphocytes Absolute 12/12/2023 2.04  0.80 - 3.00 x10*3/uL Final    Monocytes Absolute 12/12/2023 0.72  0.05 - 0.80 x10*3/uL Final    Eosinophils Absolute 12/12/2023 0.11  0.00 - 0.40 x10*3/uL Final    Basophils Absolute 12/12/2023 0.04  0.00 - 0.10 x10*3/uL Final    Glucose 12/12/2023 182 (H)  74 - 99 mg/dL Final    Sodium 12/12/2023 136  136 - 145 mmol/L Final    Potassium 12/12/2023 3.8  3.5 - 5.3 mmol/L Final    Chloride 12/12/2023 99  98 - 107 mmol/L Final    Bicarbonate 12/12/2023 24  21 - 32 mmol/L Final    Anion Gap 12/12/2023 17  10 - 20 mmol/L Final    Urea Nitrogen 12/12/2023 16  6 - 23 mg/dL Final    Creatinine 12/12/2023 1.03  0.50 - 1.30 mg/dL Final    eGFR 12/12/2023 75  >60 mL/min/1.73m*2 Final    Calculations of estimated GFR are performed using the 2021 CKD-EPI  Study Refit equation without the race variable for the IDMS-Traceable creatinine methods.  https://jasn.asnjournals.org/content/early/2021/09/22/ASN.6342326687    Calcium 12/12/2023 9.6  8.6 - 10.3 mg/dL Final    Albumin 12/12/2023 4.4  3.4 - 5.0 g/dL Final    Alkaline Phosphatase 12/12/2023 53  33 - 136 U/L Final    Total Protein 12/12/2023 6.8  6.4 - 8.2 g/dL Final    AST 12/12/2023 13  9 - 39 U/L Final    Bilirubin, Total 12/12/2023 0.6  0.0 - 1.2 mg/dL Final    ALT 12/12/2023 13  10 - 52 U/L Final    Patients treated with Sulfasalazine may generate falsely decreased results for ALT.    Hemoglobin A1C 12/12/2023 7.6 (H)  see below % Final    Estimated Average Glucose 12/12/2023 171  Not Established mg/dL Final    Cholesterol 12/12/2023 225 (H)  0 - 199 mg/dL Final          Age      Desirable   Borderline High   High     0-19 Y     0 - 169       170 - 199     >/= 200    20-24 Y     0 - 189       190 - 224     >/= 225         >24 Y     0 - 199       200 - 239     >/= 240   **All ranges are based on fasting samples. Specific   therapeutic targets will vary based on patient-specific   cardiac risk.    Pediatric guidelines reference:Pediatrics 2011, 128(S5).Adult guidelines reference: NCEP ATPIII Guidelines,MICHAELLE 2001, 258:2486-97    Venipuncture immediately after or during the administration of Metamizole may lead to falsely low results. Testing should be performed immediately prior to Metamizole dosing.    HDL-Cholesterol 12/12/2023 45.2  mg/dL Final      Age       Very Low   Low     Normal    High    0-19 Y    < 35      < 40     40-45     ----  20-24 Y    ----     < 40      >45      ----        >24 Y      ----     < 40     40-60      >60      Cholesterol/HDL Ratio 12/12/2023 5.0   Final      Ref Values  Desirable  < 3.4  High Risk  > 5.0    LDL Calculated 12/12/2023 115 (H)  <=99 mg/dL Final                                Near   Borderline      AGE      Desirable  Optimal    High     High     Very High     0-19 Y      0 - 109     ---    110-129   >/= 130     ----    20-24 Y     0 - 119     ---    120-159   >/= 160     ----      >24 Y     0 -  99   100-129  130-159   160-189     >/=190      VLDL 12/12/2023 65 (H)  0 - 40 mg/dL Final    Triglycerides 12/12/2023 325 (H)  0 - 149 mg/dL Final       Age         Desirable   Borderline High   High     Very High   0 D-90 D    19 - 174         ----         ----        ----  91 D- 9 Y     0 -  74        75 -  99     >/= 100      ----    10-19 Y     0 -  89        90 - 129     >/= 130      ----    20-24 Y     0 - 114       115 - 149     >/= 150      ----         >24 Y     0 - 149       150 - 199    200- 499    >/= 500    Venipuncture immediately after or during the administration of Metamizole may lead to falsely low results. Testing should be performed immediately prior to Metamizole dosing.    Non HDL Cholesterol 12/12/2023 180 (H)  0 - 149 mg/dL Final          Age       Desirable   Borderline High   High     Very High     0-19 Y     0 - 119       120 - 144     >/= 145    >/= 160    20-24 Y     0 - 149       150 - 189     >/= 190      ----         >24 Y    30 mg/dL above LDL Cholesterol goal          Assessment/Plan   Problem List Items Addressed This Visit       Benign prostatic hyperplasia with urinary obstruction     Stable, continue current medications and management.         Essential hypertension     Well-controlled, continue current medications and management.         Relevant Orders    CBC and Auto Differential    Comprehensive Metabolic Panel    Hemoglobin A1C    Lipid Panel    Follow Up In [facility] - PCP - Established    Gastroesophageal reflux disease without esophagitis     Stable, continue current medications and management.         Mixed hyperlipidemia     The nature of cardiac risk has been fully discussed with this patient. Discussed cardiovascular risk analysis and appropriate diet with the need for lifelong measures to reduce the risk. A regular exercise  program is recommended to help achieve and maintain normal body weight, fitness and improve lipid balance. Patient education provided. They understand and agree with this course of treatment. They will return with new or worsening symptoms. Patient instructed to remain current with appropriate annual health maintenance.          Relevant Orders    CBC and Auto Differential    Comprehensive Metabolic Panel    Hemoglobin A1C    Lipid Panel    Follow Up In [facility] - PCP - Established    Rotator cuff injury, left, initial encounter     Call if symptoms fail to improve as expected.    Follow-up if persistent or worsening symptoms otherwise when necessary.           Type 2 diabetes mellitus without complication, without long-term current use of insulin (CMS/[facility]) - Primary     Diabetes Mellitus type II, under poor control.  1. Rx changes:   2. Education: Reviewed ‘ABCs’ of diabetes management (respective goals in parentheses):  A1C (<7), blood pressure (<130/80), and cholesterol (LDL <100).  3. Compliance at present is estimated to be poor. Efforts to improve compliance (if necessary) will be directed at dietary modifications: and increased exercise.  4. Follow up: 3 months         Relevant Orders    CBC and Auto Differential    Comprehensive Metabolic Panel    Hemoglobin A1C    Lipid Panel    Follow Up In [facility] - PCP - Established     Lab and other ancillary tests ordered as above.  See current orders.   Lab results and medications were discussed update refills on the medications.   The risks benefits and side effects of the medications were also discussed  All questions addressed.    Scribe Attestation  By signing my name below, IJean Marie Scribe   attest that this documentation has been prepared under the direction and in the presence of Roger Wakefield MD.

## 2023-12-19 ENCOUNTER — TELEMEDICINE (OUTPATIENT)
Dept: PHARMACY | Facility: HOSPITAL | Age: 78
End: 2023-12-19
Payer: MEDICARE

## 2023-12-19 DIAGNOSIS — E11.9 TYPE 2 DIABETES MELLITUS WITHOUT COMPLICATION, WITHOUT LONG-TERM CURRENT USE OF INSULIN (MULTI): ICD-10-CM

## 2023-12-19 NOTE — PROGRESS NOTES
Pharmacist Clinic: Medication Cost     Scottie Blandon is a 78 y.o. male who presents for Diabetes.     Referring Provider: Roger Wakefield MD     Patient's wife presents to Clinical Pharmacy team per PCP to assist with medication cost of patient's Januvia. Patient receives most medications for free with the exception of Januvia and Steglatro.      Patient was approved for Januvia through [facility]. Patient will continue to receive Steglatro samples from PCP office for now.     Patient Assistance Program Approval:     We are pleased to inform you that your application for assistance has been approved.     This approval is valid through December 12, 2024  as long as the following criteria continue to be satisfied:     Your medication (Januvia) remains covered under your current insurance plan.   Your prescriber does not discontinue therapy.   You do not seek reimbursement from any other private or government-funded programs for the  medication.    Under this program, the pharmacy will first bill your insurance plan for your indemnified specified medication. The [place] Assistance Fund will then offset your copay balance, so that your out-of pocket expense for your specialty medication will be $0.00.     Will follow-up with patient in ~10 months for re-enrollment into program.    Thank you,  Cyndi Christensen, ParthaD

## 2023-12-24 DIAGNOSIS — E78.2 MIXED HYPERLIPIDEMIA: ICD-10-CM

## 2023-12-24 DIAGNOSIS — M1A.00X0 CHRONIC IDIOPATHIC GOUT: ICD-10-CM

## 2023-12-24 RX ORDER — FENOFIBRATE 160 MG/1
160 TABLET ORAL DAILY
Qty: 30 TABLET | Refills: 5 | Status: SHIPPED | OUTPATIENT
Start: 2023-12-24

## 2023-12-24 RX ORDER — ALLOPURINOL 300 MG/1
300 TABLET ORAL DAILY
Qty: 90 TABLET | Refills: 2 | Status: SHIPPED | OUTPATIENT
Start: 2023-12-24

## 2024-01-22 ENCOUNTER — HOSPITAL ENCOUNTER (OUTPATIENT)
Dept: RADIOLOGY | Facility: HOSPITAL | Age: 79
Discharge: HOME | End: 2024-01-22
Payer: MEDICARE

## 2024-01-22 DIAGNOSIS — M25.512 PAIN IN LEFT SHOULDER: ICD-10-CM

## 2024-01-22 PROCEDURE — 73030 X-RAY EXAM OF SHOULDER: CPT | Mod: LEFT SIDE | Performed by: RADIOLOGY

## 2024-01-22 PROCEDURE — 73030 X-RAY EXAM OF SHOULDER: CPT | Mod: LT

## 2024-01-23 ENCOUNTER — TELEPHONE (OUTPATIENT)
Dept: PRIMARY CARE | Facility: CLINIC | Age: 79
End: 2024-01-23
Payer: MEDICARE

## 2024-01-23 NOTE — TELEPHONE ENCOUNTER
Patient's wife Marta called in wondering if there are any samples of the ertugliflozin (Steglatro) tablets the patient can be given. She stated the patient has been getting samples of the 5 mg but is needing 15 mg  Please Advise

## 2024-02-09 ENCOUNTER — OFFICE VISIT (OUTPATIENT)
Dept: CARDIOLOGY | Facility: CLINIC | Age: 79
End: 2024-02-09
Payer: MEDICARE

## 2024-02-09 VITALS
HEIGHT: 73 IN | SYSTOLIC BLOOD PRESSURE: 110 MMHG | DIASTOLIC BLOOD PRESSURE: 70 MMHG | BODY MASS INDEX: 27.41 KG/M2 | HEART RATE: 62 BPM | WEIGHT: 206.8 LBS

## 2024-02-09 DIAGNOSIS — E11.9 TYPE 2 DIABETES MELLITUS WITHOUT COMPLICATION, WITHOUT LONG-TERM CURRENT USE OF INSULIN (MULTI): ICD-10-CM

## 2024-02-09 DIAGNOSIS — R93.1 ELEVATED CORONARY ARTERY CALCIUM SCORE: ICD-10-CM

## 2024-02-09 DIAGNOSIS — Z87.891 FORMER SMOKER: ICD-10-CM

## 2024-02-09 DIAGNOSIS — Z82.49 FAMILY HISTORY OF ISCHEMIC HEART DISEASE: ICD-10-CM

## 2024-02-09 DIAGNOSIS — I45.10 RIGHT BUNDLE BRANCH BLOCK (RBBB) ON ELECTROCARDIOGRAPHY: ICD-10-CM

## 2024-02-09 DIAGNOSIS — I10 ESSENTIAL HYPERTENSION: ICD-10-CM

## 2024-02-09 DIAGNOSIS — I25.10 CORONARY ARTERY DISEASE INVOLVING NATIVE CORONARY ARTERY OF NATIVE HEART WITHOUT ANGINA PECTORIS: ICD-10-CM

## 2024-02-09 DIAGNOSIS — E78.2 MIXED HYPERLIPIDEMIA: ICD-10-CM

## 2024-02-09 DIAGNOSIS — I44.4 LEFT ANTERIOR HEMIBLOCK: ICD-10-CM

## 2024-02-09 PROCEDURE — 1159F MED LIST DOCD IN RCRD: CPT | Performed by: INTERNAL MEDICINE

## 2024-02-09 PROCEDURE — 1036F TOBACCO NON-USER: CPT | Performed by: INTERNAL MEDICINE

## 2024-02-09 PROCEDURE — 3074F SYST BP LT 130 MM HG: CPT | Performed by: INTERNAL MEDICINE

## 2024-02-09 PROCEDURE — 3078F DIAST BP <80 MM HG: CPT | Performed by: INTERNAL MEDICINE

## 2024-02-09 PROCEDURE — 99214 OFFICE O/P EST MOD 30 MIN: CPT | Performed by: INTERNAL MEDICINE

## 2024-02-09 RX ORDER — DAPAGLIFLOZIN 10 MG/1
5 TABLET, FILM COATED ORAL DAILY
COMMUNITY
End: 2024-02-21 | Stop reason: SDUPTHER

## 2024-02-09 NOTE — PATIENT INSTRUCTIONS
Patient to follow up in 1 year with Dr. Harley Bravo MD      No changes today.   Continue same medications and treatments.   Patient educated on proper medication use.   Patient educated on risk factor modification.   Please bring any lab results from other providers / physicians to your next appointment.     Please bring all medicines, vitamins, and herbal supplements with you when you come to the office.     Prescriptions will not be filled unless you are compliant with your follow up appointments or have a follow up appointment scheduled as per instruction of your physician. Refills should be requested at the time of your visit.    I, Estuardo Beltran RN am scribing for and in the presence of Dr. Harley Weems MD

## 2024-02-09 NOTE — PROGRESS NOTES
CARDIOLOGY OFFICE VISIT      CHIEF COMPLAINT      HISTORY OF PRESENT ILLNESS  The patient states he is doing well from a cardiac standpoint.  He denies chest discomfort or symptoms of myocardial ischemia.  He denies dyspnea with exertion.  He denies palpitations and syncope.  I did go over his lipid profile with him from December of last year.  Cholesterol 225, HDL 45, , triglycerides 325.  The patient can only tolerate problem statin and he is at maximum dose of pravastatin.    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 Voice recognition software was utilized to prepare this document.     Past Medical History  Past Medical History:   Diagnosis Date    Chest pain 01/16/2023    GI bleed 03/20/2023       Social History  Social History     Tobacco Use    Smoking status: Former     Types: Cigarettes     Quit date: 2009     Years since quitting: 15.1    Smokeless tobacco: Never   Vaping Use    Vaping Use: Never used   Substance Use Topics    Alcohol use: Yes     Comment: 1-2 drinks a month    Drug use: Never       Family History     Family History   Problem Relation Name Age of Onset    Colon cancer Father          Allergies:  Allergies   Allergen Reactions    Atorvastatin Unknown    Rosuvastatin Unknown        Outpatient Medications:  Current Outpatient Medications   Medication Instructions    allopurinol (ZYLOPRIM) 300 mg, oral, Daily    aspirin 81 mg EC tablet 1 tablet, oral, Daily    blood sugar diagnostic (Blood Glucose Test) strip 3 times daily    celecoxib (CELEBREX) 200 mg, oral, 2 times daily    dapagliflozin  propanediol (FARXIGA) 5 mg, oral, Daily    ertugliflozin (STEGLATRO) 15 mg, oral, Daily    ertugliflozin (STEGLATRO) 15 mg, oral, Daily    fenofibrate (TRIGLIDE) 160 mg, oral, Daily    ferrous sulfate 324 mg (65 mg iron) EC tablet 1 tablet, oral, Daily with breakfast    glipiZIDE (GLUCOTROL) 10 mg, oral, 2 times daily before meals    Januvia 100 mg, oral, Daily    lancets 30 gauge misc miscellaneous, 3 times daily    lisinopril 2.5 mg, oral, Daily    metFORMIN (GLUCOPHAGE) 1,000 mg, oral, 2 times daily with meals    niacin 1,000 mg ER tablet 1 tablet, oral, Daily    omeprazole (PRILOSEC) 20 mg, oral, Daily    pravastatin (PRAVACHOL) 80 mg, oral, Daily    tamsulosin (FLOMAX) 0.4 mg, oral, Daily          REVIEW OF SYSTEMS  Review of Systems   All other systems reviewed and are negative.        VITALS  Vitals:    02/09/24 1007   BP: 110/70   Pulse: 62       PHYSICAL EXAM  Vitals reviewed.   Constitutional:       Appearance: Normal and healthy appearance. Well-developed and not in distress.   Eyes:      Conjunctiva/sclera: Conjunctivae normal.      Pupils: Pupils are equal, round, and reactive to light.   Neck:      Vascular: No JVR. JVD normal.   Pulmonary:      Effort: Pulmonary effort is normal.      Breath sounds: Normal breath sounds. No wheezing. No rhonchi. No rales.   Chest:      Chest wall: Not tender to palpatation.   Cardiovascular:      PMI at left midclavicular line. Normal rate. Regular rhythm. Normal S1. Normal S2.       Murmurs: There is no murmur.      No gallop.  No click. No rub.   Pulses:     Intact distal pulses.   Edema:     Peripheral edema absent.   Abdominal:      Tenderness: There is no abdominal tenderness.   Musculoskeletal: Normal range of motion.         General: No tenderness.      Cervical back: Normal range of motion. Skin:     General: Skin is warm and dry.   Neurological:      General: No focal deficit present.      Mental Status: Alert and oriented to person, place and time.    Psychiatric:         Behavior: Behavior is cooperative.           ASSESSMENT AND PLAN  Diagnoses and all orders for this visit:  Coronary artery disease involving native coronary artery of native heart without angina pectoris  Elevated coronary artery calcium score  Essential hypertension  Mixed hyperlipidemia  Type 2 diabetes mellitus without complication, without long-term current use of insulin (CMS/[facility])  Family history of ischemic heart disease  Right bundle branch block (RBBB) on electrocardiography  Left anterior hemiblock  BMI 27.0-27.9,adult  Former smoker      [unfilled]

## 2024-02-21 ENCOUNTER — TELEMEDICINE (OUTPATIENT)
Dept: PHARMACY | Facility: HOSPITAL | Age: 79
End: 2024-02-21
Payer: MEDICARE

## 2024-02-21 DIAGNOSIS — E11.9 TYPE 2 DIABETES MELLITUS WITHOUT COMPLICATION, WITHOUT LONG-TERM CURRENT USE OF INSULIN (MULTI): ICD-10-CM

## 2024-02-21 DIAGNOSIS — E11.9 TYPE 2 DIABETES MELLITUS WITHOUT COMPLICATION, WITHOUT LONG-TERM CURRENT USE OF INSULIN (MULTI): Primary | ICD-10-CM

## 2024-02-21 PROCEDURE — RXMED WILLOW AMBULATORY MEDICATION CHARGE

## 2024-02-21 RX ORDER — DAPAGLIFLOZIN 10 MG/1
5 TABLET, FILM COATED ORAL DAILY
Qty: 30 TABLET | Refills: 1 | Status: SHIPPED | OUTPATIENT
Start: 2024-02-21 | End: 2024-05-01 | Stop reason: DRUGHIGH

## 2024-02-21 NOTE — PROGRESS NOTES
Pharmacist Clinic: Medication Cost     Scottie Blandon is a 78 y.o. male who presents for Diabetes.     Referring Provider: Roger Wakefield MD     Patient's wife, Marta, presents to Clinical Pharmacy team per PCP to assist with medication cost of patient's Farxiga. Patient was previously on Steglatro but per Marta, PCP switched patient to Farxiga at her last follow-up visit. Patient current has about ~2 weeks worth of samples from office.     [facility]. Will send new script for Farxiga 5 mg to [facility] pharmacy and add onto patient's current benefit so patient can receive Farxiga at no cost through .     Thank you,  Cyndi Christensen, PharmD  Clinical Pharmacist

## 2024-03-01 ENCOUNTER — PHARMACY VISIT (OUTPATIENT)
Dept: PHARMACY | Facility: CLINIC | Age: 79
End: 2024-03-01
Payer: MEDICARE

## 2024-03-05 ENCOUNTER — HOSPITAL ENCOUNTER (OUTPATIENT)
Dept: RADIOLOGY | Facility: HOSPITAL | Age: 79
Discharge: HOME | End: 2024-03-05
Payer: MEDICARE

## 2024-03-05 ENCOUNTER — LAB (OUTPATIENT)
Dept: LAB | Facility: LAB | Age: 79
End: 2024-03-05
Payer: MEDICARE

## 2024-03-05 DIAGNOSIS — E11.9 TYPE 2 DIABETES MELLITUS WITHOUT COMPLICATION, WITHOUT LONG-TERM CURRENT USE OF INSULIN (MULTI): ICD-10-CM

## 2024-03-05 DIAGNOSIS — I10 ESSENTIAL HYPERTENSION: ICD-10-CM

## 2024-03-05 DIAGNOSIS — M75.102 UNSPECIFIED ROTATOR CUFF TEAR OR RUPTURE OF LEFT SHOULDER, NOT SPECIFIED AS TRAUMATIC: ICD-10-CM

## 2024-03-05 DIAGNOSIS — E78.2 MIXED HYPERLIPIDEMIA: ICD-10-CM

## 2024-03-05 LAB
ALBUMIN SERPL BCP-MCNC: 4.2 G/DL (ref 3.4–5)
ALP SERPL-CCNC: 52 U/L (ref 33–136)
ALT SERPL W P-5'-P-CCNC: 14 U/L (ref 10–52)
ANION GAP SERPL CALC-SCNC: 14 MMOL/L (ref 10–20)
AST SERPL W P-5'-P-CCNC: 12 U/L (ref 9–39)
BASOPHILS # BLD AUTO: 0.05 X10*3/UL (ref 0–0.1)
BASOPHILS NFR BLD AUTO: 0.8 %
BILIRUB SERPL-MCNC: 0.4 MG/DL (ref 0–1.2)
BUN SERPL-MCNC: 14 MG/DL (ref 6–23)
CALCIUM SERPL-MCNC: 9.8 MG/DL (ref 8.6–10.3)
CHLORIDE SERPL-SCNC: 105 MMOL/L (ref 98–107)
CHOLEST SERPL-MCNC: 214 MG/DL (ref 0–199)
CHOLESTEROL/HDL RATIO: 4.4
CO2 SERPL-SCNC: 26 MMOL/L (ref 21–32)
CREAT SERPL-MCNC: 0.86 MG/DL (ref 0.5–1.3)
EGFRCR SERPLBLD CKD-EPI 2021: 90 ML/MIN/1.73M*2
EOSINOPHIL # BLD AUTO: 0.11 X10*3/UL (ref 0–0.4)
EOSINOPHIL NFR BLD AUTO: 1.8 %
ERYTHROCYTE [DISTWIDTH] IN BLOOD BY AUTOMATED COUNT: 12.7 % (ref 11.5–14.5)
EST. AVERAGE GLUCOSE BLD GHB EST-MCNC: 151 MG/DL
GLUCOSE SERPL-MCNC: 183 MG/DL (ref 74–99)
HBA1C MFR BLD: 6.9 %
HCT VFR BLD AUTO: 43.2 % (ref 41–52)
HDLC SERPL-MCNC: 48.9 MG/DL
HGB BLD-MCNC: 14.5 G/DL (ref 13.5–17.5)
IMM GRANULOCYTES # BLD AUTO: 0.09 X10*3/UL (ref 0–0.5)
IMM GRANULOCYTES NFR BLD AUTO: 1.5 % (ref 0–0.9)
LDLC SERPL CALC-MCNC: 117 MG/DL
LYMPHOCYTES # BLD AUTO: 1.76 X10*3/UL (ref 0.8–3)
LYMPHOCYTES NFR BLD AUTO: 29.4 %
MCH RBC QN AUTO: 31.5 PG (ref 26–34)
MCHC RBC AUTO-ENTMCNC: 33.6 G/DL (ref 32–36)
MCV RBC AUTO: 94 FL (ref 80–100)
MONOCYTES # BLD AUTO: 0.57 X10*3/UL (ref 0.05–0.8)
MONOCYTES NFR BLD AUTO: 9.5 %
NEUTROPHILS # BLD AUTO: 3.4 X10*3/UL (ref 1.6–5.5)
NEUTROPHILS NFR BLD AUTO: 57 %
NON HDL CHOLESTEROL: 165 MG/DL (ref 0–149)
NRBC BLD-RTO: 0 /100 WBCS (ref 0–0)
PLATELET # BLD AUTO: 202 X10*3/UL (ref 150–450)
POTASSIUM SERPL-SCNC: 4.1 MMOL/L (ref 3.5–5.3)
PROT SERPL-MCNC: 6.5 G/DL (ref 6.4–8.2)
RBC # BLD AUTO: 4.61 X10*6/UL (ref 4.5–5.9)
SODIUM SERPL-SCNC: 141 MMOL/L (ref 136–145)
TRIGL SERPL-MCNC: 243 MG/DL (ref 0–149)
VLDL: 49 MG/DL (ref 0–40)
WBC # BLD AUTO: 6 X10*3/UL (ref 4.4–11.3)

## 2024-03-05 PROCEDURE — 83036 HEMOGLOBIN GLYCOSYLATED A1C: CPT

## 2024-03-05 PROCEDURE — 80053 COMPREHEN METABOLIC PANEL: CPT

## 2024-03-05 PROCEDURE — 73221 MRI JOINT UPR EXTREM W/O DYE: CPT | Mod: LT

## 2024-03-05 PROCEDURE — 85025 COMPLETE CBC W/AUTO DIFF WBC: CPT

## 2024-03-05 PROCEDURE — 80061 LIPID PANEL: CPT

## 2024-03-05 PROCEDURE — 36415 COLL VENOUS BLD VENIPUNCTURE: CPT

## 2024-03-05 PROCEDURE — 73221 MRI JOINT UPR EXTREM W/O DYE: CPT | Mod: LEFT SIDE | Performed by: RADIOLOGY

## 2024-03-06 PROCEDURE — RXMED WILLOW AMBULATORY MEDICATION CHARGE

## 2024-03-08 ENCOUNTER — PHARMACY VISIT (OUTPATIENT)
Dept: PHARMACY | Facility: CLINIC | Age: 79
End: 2024-03-08
Payer: MEDICARE

## 2024-03-14 ENCOUNTER — OFFICE VISIT (OUTPATIENT)
Dept: ORTHOPEDIC SURGERY | Facility: CLINIC | Age: 79
End: 2024-03-14
Payer: MEDICARE

## 2024-03-14 ENCOUNTER — APPOINTMENT (OUTPATIENT)
Dept: PRIMARY CARE | Facility: CLINIC | Age: 79
End: 2024-03-14
Payer: MEDICARE

## 2024-03-14 DIAGNOSIS — M12.812 ROTATOR CUFF ARTHROPATHY, LEFT: Primary | ICD-10-CM

## 2024-03-14 PROCEDURE — 99213 OFFICE O/P EST LOW 20 MIN: CPT | Performed by: ORTHOPAEDIC SURGERY

## 2024-03-14 PROCEDURE — 1159F MED LIST DOCD IN RCRD: CPT | Performed by: ORTHOPAEDIC SURGERY

## 2024-03-14 PROCEDURE — 99203 OFFICE O/P NEW LOW 30 MIN: CPT | Performed by: ORTHOPAEDIC SURGERY

## 2024-03-14 PROCEDURE — 1036F TOBACCO NON-USER: CPT | Performed by: ORTHOPAEDIC SURGERY

## 2024-03-14 NOTE — PROGRESS NOTES
History of present: Patient here left shoulder pain he started having significant dysfunction over the last 5 to 6 months he got to the point where he was developing pseudoparalysis unable to lift the arm severe pain imaging did not look too bad just mild arthritic change AC joint glenohumeral joint did not look too bad a little bit of arthritis    He got an MRI unfortunately that showed elevation of humeral head the mild arthritis is pointed out on plain x-ray but pretty much complete full-thickness loss of subscap supraspinatus and into the infraspinatus with muscle atrophy and superimposed edema and soft tissue wasting making him not unreconstructable from a rotator cuff standpoint    He is now gone on to develop some anterior escape due to the subscap difficulty and deficiency he had a cortisone shot 2 months ago without relief all aspects of ADLs are now affected        Past medical history:    The patient's past medical history, family history, social history and review of systems were documented on the patient's medical intake form.  The medical intake form was reviewed and scanned into the electronic medical record for future use.  History is otherwise negative except as stated in the history of present illness.    Physical exam:    General: Alert and oriented to person place and time.  No acute distress and breathing comfortably, pleasant and cooperative with examination.    Head and neck exam: Head is normocephalic atraumatic.    Neck: Supple no visible swelling or deformity.    Cardiovascular: Good perfusion to affected extremities without signs or symptoms of chest pain.    Lungs no audible wheezing or labored breathing on examination.    Abdominal exam: Nondistended nontender    Extremities: The left shoulder was inspected and was found to have no obvious deformity.  There was tenderness to touch over the lateral edges of the shoulder over the rotator cuff insertion.  Active forward flexion 60 degrees,  Pt returned to ED Room 2 from Radiology with parents. external rotation to 20 degrees, abduction to 25 degrees, and internal rotation to the level of L2.    At this time the shoulder is neurovascular tact and neurosensory intact.  Motor intact C5-T1.  There was no obvious neck pain or radiculopathy noted.  There was no gross instability or adhesive capsulitis symptoms.  There was no evidence of apprehension or apprehension suppression.    Strength was tested in 4 planes with weakness in the supraspinatus strength testing and external rotation position.  There was no strength deficit in internal rotation.  Impingement signs were positive both supine and standing for impingement test type I and II.  There was mild pain over the bicipital groove with a positive speeds sign        Diagnostic studies: X-rays again are reviewed MRIs reviewed showing significant cuff atrophy and wasting of subscap supraspinatus infraspinatus not unreconstructable some mild arthritic change left glenohumeral joint      Impression: Early rotator cuff arthropathy but now with significant clinical dysfunction despite PT rest and cortisone injection    He has developed anterior escape and almost pseudoparalysis having to assist to use the arm for all aspects of ADLs      Plan: Risk and benefits alternatives to surgery discussed he is not unreconstructable candidate he would be a shoulder replacement reverse type    He thought about his options he had the cortisone shot it did not provide much relief he is interested in regaining function of the arm    Risk and benefits of surgery discussed extensively with the patient.    Surgical risk included but were not limited to infection, wear, loosening, need for further surgery blood clot, failure to heal, failure of the surgery, stiffness, loss of limb life, extremity function change in length change, and associated risks of surgery during the coronavirus epidemic.    We went over the unique rehab potential goals risks and benefits of reverse shoulder  replacement he will be on the operative schedule

## 2024-03-23 DIAGNOSIS — E78.2 MIXED HYPERLIPIDEMIA: ICD-10-CM

## 2024-03-23 DIAGNOSIS — E11.9 TYPE 2 DIABETES MELLITUS WITHOUT COMPLICATION, WITHOUT LONG-TERM CURRENT USE OF INSULIN (MULTI): ICD-10-CM

## 2024-03-24 RX ORDER — METFORMIN HYDROCHLORIDE 500 MG/1
1000 TABLET ORAL
Qty: 360 TABLET | Refills: 1 | Status: SHIPPED | OUTPATIENT
Start: 2024-03-24

## 2024-03-24 RX ORDER — PRAVASTATIN SODIUM 80 MG/1
80 TABLET ORAL DAILY
Qty: 90 TABLET | Refills: 1 | Status: SHIPPED | OUTPATIENT
Start: 2024-03-24

## 2024-03-28 ENCOUNTER — OFFICE VISIT (OUTPATIENT)
Dept: PRIMARY CARE | Facility: CLINIC | Age: 79
End: 2024-03-28
Payer: MEDICARE

## 2024-03-28 VITALS
HEIGHT: 73 IN | BODY MASS INDEX: 28.36 KG/M2 | SYSTOLIC BLOOD PRESSURE: 118 MMHG | TEMPERATURE: 97.2 F | DIASTOLIC BLOOD PRESSURE: 64 MMHG | OXYGEN SATURATION: 96 % | RESPIRATION RATE: 20 BRPM | HEART RATE: 82 BPM | WEIGHT: 214 LBS

## 2024-03-28 DIAGNOSIS — E78.2 MIXED HYPERLIPIDEMIA: ICD-10-CM

## 2024-03-28 DIAGNOSIS — Z00.00 ROUTINE GENERAL MEDICAL EXAMINATION AT HEALTH CARE FACILITY: Primary | ICD-10-CM

## 2024-03-28 DIAGNOSIS — N13.8 BENIGN PROSTATIC HYPERPLASIA WITH URINARY OBSTRUCTION: ICD-10-CM

## 2024-03-28 DIAGNOSIS — N40.1 BENIGN PROSTATIC HYPERPLASIA WITH URINARY OBSTRUCTION: ICD-10-CM

## 2024-03-28 DIAGNOSIS — K21.9 GASTROESOPHAGEAL REFLUX DISEASE WITHOUT ESOPHAGITIS: ICD-10-CM

## 2024-03-28 DIAGNOSIS — I10 ESSENTIAL HYPERTENSION: ICD-10-CM

## 2024-03-28 DIAGNOSIS — I71.40 ABDOMINAL AORTIC ANEURYSM (AAA) WITHOUT RUPTURE, UNSPECIFIED PART (CMS-HCC): ICD-10-CM

## 2024-03-28 DIAGNOSIS — E11.9 TYPE 2 DIABETES MELLITUS WITHOUT COMPLICATION, WITHOUT LONG-TERM CURRENT USE OF INSULIN (MULTI): ICD-10-CM

## 2024-03-28 PROCEDURE — 1123F ACP DISCUSS/DSCN MKR DOCD: CPT | Performed by: FAMILY MEDICINE

## 2024-03-28 PROCEDURE — 99214 OFFICE O/P EST MOD 30 MIN: CPT | Performed by: FAMILY MEDICINE

## 2024-03-28 PROCEDURE — 1160F RVW MEDS BY RX/DR IN RCRD: CPT | Performed by: FAMILY MEDICINE

## 2024-03-28 PROCEDURE — G0439 PPPS, SUBSEQ VISIT: HCPCS | Performed by: FAMILY MEDICINE

## 2024-03-28 PROCEDURE — 1159F MED LIST DOCD IN RCRD: CPT | Performed by: FAMILY MEDICINE

## 2024-03-28 PROCEDURE — 3078F DIAST BP <80 MM HG: CPT | Performed by: FAMILY MEDICINE

## 2024-03-28 PROCEDURE — 1158F ADVNC CARE PLAN TLK DOCD: CPT | Performed by: FAMILY MEDICINE

## 2024-03-28 PROCEDURE — 3074F SYST BP LT 130 MM HG: CPT | Performed by: FAMILY MEDICINE

## 2024-03-28 PROCEDURE — 1170F FXNL STATUS ASSESSED: CPT | Performed by: FAMILY MEDICINE

## 2024-03-28 PROCEDURE — 1036F TOBACCO NON-USER: CPT | Performed by: FAMILY MEDICINE

## 2024-03-28 RX ORDER — TAMSULOSIN HYDROCHLORIDE 0.4 MG/1
0.4 CAPSULE ORAL DAILY
Qty: 90 CAPSULE | Refills: 1 | Status: SHIPPED | OUTPATIENT
Start: 2024-03-28

## 2024-03-28 RX ORDER — GLIPIZIDE 10 MG/1
10 TABLET ORAL
Qty: 180 TABLET | Refills: 1 | Status: SHIPPED | OUTPATIENT
Start: 2024-03-28

## 2024-03-28 RX ORDER — LISINOPRIL 2.5 MG/1
2.5 TABLET ORAL DAILY
Qty: 90 TABLET | Refills: 1 | Status: SHIPPED | OUTPATIENT
Start: 2024-03-28

## 2024-03-28 RX ORDER — OMEPRAZOLE 20 MG/1
20 CAPSULE, DELAYED RELEASE ORAL DAILY
Qty: 90 CAPSULE | Refills: 1 | Status: SHIPPED | OUTPATIENT
Start: 2024-03-28

## 2024-03-28 ASSESSMENT — ENCOUNTER SYMPTOMS
VOMITING: 0
NUMBNESS: 0
FATIGUE: 0
BLURRED VISION: 0
COUGH: 0
WEAKNESS: 0
PALPITATIONS: 0
CONSTIPATION: 0
DIZZINESS: 0
HEADACHES: 0
WHEEZING: 0
POLYDIPSIA: 0
RHINORRHEA: 0
WEIGHT LOSS: 0
POLYPHAGIA: 0
CHILLS: 0
VISUAL CHANGE: 0
FREQUENCY: 0
TREMORS: 0
SHORTNESS OF BREATH: 0
FEVER: 0
SORE THROAT: 0
DIARRHEA: 0
ABDOMINAL PAIN: 0
HEMATURIA: 0
DYSURIA: 0

## 2024-03-28 ASSESSMENT — ACTIVITIES OF DAILY LIVING (ADL)
BATHING: INDEPENDENT
GROCERY_SHOPPING: INDEPENDENT
MANAGING_FINANCES: INDEPENDENT
DRESSING: INDEPENDENT
TAKING_MEDICATION: INDEPENDENT
DOING_HOUSEWORK: INDEPENDENT

## 2024-03-28 ASSESSMENT — PATIENT HEALTH QUESTIONNAIRE - PHQ9
1. LITTLE INTEREST OR PLEASURE IN DOING THINGS: NOT AT ALL
SUM OF ALL RESPONSES TO PHQ9 QUESTIONS 1 AND 2: 0
2. FEELING DOWN, DEPRESSED OR HOPELESS: NOT AT ALL

## 2024-03-28 NOTE — PROGRESS NOTES
Subjective   Patient ID: Scottie Blandon is a 78 y.o. male who presents for Medicare Annual Wellness Visit Subsequent and Follow-up (Diabetes, Hypertension, Hyperlipidemia and labs).    He presents for Annual Medicare Wellness Visit and follow-up on hypertension and hyperlipidemia. He has no chest pain, dyspnea, exertional chest pressure/discomfort, near-syncope, orthopnea, palpitations, paroxysmal nocturnal dyspnea, and syncope. Taking his medication regularly with no side effects.    Diabetes  He presents for his follow-up diabetic visit. He has type 2 diabetes mellitus. His disease course has been improving. There are no hypoglycemic associated symptoms. Pertinent negatives for hypoglycemia include no dizziness, headaches or tremors. Pertinent negatives for diabetes include no blurred vision, no chest pain, no fatigue, no foot paresthesias, no foot ulcerations, no polydipsia, no polyphagia, no polyuria, no visual change, no weakness and no weight loss. Risk factors for coronary artery disease include diabetes mellitus, dyslipidemia, hypertension and male sex.      Past Medical, Surgical, and Family History reviewed and updated in chart.    Reviewed all medications by prescribing practitioner or clinical pharmacist (such as prescriptions, OTCs, herbal therapies and supplements) and documented in the medical record.    Patient Self Assessment of Health Status  Patient Self Assessment: Fair    Nutrition and Exercise  Current Diet: Well Balanced Diet  Adequate Fluid Intake: Yes  Caffeine: Yes  Exercise Frequency: Infrequently    Functional Ability/Level of Safety  Cognitive Impairment Observed: No cognitive impairment observed  Cognitive Impairment Reported: No cognitive impairment reported by patient or family    Home Safety Risk Factors: None    Review of Systems   Constitutional:  Negative for chills, fatigue, fever and weight loss.   HENT:  Negative for congestion, ear pain, nosebleeds, rhinorrhea and sore throat.   "  Eyes:  Negative for blurred vision.   Respiratory:  Negative for cough, shortness of breath and wheezing.    Cardiovascular:  Negative for chest pain, palpitations and leg swelling.   Gastrointestinal:  Negative for abdominal pain, constipation, diarrhea and vomiting.   Endocrine: Negative for polydipsia, polyphagia and polyuria.   Genitourinary:  Negative for dysuria, frequency and hematuria.   Neurological:  Negative for dizziness, tremors, weakness, numbness and headaches.       Objective   /64 (BP Location: Right arm, Patient Position: Sitting)   Pulse 82   Temp 36.2 °C (97.2 °F)   Resp 20   Ht 1.842 m (6' 0.5\")   Wt 97.1 kg (214 lb)   SpO2 96%   BMI 28.62 kg/m²     Physical Exam  Constitutional:       General: He is not in acute distress.     Appearance: Normal appearance.   HENT:      Head: Normocephalic and atraumatic.      Mouth/Throat:      Mouth: Mucous membranes are moist.      Pharynx: Oropharynx is clear. No oropharyngeal exudate or posterior oropharyngeal erythema.   Eyes:      General: No scleral icterus.     Extraocular Movements: Extraocular movements intact.      Pupils: Pupils are equal, round, and reactive to light.   Cardiovascular:      Rate and Rhythm: Normal rate and regular rhythm.      Pulses: Normal pulses.      Heart sounds: No murmur heard.     No friction rub. No gallop.   Pulmonary:      Effort: Pulmonary effort is normal.      Breath sounds: No wheezing, rhonchi or rales.   Skin:     General: Skin is warm.      Coloration: Skin is not jaundiced or pale.      Findings: No erythema or rash.   Neurological:      General: No focal deficit present.      Mental Status: He is alert and oriented to person, place, and time.      Cranial Nerves: No cranial nerve deficit.      Sensory: No sensory deficit.      Coordination: Coordination normal.      Gait: Gait normal.         Lab on 03/05/2024   Component Date Value Ref Range Status    WBC 03/05/2024 6.0  4.4 - 11.3 x10*3/uL Final "    nRBC 03/05/2024 0.0  0.0 - 0.0 /100 WBCs Final    RBC 03/05/2024 4.61  4.50 - 5.90 x10*6/uL Final    Hemoglobin 03/05/2024 14.5  13.5 - 17.5 g/dL Final    Hematocrit 03/05/2024 43.2  41.0 - 52.0 % Final    MCV 03/05/2024 94  80 - 100 fL Final    MCH 03/05/2024 31.5  26.0 - 34.0 pg Final    MCHC 03/05/2024 33.6  32.0 - 36.0 g/dL Final    RDW 03/05/2024 12.7  11.5 - 14.5 % Final    Platelets 03/05/2024 202  150 - 450 x10*3/uL Final    Neutrophils % 03/05/2024 57.0  40.0 - 80.0 % Final    Immature Granulocytes %, Automated 03/05/2024 1.5 (H)  0.0 - 0.9 % Final    Immature Granulocyte Count (IG) includes promyelocytes, myelocytes and metamyelocytes but does not include bands. Percent differential counts (%) should be interpreted in the context of the absolute cell counts (cells/UL).    Lymphocytes % 03/05/2024 29.4  13.0 - 44.0 % Final    Monocytes % 03/05/2024 9.5  2.0 - 10.0 % Final    Eosinophils % 03/05/2024 1.8  0.0 - 6.0 % Final    Basophils % 03/05/2024 0.8  0.0 - 2.0 % Final    Neutrophils Absolute 03/05/2024 3.40  1.60 - 5.50 x10*3/uL Final    Percent differential counts (%) should be interpreted in the context of the absolute cell counts (cells/uL).    Immature Granulocytes Absolute, Au* 03/05/2024 0.09  0.00 - 0.50 x10*3/uL Final    Lymphocytes Absolute 03/05/2024 1.76  0.80 - 3.00 x10*3/uL Final    Monocytes Absolute 03/05/2024 0.57  0.05 - 0.80 x10*3/uL Final    Eosinophils Absolute 03/05/2024 0.11  0.00 - 0.40 x10*3/uL Final    Basophils Absolute 03/05/2024 0.05  0.00 - 0.10 x10*3/uL Final    Glucose 03/05/2024 183 (H)  74 - 99 mg/dL Final    Sodium 03/05/2024 141  136 - 145 mmol/L Final    Potassium 03/05/2024 4.1  3.5 - 5.3 mmol/L Final    Chloride 03/05/2024 105  98 - 107 mmol/L Final    Bicarbonate 03/05/2024 26  21 - 32 mmol/L Final    Anion Gap 03/05/2024 14  10 - 20 mmol/L Final    Urea Nitrogen 03/05/2024 14  6 - 23 mg/dL Final    Creatinine 03/05/2024 0.86  0.50 - 1.30 mg/dL Final    eGFR  03/05/2024 90  >60 mL/min/1.73m*2 Final    Calculations of estimated GFR are performed using the 2021 CKD-EPI Study Refit equation without the race variable for the IDMS-Traceable creatinine methods.  https://jasn.asnjournals.org/content/early/2021/09/22/ASN.3929157026    Calcium 03/05/2024 9.8  8.6 - 10.3 mg/dL Final    Albumin 03/05/2024 4.2  3.4 - 5.0 g/dL Final    Alkaline Phosphatase 03/05/2024 52  33 - 136 U/L Final    Total Protein 03/05/2024 6.5  6.4 - 8.2 g/dL Final    AST 03/05/2024 12  9 - 39 U/L Final    Bilirubin, Total 03/05/2024 0.4  0.0 - 1.2 mg/dL Final    ALT 03/05/2024 14  10 - 52 U/L Final    Patients treated with Sulfasalazine may generate falsely decreased results for ALT.    Hemoglobin A1C 03/05/2024 6.9 (H)  see below % Final    Estimated Average Glucose 03/05/2024 151  Not Established mg/dL Final    Cholesterol 03/05/2024 214 (H)  0 - 199 mg/dL Final          Age      Desirable   Borderline High   High     0-19 Y     0 - 169       170 - 199     >/= 200    20-24 Y     0 - 189       190 - 224     >/= 225         >24 Y     0 - 199       200 - 239     >/= 240   **All ranges are based on fasting samples. Specific   therapeutic targets will vary based on patient-specific   cardiac risk.    Pediatric guidelines reference:Pediatrics 2011, 128(S5).Adult guidelines reference: NCEP ATPIII Guidelines,MICHAELLE 2001, 258:2486-97    Venipuncture immediately after or during the administration of Metamizole may lead to falsely low results. Testing should be performed immediately prior to Metamizole dosing.    HDL-Cholesterol 03/05/2024 48.9  mg/dL Final      Age       Very Low   Low     Normal    High    0-19 Y    < 35      < 40     40-45     ----  20-24 Y    ----     < 40      >45      ----        >24 Y      ----     < 40     40-60      >60      Cholesterol/HDL Ratio 03/05/2024 4.4   Final      Ref Values  Desirable  < 3.4  High Risk  > 5.0    LDL Calculated 03/05/2024 117 (H)  <=99 mg/dL Final                                 Near   Borderline      AGE      Desirable  Optimal    High     High     Very High     0-19 Y     0 - 109     ---    110-129   >/= 130     ----    20-24 Y     0 - 119     ---    120-159   >/= 160     ----      >24 Y     0 -  99   100-129  130-159   160-189     >/=190      VLDL 03/05/2024 49 (H)  0 - 40 mg/dL Final    Triglycerides 03/05/2024 243 (H)  0 - 149 mg/dL Final       Age         Desirable   Borderline High   High     Very High   0 D-90 D    19 - 174         ----         ----        ----  91 D- 9 Y     0 -  74        75 -  99     >/= 100      ----    10-19 Y     0 -  89        90 - 129     >/= 130      ----    20-24 Y     0 - 114       115 - 149     >/= 150      ----         >24 Y     0 - 149       150 - 199    200- 499    >/= 500    Venipuncture immediately after or during the administration of Metamizole may lead to falsely low results. Testing should be performed immediately prior to Metamizole dosing.    Non HDL Cholesterol 03/05/2024 165 (H)  0 - 149 mg/dL Final          Age       Desirable   Borderline High   High     Very High     0-19 Y     0 - 119       120 - 144     >/= 145    >/= 160    20-24 Y     0 - 149       150 - 189     >/= 190      ----         >24 Y    30 mg/dL above LDL Cholesterol goal       Assessment/Plan   Problem List Items Addressed This Visit       Abdominal aortic aneurysm, without rupture, unspecified (CMS/HCC)     Chronic Condition Documentation : Stable based on symptoms and exam.  Continue established treatment plan and follow-up at least yearly.         Benign prostatic hyperplasia with urinary obstruction     Well-controlled, continue current medications and management.         Relevant Medications    tamsulosin (Flomax) 0.4 mg 24 hr capsule    Essential hypertension     Well-controlled, continue current medications and management.         Relevant Medications    lisinopril 2.5 mg tablet    Other Relevant Orders    CBC and Auto Differential    Comprehensive  Metabolic Panel    Hemoglobin A1C    Lipid Panel    Follow Up In [facility] - PCP - Established    Gastroesophageal reflux disease without esophagitis     Well-controlled, continue current medications and management.         Relevant Medications    omeprazole (PriLOSEC) 20 mg DR capsule    Mixed hyperlipidemia     The nature of cardiac risk has been fully discussed with this patient. Discussed cardiovascular risk analysis and appropriate diet with the need for lifelong measures to reduce the risk. A regular exercise program is recommended to help achieve and maintain normal body weight, fitness and improve lipid balance. Patient education provided. They understand and agree with this course of treatment. They will return with new or worsening symptoms. Patient instructed to remain current with appropriate annual health maintenance.          Relevant Orders    CBC and Auto Differential    Comprehensive Metabolic Panel    Hemoglobin A1C    Lipid Panel    Follow Up In [facility] - PCP - Established    Routine general medical examination at health care facility - Primary     Recommend low-cholesterol diet, low-fat diet and low-salt diet.  The need for lifelong dietary compliance in order to reduce cardiac risk is recommended.  We will also recommend regular exercise program to improve lipid balance and overall health.  Recommend decreasing fat and cholesterol in diet, increasing aerobic exercise with a goal of 4 or more days per week         Type 2 diabetes mellitus without complication, without long-term current use of insulin (CMS/[facility])     Diabetes Mellitus type II, under good control.  1. Rx changes: None  2. Education: Reviewed ‘ABCs’ of diabetes management (respective goals in parentheses):  A1C (<7), blood pressure (<130/80), and cholesterol (LDL <100).  3. Compliance at present is estimated to be good. Efforts to improve compliance (if necessary) will be directed at dietary modifications:  and increased exercise.  4. Follow up: 4 months         Relevant Medications    glipiZIDE (Glucotrol) 10 mg tablet    Other Relevant Orders    CBC and Auto Differential    Comprehensive Metabolic Panel    Hemoglobin A1C    Lipid Panel    Follow Up In [facility] - PCP - Established    Follow Up In [facility] - PCP - Established     Lab and other ancillary tests ordered as above.  See current orders.   Lab results and medications were discussed update refills on the medications.   The risks benefits and side effects of the medications were also discussed  All questions addressed.    Scribe Attestation  By signing my name below, IJean Marie Scribe   attest that this documentation has been prepared under the direction and in the presence of Roger Wakefield MD.    Patient was identified as a fall risk. Risk prevention instructions provided.

## 2024-03-28 NOTE — ASSESSMENT & PLAN NOTE
Diabetes Mellitus type II, under good control.  1. Rx changes: None  2. Education: Reviewed ‘ABCs’ of diabetes management (respective goals in parentheses):  A1C (<7), blood pressure (<130/80), and cholesterol (LDL <100).  3. Compliance at present is estimated to be good. Efforts to improve compliance (if necessary) will be directed at dietary modifications: and increased exercise.  4. Follow up: 4 months

## 2024-03-28 NOTE — PATIENT INSTRUCTIONS

## 2024-03-29 ENCOUNTER — TELEPHONE (OUTPATIENT)
Dept: PRIMARY CARE | Facility: CLINIC | Age: 79
End: 2024-03-29
Payer: MEDICARE

## 2024-03-29 NOTE — TELEPHONE ENCOUNTER
Per Dr. Wakefield inform that we can not remove the diagnosis as a very small AAA was found on a CT scan when the patient went to the ER for diarrhea in November of 2022. The AAA is very small but we can order an US of abdominal aorta if they would like to follow up.    Patients wife made aware and refused ultrasound at this time

## 2024-03-29 NOTE — TELEPHONE ENCOUNTER
"Scottie's wife, Marta, called in and is questioning his chart/appointment notes, that states he has \"Abdominal aortic aneurysm (AAA) without rupture, unspecified part (CMS/HCC)\". She said he does not have an aneurysm and wants this removed from his chat because he is suppose to have surgery in May and if concerned they will not do the surgery if the other doctors think he has an aneurysm. Please advise.  "

## 2024-05-01 PROCEDURE — RXMED WILLOW AMBULATORY MEDICATION CHARGE

## 2024-05-01 RX ORDER — DAPAGLIFLOZIN 5 MG/1
5 TABLET, FILM COATED ORAL DAILY
Qty: 30 TABLET | Refills: 2 | Status: SHIPPED | OUTPATIENT
Start: 2024-05-01 | End: 2025-06-05

## 2024-05-02 ENCOUNTER — PHARMACY VISIT (OUTPATIENT)
Dept: PHARMACY | Facility: CLINIC | Age: 79
End: 2024-05-02
Payer: MEDICARE

## 2024-05-06 PROBLEM — M19.012 DEGENERATIVE JOINT DISEASE OF LEFT SHOULDER: Status: ACTIVE | Noted: 2024-05-28

## 2024-05-06 PROBLEM — M75.122 COMPLETE ROTATOR CUFF TEAR OR RUPTURE OF LEFT SHOULDER, NOT SPECIFIED AS TRAUMATIC: Status: ACTIVE | Noted: 2024-05-28

## 2024-05-07 ENCOUNTER — OFFICE VISIT (OUTPATIENT)
Dept: PRIMARY CARE | Facility: CLINIC | Age: 79
End: 2024-05-07
Payer: MEDICARE

## 2024-05-07 ENCOUNTER — TELEPHONE (OUTPATIENT)
Dept: CARDIOLOGY | Facility: CLINIC | Age: 79
End: 2024-05-07

## 2024-05-07 VITALS
RESPIRATION RATE: 18 BRPM | HEIGHT: 72 IN | BODY MASS INDEX: 28.44 KG/M2 | SYSTOLIC BLOOD PRESSURE: 110 MMHG | DIASTOLIC BLOOD PRESSURE: 64 MMHG | TEMPERATURE: 96.8 F | OXYGEN SATURATION: 99 % | HEART RATE: 105 BPM | WEIGHT: 210 LBS

## 2024-05-07 DIAGNOSIS — M19.012 PRIMARY OSTEOARTHRITIS OF LEFT SHOULDER: ICD-10-CM

## 2024-05-07 DIAGNOSIS — Z01.810 ENCOUNTER FOR PRE-OPERATIVE CARDIOVASCULAR CLEARANCE: ICD-10-CM

## 2024-05-07 DIAGNOSIS — I44.4 LEFT ANTERIOR HEMIBLOCK: ICD-10-CM

## 2024-05-07 DIAGNOSIS — S46.012S TRAUMATIC COMPLETE TEAR OF LEFT ROTATOR CUFF, SEQUELA: Primary | ICD-10-CM

## 2024-05-07 DIAGNOSIS — Z01.818 PREOPERATIVE CLEARANCE: ICD-10-CM

## 2024-05-07 DIAGNOSIS — I45.10 RIGHT BUNDLE BRANCH BLOCK (RBBB) ON ELECTROCARDIOGRAPHY: ICD-10-CM

## 2024-05-07 DIAGNOSIS — E11.9 TYPE 2 DIABETES MELLITUS WITHOUT COMPLICATION, WITHOUT LONG-TERM CURRENT USE OF INSULIN (MULTI): ICD-10-CM

## 2024-05-07 PROCEDURE — 99214 OFFICE O/P EST MOD 30 MIN: CPT | Performed by: FAMILY MEDICINE

## 2024-05-07 PROCEDURE — 93000 ELECTROCARDIOGRAM COMPLETE: CPT | Performed by: FAMILY MEDICINE

## 2024-05-07 PROCEDURE — 3074F SYST BP LT 130 MM HG: CPT | Performed by: FAMILY MEDICINE

## 2024-05-07 PROCEDURE — 1123F ACP DISCUSS/DSCN MKR DOCD: CPT | Performed by: FAMILY MEDICINE

## 2024-05-07 PROCEDURE — 1160F RVW MEDS BY RX/DR IN RCRD: CPT | Performed by: FAMILY MEDICINE

## 2024-05-07 PROCEDURE — 3078F DIAST BP <80 MM HG: CPT | Performed by: FAMILY MEDICINE

## 2024-05-07 PROCEDURE — 1159F MED LIST DOCD IN RCRD: CPT | Performed by: FAMILY MEDICINE

## 2024-05-07 ASSESSMENT — ENCOUNTER SYMPTOMS
OCCASIONAL FEELINGS OF UNSTEADINESS: 0
NUMBNESS: 0
DEPRESSION: 0
LOSS OF SENSATION IN FEET: 0
TINGLING: 0

## 2024-05-07 NOTE — PATIENT INSTRUCTIONS

## 2024-05-07 NOTE — TELEPHONE ENCOUNTER
Patient called and stated he was having a shoulder replacement on 5/28/24 and needs cardiac clearance from Dr. Harley Weems MD sent to Dr. Menendez. Routed to Annia Ansari LPN

## 2024-05-07 NOTE — PROGRESS NOTES
"Subjective   Patient ID: Scottie Blandon is a 78 y.o. male who presents for Pre-op Exam.    This patient was referred to me by Dr. Jose Guadalupe Menendez for pre-op evaluation prior to Left Shoulder: Arthroplasty Reverse Shoulder which is scheduled for 5/28/2024 at [facility].       Shoulder Injury   Incident location: outside in the woods camping. The left shoulder is affected. The injury mechanism was a fall. The quality of the pain is described as aching and stabbing. The pain does not radiate. Pertinent negatives include no chest pain, numbness or tingling. The symptoms are aggravated by movement and overhead lifting.        Review of Systems   Constitutional:  Negative for chills and fever.   HENT:  Negative for congestion, ear pain, nosebleeds, rhinorrhea and sore throat.    Respiratory:  Negative for cough, shortness of breath and wheezing.    Cardiovascular:  Negative for chest pain, palpitations and leg swelling.   Gastrointestinal:  Negative for abdominal pain, constipation, diarrhea and vomiting.   Genitourinary:  Negative for dysuria, frequency and hematuria.   Neurological:  Negative for dizziness, tingling, tremors, numbness and headaches.       Objective   /64   Pulse 105   Temp 36 °C (96.8 °F)   Resp 18   Ht 1.83 m (6' 0.05\")   Wt 95.3 kg (210 lb)   SpO2 99%   BMI 28.44 kg/m²     Physical Exam  Constitutional:       General: He is not in acute distress.     Appearance: Normal appearance.   HENT:      Head: Normocephalic and atraumatic.      Mouth/Throat:      Mouth: Mucous membranes are moist.      Pharynx: Oropharynx is clear. No oropharyngeal exudate or posterior oropharyngeal erythema.   Eyes:      General: No scleral icterus.     Extraocular Movements: Extraocular movements intact.      Pupils: Pupils are equal, round, and reactive to light.   Cardiovascular:      Rate and Rhythm: Normal rate and regular rhythm.      Pulses: Normal pulses.      Heart sounds: No murmur heard.     No friction rub. No " gallop.   Pulmonary:      Effort: Pulmonary effort is normal.      Breath sounds: No wheezing, rhonchi or rales.   Skin:     General: Skin is warm.      Coloration: Skin is not jaundiced or pale.      Findings: No erythema or rash.   Neurological:      General: No focal deficit present.      Mental Status: He is alert and oriented to person, place, and time.      Cranial Nerves: No cranial nerve deficit.      Sensory: No sensory deficit.      Coordination: Coordination normal.      Gait: Gait normal.     He had negative nuclear stress test 1 and half years ago and also echocardiogram 16 months ago.    Assessment/Plan   Problem List Items Addressed This Visit       Right bundle branch block (RBBB) on electrocardiography     Chronic Condition Documentation : Asymptomatic based on symptoms and exam.  He will be scheduled for cardiac clearance by his cardiologist Dr. Weems.         Type 2 diabetes mellitus without complication, without long-term current use of insulin ([facility])     Stable, continue current medications and management.         Left anterior hemiblock     Chronic Condition Documentation : Asymptomatic based on symptoms and exam.  He will be scheduled for cardiac clearance by his cardiologist Dr. Weems.         Degenerative joint disease of left shoulder     Continue current medications and management.         Complete rotator cuff tear or rupture of left shoulder, not specified as traumatic - Primary     Continue current medications and management.         Preoperative clearance     We will have him see Dr. Weems to obtain cardiac clearance.         Relevant Orders    ECG 12 lead (Clinic Performed) (Completed)     Other Visit Diagnoses       Encounter for pre-operative cardiovascular clearance        Relevant Orders    Referral to Cardiology          Patient has been cleared for surgery by Cardiology, Dr. Weems, okay to proceed with surgery.    Scribe Attestation  By signing  my name below, I, Percy Stein   attest that this documentation has been prepared under the direction and in the presence of Roger Wakefield MD.    Patient was identified as a fall risk. Risk prevention instructions provided.

## 2024-05-08 ASSESSMENT — ENCOUNTER SYMPTOMS
TREMORS: 0
FREQUENCY: 0
SORE THROAT: 0
COUGH: 0
ABDOMINAL PAIN: 0
HEADACHES: 0
HEMATURIA: 0
CHILLS: 0
DIZZINESS: 0
SHORTNESS OF BREATH: 0
FEVER: 0
CONSTIPATION: 0
WHEEZING: 0
DYSURIA: 0
RHINORRHEA: 0
PALPITATIONS: 0
VOMITING: 0
DIARRHEA: 0

## 2024-05-08 NOTE — TELEPHONE ENCOUNTER
Per Dr. Harley Weems , patient is an acceptable cardiac risk for upcoming  shoulder replacement surgery with Dr. Menendez.

## 2024-05-08 NOTE — ASSESSMENT & PLAN NOTE
Chronic Condition Documentation : Asymptomatic based on symptoms and exam.  He will be scheduled for cardiac clearance by his cardiologist Dr. Weems.

## 2024-05-13 DIAGNOSIS — Z01.818 PRE-OPERATIVE CLEARANCE: Primary | ICD-10-CM

## 2024-05-13 RX ORDER — CHLORHEXIDINE GLUCONATE ORAL RINSE 1.2 MG/ML
15 SOLUTION DENTAL AS NEEDED
Qty: 30 ML | Refills: 0 | Status: SHIPPED | OUTPATIENT
Start: 2024-05-13 | End: 2024-05-15

## 2024-05-14 ENCOUNTER — TELEPHONE (OUTPATIENT)
Dept: PRIMARY CARE | Facility: CLINIC | Age: 79
End: 2024-05-14

## 2024-05-14 ENCOUNTER — PRE-ADMISSION TESTING (OUTPATIENT)
Dept: PREADMISSION TESTING | Facility: HOSPITAL | Age: 79
End: 2024-05-14
Payer: MEDICARE

## 2024-05-14 VITALS
HEART RATE: 85 BPM | HEIGHT: 72 IN | DIASTOLIC BLOOD PRESSURE: 68 MMHG | OXYGEN SATURATION: 99 % | BODY MASS INDEX: 28.61 KG/M2 | RESPIRATION RATE: 18 BRPM | SYSTOLIC BLOOD PRESSURE: 136 MMHG | WEIGHT: 211.2 LBS

## 2024-05-14 DIAGNOSIS — Z01.818 PRE-OP TESTING: ICD-10-CM

## 2024-05-14 LAB
ALBUMIN SERPL BCP-MCNC: 4.5 G/DL (ref 3.4–5)
ALP SERPL-CCNC: 43 U/L (ref 33–136)
ALT SERPL W P-5'-P-CCNC: 10 U/L (ref 10–52)
ANION GAP SERPL CALC-SCNC: 14 MMOL/L (ref 10–20)
AST SERPL W P-5'-P-CCNC: 11 U/L (ref 9–39)
BASOPHILS # BLD AUTO: 0.03 X10*3/UL (ref 0–0.1)
BASOPHILS NFR BLD AUTO: 0.5 %
BILIRUB SERPL-MCNC: 0.8 MG/DL (ref 0–1.2)
BUN SERPL-MCNC: 13 MG/DL (ref 6–23)
CALCIUM SERPL-MCNC: 9.9 MG/DL (ref 8.6–10.3)
CHLORIDE SERPL-SCNC: 104 MMOL/L (ref 98–107)
CO2 SERPL-SCNC: 25 MMOL/L (ref 21–32)
CREAT SERPL-MCNC: 0.95 MG/DL (ref 0.5–1.3)
EGFRCR SERPLBLD CKD-EPI 2021: 83 ML/MIN/1.73M*2
EOSINOPHIL # BLD AUTO: 0.1 X10*3/UL (ref 0–0.4)
EOSINOPHIL NFR BLD AUTO: 1.7 %
ERYTHROCYTE [DISTWIDTH] IN BLOOD BY AUTOMATED COUNT: 13 % (ref 11.5–14.5)
EST. AVERAGE GLUCOSE BLD GHB EST-MCNC: 151 MG/DL
GLUCOSE SERPL-MCNC: 133 MG/DL (ref 74–99)
HBA1C MFR BLD: 6.9 %
HCT VFR BLD AUTO: 35.1 % (ref 41–52)
HGB BLD-MCNC: 11.4 G/DL (ref 13.5–17.5)
IMM GRANULOCYTES # BLD AUTO: 0.04 X10*3/UL (ref 0–0.5)
IMM GRANULOCYTES NFR BLD AUTO: 0.7 % (ref 0–0.9)
INR PPP: 1.1 (ref 0.9–1.1)
LYMPHOCYTES # BLD AUTO: 1.36 X10*3/UL (ref 0.8–3)
LYMPHOCYTES NFR BLD AUTO: 23.4 %
MCH RBC QN AUTO: 30.7 PG (ref 26–34)
MCHC RBC AUTO-ENTMCNC: 32.5 G/DL (ref 32–36)
MCV RBC AUTO: 95 FL (ref 80–100)
MONOCYTES # BLD AUTO: 0.5 X10*3/UL (ref 0.05–0.8)
MONOCYTES NFR BLD AUTO: 8.6 %
NEUTROPHILS # BLD AUTO: 3.78 X10*3/UL (ref 1.6–5.5)
NEUTROPHILS NFR BLD AUTO: 65.1 %
NRBC BLD-RTO: 0 /100 WBCS (ref 0–0)
PLATELET # BLD AUTO: 250 X10*3/UL (ref 150–450)
POTASSIUM SERPL-SCNC: 4.2 MMOL/L (ref 3.5–5.3)
PROT SERPL-MCNC: 6.8 G/DL (ref 6.4–8.2)
PROTHROMBIN TIME: 12.6 SECONDS (ref 9.8–12.8)
RBC # BLD AUTO: 3.71 X10*6/UL (ref 4.5–5.9)
SODIUM SERPL-SCNC: 139 MMOL/L (ref 136–145)
WBC # BLD AUTO: 5.8 X10*3/UL (ref 4.4–11.3)

## 2024-05-14 PROCEDURE — 83036 HEMOGLOBIN GLYCOSYLATED A1C: CPT | Mod: ELYLAB

## 2024-05-14 PROCEDURE — 85610 PROTHROMBIN TIME: CPT

## 2024-05-14 PROCEDURE — 36415 COLL VENOUS BLD VENIPUNCTURE: CPT

## 2024-05-14 PROCEDURE — 80053 COMPREHEN METABOLIC PANEL: CPT

## 2024-05-14 PROCEDURE — 85025 COMPLETE CBC W/AUTO DIFF WBC: CPT

## 2024-05-14 PROCEDURE — 87081 CULTURE SCREEN ONLY: CPT | Mod: ELYLAB

## 2024-05-14 RX ORDER — IBUPROFEN 200 MG
200 TABLET ORAL EVERY 6 HOURS PRN
COMMUNITY

## 2024-05-14 NOTE — TELEPHONE ENCOUNTER
Scottie's wife, Marta, called in. He is having surgery on 5/28 and that doctor office called them today to have them ask Dr. Wakefield when Scottie should stop taking the Farxiga medication, or if there are any other medications he needs to stop taking and when, prior to surgery. Please advise.

## 2024-05-14 NOTE — PREPROCEDURE INSTRUCTIONS
Patient here for PAT visit; labs and MRSA swab obtained. EKG on file. CHG skin solution  and joint instruction book given to and reviewed with patient. Written pre-op instructions reviewed with patient.  Patient aware to hold aspirin prior to procedure and aware to obtain info to hold Farxiga prior to procedure

## 2024-05-14 NOTE — TELEPHONE ENCOUNTER
Per [facility] --- he is to stop the farxiga 2 days prior and resume one day after -- this is the one med [facility] prescribes -- he is to also stop the aspirin 10 days prior. The Celebrex is not prescribed by [facility]

## 2024-05-16 LAB — STAPHYLOCOCCUS SPEC CULT: NORMAL

## 2024-05-22 ENCOUNTER — OFFICE VISIT (OUTPATIENT)
Dept: CARDIOLOGY | Facility: CLINIC | Age: 79
End: 2024-05-22
Payer: MEDICARE

## 2024-05-22 ENCOUNTER — TELEPHONE (OUTPATIENT)
Dept: PRIMARY CARE | Facility: CLINIC | Age: 79
End: 2024-05-22

## 2024-05-22 ENCOUNTER — OFFICE VISIT (OUTPATIENT)
Dept: ORTHOPEDIC SURGERY | Facility: CLINIC | Age: 79
End: 2024-05-22
Payer: MEDICARE

## 2024-05-22 VITALS
DIASTOLIC BLOOD PRESSURE: 64 MMHG | HEART RATE: 60 BPM | BODY MASS INDEX: 28.35 KG/M2 | SYSTOLIC BLOOD PRESSURE: 124 MMHG | WEIGHT: 209 LBS

## 2024-05-22 DIAGNOSIS — Z98.890 HISTORY OF REVERSE TOTAL REPLACEMENT OF LEFT SHOULDER JOINT: Primary | ICD-10-CM

## 2024-05-22 DIAGNOSIS — I44.4 LEFT ANTERIOR HEMIBLOCK: ICD-10-CM

## 2024-05-22 DIAGNOSIS — Z87.891 FORMER SMOKER: ICD-10-CM

## 2024-05-22 DIAGNOSIS — I45.10 RIGHT BUNDLE BRANCH BLOCK (RBBB) ON ELECTROCARDIOGRAPHY: ICD-10-CM

## 2024-05-22 DIAGNOSIS — I25.10 CORONARY ARTERY DISEASE INVOLVING NATIVE CORONARY ARTERY OF NATIVE HEART WITHOUT ANGINA PECTORIS: ICD-10-CM

## 2024-05-22 DIAGNOSIS — E11.9 TYPE 2 DIABETES MELLITUS WITHOUT COMPLICATION, WITHOUT LONG-TERM CURRENT USE OF INSULIN (MULTI): ICD-10-CM

## 2024-05-22 DIAGNOSIS — E78.2 MIXED HYPERLIPIDEMIA: ICD-10-CM

## 2024-05-22 DIAGNOSIS — I10 ESSENTIAL HYPERTENSION: ICD-10-CM

## 2024-05-22 DIAGNOSIS — I35.0 MILD AORTIC STENOSIS: ICD-10-CM

## 2024-05-22 PROCEDURE — 1036F TOBACCO NON-USER: CPT | Performed by: INTERNAL MEDICINE

## 2024-05-22 PROCEDURE — 3074F SYST BP LT 130 MM HG: CPT | Performed by: INTERNAL MEDICINE

## 2024-05-22 PROCEDURE — 1123F ACP DISCUSS/DSCN MKR DOCD: CPT | Performed by: PHYSICIAN ASSISTANT

## 2024-05-22 PROCEDURE — 1159F MED LIST DOCD IN RCRD: CPT | Performed by: PHYSICIAN ASSISTANT

## 2024-05-22 PROCEDURE — 1160F RVW MEDS BY RX/DR IN RCRD: CPT | Performed by: INTERNAL MEDICINE

## 2024-05-22 PROCEDURE — 1160F RVW MEDS BY RX/DR IN RCRD: CPT | Performed by: PHYSICIAN ASSISTANT

## 2024-05-22 PROCEDURE — 3078F DIAST BP <80 MM HG: CPT | Performed by: INTERNAL MEDICINE

## 2024-05-22 PROCEDURE — 1123F ACP DISCUSS/DSCN MKR DOCD: CPT | Performed by: INTERNAL MEDICINE

## 2024-05-22 PROCEDURE — L3670 SO ACRO/CLAV CAN WEB PRE OTS: HCPCS | Performed by: PHYSICIAN ASSISTANT

## 2024-05-22 PROCEDURE — 99214 OFFICE O/P EST MOD 30 MIN: CPT | Performed by: INTERNAL MEDICINE

## 2024-05-22 PROCEDURE — 1159F MED LIST DOCD IN RCRD: CPT | Performed by: INTERNAL MEDICINE

## 2024-05-22 PROCEDURE — 99024 POSTOP FOLLOW-UP VISIT: CPT | Performed by: PHYSICIAN ASSISTANT

## 2024-05-22 NOTE — TELEPHONE ENCOUNTER
Judi from Dr. Jose Guadalupe Menendez's office called in regarding two different matters for patient. The first concern is that patient was seen by Dr. Wakefield on 5/7 for a pre op clearance appointment and the note doesn't state whether patient is cleared or not. Judi states there is no pre op clearance paperwork that needs to be filled out, just needs to state in the office note whether patient is cleared or not.     Second concern was regarding patient's farxiga prescription. Judi states the patient has a telephone encounter in their chart regarding when Dr. Wakefield wants patient to stop farxiga and it states that Dr. Wakefield said to stop farxiga 2 days prior and resume one day after. Judi states the hospital is stating patient should stop 3 days prior to surgery.     Please advise.     [phone] option 4

## 2024-05-22 NOTE — PATIENT INSTRUCTIONS
Patient is an acceptable cardiac risk for upcoming shoulder surgery.    Echocardiogram to be done in 1 year  Follow up office visit in 1 year.    Continue same medications/treatment.  Patient educated on proper medication use.  Patient educated on risk factor modification.  Please bring any lab results from other providers / physicians to your next appointment.    Please bring all medicines, vitamins and herbal supplements with you when you come to the office.    Prescriptions will not be filled unless you are compliant with your follow up appointments or have a follow up  appointment scheduled as per instruction of your physician.  Refills should be requested at the time of  Your visit.    Jessica DUMONT LPN, am scribing for and in the presence of Dr. Harley Weems MD, FACC

## 2024-05-22 NOTE — PROGRESS NOTES
History and Physical      CHIEF COMPLAINT: Left shoulder pain    HISTORY OF PRESENT ILLNESS:      The patient is a78 y.o. male with significant past medical history of left shoulder pain.  Diagnostic studies show degenerative joint disease and rotator cuff tearing conservative therapy is not providing relief.  After risk benefits alternatives were discussed patient elects to proceed with left reverse total shoulder arthroplasty.      Past Medical History:  Past Medical History:   Diagnosis Date    Arthritis     BPH (benign prostatic hyperplasia)     Chest pain 2023    Diverticulosis     GERD (gastroesophageal reflux disease)     GI bleed 2023    Hyperlipidemia     Hypertension     Joint pain     Shortness of breath     Type 2 diabetes mellitus (Multi)     Wears dentures     Wears glasses         Past Surgical History:    Past Surgical History:   Procedure Laterality Date    CARDIAC CATHETERIZATION      CATARACT EXTRACTION      COLONOSCOPY      HERNIA REPAIR      TONSILLECTOMY      UPPER GASTROINTESTINAL ENDOSCOPY         Medications Prior to Admission:    Current Outpatient Medications on File Prior to Visit   Medication Sig Dispense Refill    allopurinol (Zyloprim) 300 mg tablet Take 1 tablet by mouth once daily. 90 tablet 2    aspirin 81 mg EC tablet Take 1 tablet (81 mg) by mouth once daily.      blood sugar diagnostic (Blood Glucose Test) strip 3 times a day.      celecoxib (CeleBREX) 200 mg capsule Take 1 capsule (200 mg) by mouth once daily.      [] chlorhexidine (Peridex) 0.12 % solution Use 15 mL in the mouth or throat if needed for wound care for up to 2 days. Use night before surgery and the morning of sugery 30 mL 0    dapagliflozin propanediol (Farxiga) 5 mg Take 1 tablet (5 mg) by mouth once daily. 30 tablet 2    fenofibrate (Triglide) 160 mg tablet TAKE 1 TABLET DAILY. 30 tablet 5    ferrous sulfate 324 mg (65 mg iron) EC tablet Take 1 tablet (65 mg) by mouth once daily with  breakfast.      glipiZIDE (Glucotrol) 10 mg tablet Take 1 tablet (10 mg) by mouth 2 times a day before meals. 180 tablet 1    ibuprofen 200 mg tablet Take 1 tablet (200 mg) by mouth every 6 hours if needed for mild pain (1 - 3).      lancets 30 gauge misc in the morning, at noon, and at bedtime.      lisinopril 2.5 mg tablet Take 1 tablet (2.5 mg) by mouth once daily. 90 tablet 1    metFORMIN (Glucophage) 500 mg tablet Take 2 tablets (1,000 mg) by mouth 2 times a day with meals. 360 tablet 1    NIACIN, NIACINAMIDE, ORAL Take 1 tablet by mouth 2 times a day.      omeprazole (PriLOSEC) 20 mg DR capsule Take 1 capsule (20 mg) by mouth once daily. 90 capsule 1    pravastatin (Pravachol) 80 mg tablet TAKE 1 TABLET BY MOUTH DAILY 90 tablet 1    SITagliptin phosphate (Januvia) 100 mg tablet Take 1 tablet (100 mg) by mouth once daily. 90 tablet 3    tamsulosin (Flomax) 0.4 mg 24 hr capsule Take 1 capsule (0.4 mg) by mouth once daily. 90 capsule 1     No current facility-administered medications on file prior to visit.        Allergies:  Atorvastatin and Rosuvastatin    Social History:   Social History     Socioeconomic History    Marital status:      Spouse name: Not on file    Number of children: Not on file    Years of education: Not on file    Highest education level: Not on file   Occupational History    Not on file   Tobacco Use    Smoking status: Former     Current packs/day: 0.00     Types: Cigarettes     Quit date: 2009     Years since quitting: 15.3    Smokeless tobacco: Never   Vaping Use    Vaping status: Never Used   Substance and Sexual Activity    Alcohol use: Yes     Comment: 1-2 drinks a month    Drug use: Yes     Types: Marijuana     Comment: monthly    Sexual activity: Defer   Other Topics Concern    Not on file   Social History Narrative    Not on file     Social Determinants of Health     Financial Resource Strain: Not on file   Food Insecurity: Not on file   Transportation Needs: Not on file    Physical Activity: Not on file   Stress: Not on file   Social Connections: Not on file   Intimate Partner Violence: Not on file   Housing Stability: Not on file       Family History:   Family History   Problem Relation Name Age of Onset    Colon cancer Father          Review of systems: Noncontributory for orthopedics    Vitals:  There were no vitals taken for this visit.    Physical examination:  Head normocephalic atraumatic  Heart regular rate and rhythm  Lungs clear  Abdominal exam nontender nondistended  Extremity: Left shoulder forward flexion up to 150 degrees abduction of 80 degrees external rotation of 70 degrees internal rotation L4-5    Impression : Left shoulder degenerative joint disease and rotator cuff tear      Plan:  Scheduled for left reverse total shoulder arthroplasty    Risk and benefits of surgery discussed extensively with the patient.    Surgical risk included but were not limited to infection, wear, loosening, need for further surgery blood clot, failure to heal, failure of the surgery, stiffness, loss of limb life, extremity function change in length change, and associated risks of surgery during the coronavirus epidemic.    Risk with any surgery including arthroplasty and replacements include but are not limited to:       Wear, loosening, infection, blood clot, DVT, loss of limb, life, delayed recovery, limb length change, instability, dislocation, discomfort with new implant and failure of the procedure.

## 2024-05-22 NOTE — PROGRESS NOTES
CARDIOLOGY OFFICE VISIT      CHIEF COMPLAINT      HISTORY OF PRESENT ILLNESS  The patient states he has been feeling well.  He states he is going to have shoulder surgery next week at [facility].  He denies chest discomfort or symptoms of myocardial ischemia.  He denies dyspnea with exertion.  He denies palpitations syncope.  He has not had any surgeries for a long time.    EKG: Normal sinus rhythm, left axis deviation, left anterior hemiblock, complete right bundle branch block, results discussed with patient, done 5/7/2024      Impression:  Coronary artery disease manifested by CT coronary calcium score of 78, January 2023, no angina  Hypertension  Hyperlipidemia  Diabetes mellitus type 2  Former smoker  Positive family history of cardiac pathology  Cardiac conduction system disease manifested by left anterior hemiblock and complete right bundle branch block  Overweight  Former Smoker  Aortic stenosis, mild, by echocardiogram January 2023  Normal left ventricular systolic function by echocardiogram January 2023    Cardiac status is stable at this time.  The patient is a satisfactory risk from a cardiac standpoint for his upcoming needed surgical procedure assuming routine preop lab work is satisfactory.  He is already holding the medications he needs to hold prior to the orthopedic procedure.     Please excuse any errors in grammar or translation related to this dictation. Voice recognition software was utilized to prepare this document.         Past Medical History  Past Medical History:   Diagnosis Date    Arthritis     BPH (benign prostatic hyperplasia)     Chest pain 01/16/2023    Diverticulosis     GERD (gastroesophageal reflux disease)     GI bleed 03/20/2023    Hyperlipidemia     Hypertension     Joint pain     Shortness of breath     Type 2 diabetes mellitus (Multi)     Wears dentures     Wears glasses        Social History  Social History     Tobacco Use    Smoking status: Former     Current packs/day: 0.00      Types: Cigarettes     Quit date: 2009     Years since quitting: 15.3    Smokeless tobacco: Never   Vaping Use    Vaping status: Never Used   Substance Use Topics    Alcohol use: Yes     Comment: 1-2 drinks a month    Drug use: Yes     Types: Marijuana     Comment: monthly       Family History     Family History   Problem Relation Name Age of Onset    Colon cancer Father          Allergies:  Allergies   Allergen Reactions    Atorvastatin Unknown    Rosuvastatin Unknown        Outpatient Medications:  Current Outpatient Medications   Medication Instructions    allopurinol (ZYLOPRIM) 300 mg, oral, Daily    aspirin 81 mg EC tablet 1 tablet, oral, Daily    blood sugar diagnostic (Blood Glucose Test) strip 3 times daily    celecoxib (CELEBREX) 200 mg, oral, Daily    Farxiga 5 mg, oral, Daily    fenofibrate (TRIGLIDE) 160 mg, oral, Daily    ferrous sulfate 324 mg (65 mg iron) EC tablet 1 tablet, oral, Daily with breakfast    glipiZIDE (GLUCOTROL) 10 mg, oral, 2 times daily before meals    ibuprofen 200 mg, oral, Every 6 hours PRN    Januvia 100 mg, oral, Daily    lancets 30 gauge misc miscellaneous, 3 times daily    lisinopril 2.5 mg, oral, Daily    metFORMIN (GLUCOPHAGE) 1,000 mg, oral, 2 times daily (morning and late afternoon)    NIACIN, NIACINAMIDE, ORAL 1 tablet, oral, 2 times daily    omeprazole (PRILOSEC) 20 mg, oral, Daily    pravastatin (PRAVACHOL) 80 mg, oral, Daily    tamsulosin (FLOMAX) 0.4 mg, oral, Daily          REVIEW OF SYSTEMS  Review of Systems   All other systems reviewed and are negative.        VITALS  Vitals:    05/22/24 1551   BP: 124/64   Pulse: 60       PHYSICAL EXAM  Constitutional:       Appearance: Healthy appearance. Not in distress.   Eyes:      Conjunctiva/sclera: Conjunctivae normal.      Pupils: Pupils are equal, round, and reactive to light.   Neck:      Vascular: No JVR. JVD normal.   Pulmonary:      Effort: Pulmonary effort is normal.      Breath sounds: Normal breath sounds. No  wheezing. No rhonchi. No rales.   Chest:      Chest wall: Not tender to palpatation.   Cardiovascular:      PMI at left midclavicular line. Normal rate. Regular rhythm. Normal S1. Normal S2.       Murmurs: There is no murmur.      No gallop.  No click. No rub.   Pulses:     Intact distal pulses.   Edema:     Peripheral edema absent.   Abdominal:      Tenderness: There is no abdominal tenderness.   Musculoskeletal: Normal range of motion.         General: No tenderness.      Cervical back: Normal range of motion. Skin:     General: Skin is warm and dry.   Neurological:      General: No focal deficit present.      Mental Status: Alert and oriented to person, place and time.           ASSESSMENT AND PLAN  Diagnoses and all orders for this visit:  Coronary artery disease involving native coronary artery of native heart without angina pectoris  Essential hypertension  Mixed hyperlipidemia  Type 2 diabetes mellitus without complication, without long-term current use of insulin (Multi)  Former smoker  Left anterior hemiblock  Right bundle branch block (RBBB) on electrocardiography  BMI 28.0-28.9,adult  Mild aortic stenosis      [unfilled]

## 2024-05-23 NOTE — TELEPHONE ENCOUNTER
Per Dr. Wakefield he did not include the clearance as he was not able to clear him due to referring patient to Cardiology for clearance. And as for her second concern Dr. Wakefield is fine with patient stopping the Farxiga 3 days prior if that is what the hospital advises.    Left voicemail for Judi to call the office back with business hours.

## 2024-05-23 NOTE — H&P
History Of Present Illness  Scottie Blandon is a 78 y.o. male presenting with left shoulder pain.  Diagnostic studies show degenerative joint disease and rotator cuff tear.  Conservative therapy is not providing relief.  After risk benefits alternatives were discussed patient elects to proceed with left reverse total shoulder arthroplasty.     Past Medical History  He has a past medical history of Arthritis, BPH (benign prostatic hyperplasia), Chest pain (01/16/2023), Diverticulosis, GERD (gastroesophageal reflux disease), GI bleed (03/20/2023), Hyperlipidemia, Hypertension, Joint pain, Shortness of breath, Type 2 diabetes mellitus (Multi), Wears dentures, and Wears glasses.    Surgical History  He has a past surgical history that includes Tonsillectomy; Hernia repair; Cardiac catheterization; Colonoscopy; Upper gastrointestinal endoscopy; and Cataract extraction.     Social History  He reports that he quit smoking about 15 years ago. His smoking use included cigarettes. He has never used smokeless tobacco. He reports current alcohol use. He reports current drug use. Drug: Marijuana.    Family History  Family History   Problem Relation Name Age of Onset    Colon cancer Father          Allergies  Atorvastatin and Rosuvastatin    Review of Systems  Noncontributory     Physical Exam  Head normocephalic atraumatic wears a beard  Chest lungs are clear to auscultation  Cardiac regular rate rhythm no rubs gallops appreciated  Abdomen soft nontender normoactive bowel sounds x 4 quadrants  Extremities left shoulder patient has forward flexion to 140 degrees abduction of 80 degrees external rotation of 40 degrees internal rotation posterior iliac crest     Last Recorded Vitals  There were no vitals taken for this visit.    Relevant Results      Scheduled medications    Continuous medications    PRN medications    No results found for this or any previous visit (from the past 24 hour(s)).    Assessment/Plan   Active Problems:     Degenerative joint disease of left shoulder    Complete rotator cuff tear or rupture of left shoulder, not specified as traumatic      Left reverse total shoulder arthroplasty           Jose Guadalupe Naylor PA-C

## 2024-05-23 NOTE — TELEPHONE ENCOUNTER
Per Dr. Ashu anderson to addend the office visit to state patient has been cleared by Cardiology and jutsin to proceed with the procedure.    Note addended

## 2024-05-23 NOTE — TELEPHONE ENCOUNTER
Spoke with Judi and she stated Dr. Weems has cleared the patient but they still need Dr. Wakefield's note to reflect approval on his side.     Judi will contact the patient to let him know okay to stop Farxiga 3 days prior.

## 2024-05-25 PROCEDURE — RXMED WILLOW AMBULATORY MEDICATION CHARGE

## 2024-05-28 ENCOUNTER — ANESTHESIA EVENT (OUTPATIENT)
Dept: OPERATING ROOM | Facility: HOSPITAL | Age: 79
End: 2024-05-28
Payer: MEDICARE

## 2024-05-28 ENCOUNTER — ANESTHESIA (OUTPATIENT)
Dept: OPERATING ROOM | Facility: HOSPITAL | Age: 79
End: 2024-05-28
Payer: MEDICARE

## 2024-05-28 ENCOUNTER — HOSPITAL ENCOUNTER (OUTPATIENT)
Facility: HOSPITAL | Age: 79
Discharge: HOME | End: 2024-05-29
Attending: ORTHOPAEDIC SURGERY | Admitting: ORTHOPAEDIC SURGERY
Payer: MEDICARE

## 2024-05-28 ENCOUNTER — APPOINTMENT (OUTPATIENT)
Dept: RADIOLOGY | Facility: HOSPITAL | Age: 79
End: 2024-05-28
Payer: MEDICARE

## 2024-05-28 DIAGNOSIS — M75.122 COMPLETE TEAR OF LEFT ROTATOR CUFF, UNSPECIFIED WHETHER TRAUMATIC: ICD-10-CM

## 2024-05-28 DIAGNOSIS — Z96.619 HISTORY OF REVERSE TOTAL REPLACEMENT OF SHOULDER JOINT: ICD-10-CM

## 2024-05-28 DIAGNOSIS — M19.212 OTHER SECONDARY OSTEOARTHRITIS OF LEFT SHOULDER: Primary | ICD-10-CM

## 2024-05-28 LAB
GLUCOSE BLD MANUAL STRIP-MCNC: 160 MG/DL (ref 74–99)
GLUCOSE BLD MANUAL STRIP-MCNC: 190 MG/DL (ref 74–99)
GLUCOSE BLD MANUAL STRIP-MCNC: 208 MG/DL (ref 74–99)
GLUCOSE BLD MANUAL STRIP-MCNC: 280 MG/DL (ref 74–99)

## 2024-05-28 PROCEDURE — 2500000002 HC RX 250 W HCPCS SELF ADMINISTERED DRUGS (ALT 637 FOR MEDICARE OP, ALT 636 FOR OP/ED): Performed by: ORTHOPAEDIC SURGERY

## 2024-05-28 PROCEDURE — 7100000002 HC RECOVERY ROOM TIME - EACH INCREMENTAL 1 MINUTE: Performed by: ORTHOPAEDIC SURGERY

## 2024-05-28 PROCEDURE — 82947 ASSAY GLUCOSE BLOOD QUANT: CPT

## 2024-05-28 PROCEDURE — 3700000002 HC GENERAL ANESTHESIA TIME - EACH INCREMENTAL 1 MINUTE: Performed by: ORTHOPAEDIC SURGERY

## 2024-05-28 PROCEDURE — 2500000004 HC RX 250 GENERAL PHARMACY W/ HCPCS (ALT 636 FOR OP/ED): Performed by: ORTHOPAEDIC SURGERY

## 2024-05-28 PROCEDURE — C1713 ANCHOR/SCREW BN/BN,TIS/BN: HCPCS | Performed by: ORTHOPAEDIC SURGERY

## 2024-05-28 PROCEDURE — A4217 STERILE WATER/SALINE, 500 ML: HCPCS | Performed by: ORTHOPAEDIC SURGERY

## 2024-05-28 PROCEDURE — 99222 1ST HOSP IP/OBS MODERATE 55: CPT | Performed by: REGISTERED NURSE

## 2024-05-28 PROCEDURE — 2500000005 HC RX 250 GENERAL PHARMACY W/O HCPCS: Performed by: NURSE ANESTHETIST, CERTIFIED REGISTERED

## 2024-05-28 PROCEDURE — 2500000004 HC RX 250 GENERAL PHARMACY W/ HCPCS (ALT 636 FOR OP/ED): Mod: JZ | Performed by: ORTHOPAEDIC SURGERY

## 2024-05-28 PROCEDURE — 3600000010 HC OR TIME - EACH INCREMENTAL 1 MINUTE - PROCEDURE LEVEL FIVE: Performed by: ORTHOPAEDIC SURGERY

## 2024-05-28 PROCEDURE — 73020 X-RAY EXAM OF SHOULDER: CPT | Mod: LEFT SIDE | Performed by: RADIOLOGY

## 2024-05-28 PROCEDURE — C1776 JOINT DEVICE (IMPLANTABLE): HCPCS | Performed by: ORTHOPAEDIC SURGERY

## 2024-05-28 PROCEDURE — 2500000004 HC RX 250 GENERAL PHARMACY W/ HCPCS (ALT 636 FOR OP/ED): Performed by: STUDENT IN AN ORGANIZED HEALTH CARE EDUCATION/TRAINING PROGRAM

## 2024-05-28 PROCEDURE — 2720000007 HC OR 272 NO HCPCS: Performed by: ORTHOPAEDIC SURGERY

## 2024-05-28 PROCEDURE — 2500000001 HC RX 250 WO HCPCS SELF ADMINISTERED DRUGS (ALT 637 FOR MEDICARE OP): Performed by: STUDENT IN AN ORGANIZED HEALTH CARE EDUCATION/TRAINING PROGRAM

## 2024-05-28 PROCEDURE — G0378 HOSPITAL OBSERVATION PER HR: HCPCS

## 2024-05-28 PROCEDURE — 3600000005 HC OR TIME - INITIAL BASE CHARGE - PROCEDURE LEVEL FIVE: Performed by: ORTHOPAEDIC SURGERY

## 2024-05-28 PROCEDURE — 2500000002 HC RX 250 W HCPCS SELF ADMINISTERED DRUGS (ALT 637 FOR MEDICARE OP, ALT 636 FOR OP/ED): Performed by: REGISTERED NURSE

## 2024-05-28 PROCEDURE — 23472 RECONSTRUCT SHOULDER JOINT: CPT | Performed by: PHYSICIAN ASSISTANT

## 2024-05-28 PROCEDURE — 2500000005 HC RX 250 GENERAL PHARMACY W/O HCPCS: Performed by: STUDENT IN AN ORGANIZED HEALTH CARE EDUCATION/TRAINING PROGRAM

## 2024-05-28 PROCEDURE — 2500000005 HC RX 250 GENERAL PHARMACY W/O HCPCS: Performed by: ORTHOPAEDIC SURGERY

## 2024-05-28 PROCEDURE — 7100000011 HC EXTENDED STAY RECOVERY HOURLY - NURSING UNIT

## 2024-05-28 PROCEDURE — 2780000003 HC OR 278 NO HCPCS: Performed by: ORTHOPAEDIC SURGERY

## 2024-05-28 PROCEDURE — 2500000001 HC RX 250 WO HCPCS SELF ADMINISTERED DRUGS (ALT 637 FOR MEDICARE OP): Performed by: ORTHOPAEDIC SURGERY

## 2024-05-28 PROCEDURE — 23472 RECONSTRUCT SHOULDER JOINT: CPT | Performed by: ORTHOPAEDIC SURGERY

## 2024-05-28 PROCEDURE — A6213 FOAM DRG >16<=48 SQ IN W/BDR: HCPCS | Performed by: ORTHOPAEDIC SURGERY

## 2024-05-28 PROCEDURE — 3700000001 HC GENERAL ANESTHESIA TIME - INITIAL BASE CHARGE: Performed by: ORTHOPAEDIC SURGERY

## 2024-05-28 PROCEDURE — 2500000004 HC RX 250 GENERAL PHARMACY W/ HCPCS (ALT 636 FOR OP/ED): Performed by: NURSE ANESTHETIST, CERTIFIED REGISTERED

## 2024-05-28 PROCEDURE — 7100000001 HC RECOVERY ROOM TIME - INITIAL BASE CHARGE: Performed by: ORTHOPAEDIC SURGERY

## 2024-05-28 PROCEDURE — 2500000001 HC RX 250 WO HCPCS SELF ADMINISTERED DRUGS (ALT 637 FOR MEDICARE OP): Performed by: REGISTERED NURSE

## 2024-05-28 PROCEDURE — 73020 X-RAY EXAM OF SHOULDER: CPT | Mod: LT

## 2024-05-28 DEVICE — IMPLANTABLE DEVICE
Type: IMPLANTABLE DEVICE | Site: SHOULDER | Status: FUNCTIONAL
Brand: ANATOMICAL SHOULDER™

## 2024-05-28 DEVICE — PIN, TM REVERSE 2.5MM: Type: IMPLANTABLE DEVICE | Site: SHOULDER | Status: NON-FUNCTIONAL

## 2024-05-28 DEVICE — IMPLANTABLE DEVICE: Type: IMPLANTABLE DEVICE | Site: SHOULDER | Status: FUNCTIONAL

## 2024-05-28 DEVICE — POLY LINER, PLUS 6MM OFFSET, 40MM DIA, REVERSE SHOULDER SYSTEM: Type: IMPLANTABLE DEVICE | Site: SHOULDER | Status: FUNCTIONAL

## 2024-05-28 DEVICE — BASE PLATE, MMTM RVS, 26MM, 15MM: Type: IMPLANTABLE DEVICE | Site: SHOULDER | Status: FUNCTIONAL

## 2024-05-28 RX ORDER — ONDANSETRON HYDROCHLORIDE 2 MG/ML
4 INJECTION, SOLUTION INTRAVENOUS ONCE AS NEEDED
Status: DISCONTINUED | OUTPATIENT
Start: 2024-05-28 | End: 2024-05-28 | Stop reason: HOSPADM

## 2024-05-28 RX ORDER — SODIUM CHLORIDE, SODIUM LACTATE, POTASSIUM CHLORIDE, CALCIUM CHLORIDE 600; 310; 30; 20 MG/100ML; MG/100ML; MG/100ML; MG/100ML
50 INJECTION, SOLUTION INTRAVENOUS CONTINUOUS
Status: DISCONTINUED | OUTPATIENT
Start: 2024-05-28 | End: 2024-05-29 | Stop reason: HOSPADM

## 2024-05-28 RX ORDER — FAMOTIDINE 10 MG/ML
INJECTION INTRAVENOUS AS NEEDED
Status: DISCONTINUED | OUTPATIENT
Start: 2024-05-28 | End: 2024-05-28

## 2024-05-28 RX ORDER — PROPOFOL 10 MG/ML
INJECTION, EMULSION INTRAVENOUS AS NEEDED
Status: DISCONTINUED | OUTPATIENT
Start: 2024-05-28 | End: 2024-05-28

## 2024-05-28 RX ORDER — MORPHINE SULFATE 2 MG/ML
2 INJECTION, SOLUTION INTRAMUSCULAR; INTRAVENOUS EVERY 2 HOUR PRN
Status: DISCONTINUED | OUTPATIENT
Start: 2024-05-28 | End: 2024-05-29 | Stop reason: HOSPADM

## 2024-05-28 RX ORDER — SODIUM CHLORIDE 0.9 G/100ML
IRRIGANT IRRIGATION AS NEEDED
Status: DISCONTINUED | OUTPATIENT
Start: 2024-05-28 | End: 2024-05-28 | Stop reason: HOSPADM

## 2024-05-28 RX ORDER — PROCHLORPERAZINE MALEATE 5 MG
10 TABLET ORAL EVERY 6 HOURS PRN
Status: DISCONTINUED | OUTPATIENT
Start: 2024-05-28 | End: 2024-05-29 | Stop reason: HOSPADM

## 2024-05-28 RX ORDER — PANTOPRAZOLE SODIUM 20 MG/1
20 TABLET, DELAYED RELEASE ORAL
Status: DISCONTINUED | OUTPATIENT
Start: 2024-05-29 | End: 2024-05-29 | Stop reason: HOSPADM

## 2024-05-28 RX ORDER — OXYCODONE HYDROCHLORIDE 5 MG/1
5 TABLET ORAL EVERY 4 HOURS PRN
Status: DISCONTINUED | OUTPATIENT
Start: 2024-05-28 | End: 2024-05-29 | Stop reason: HOSPADM

## 2024-05-28 RX ORDER — PROCHLORPERAZINE EDISYLATE 5 MG/ML
10 INJECTION INTRAMUSCULAR; INTRAVENOUS EVERY 6 HOURS PRN
Status: DISCONTINUED | OUTPATIENT
Start: 2024-05-28 | End: 2024-05-29 | Stop reason: HOSPADM

## 2024-05-28 RX ORDER — METFORMIN HYDROCHLORIDE 500 MG/1
1000 TABLET ORAL
Status: DISCONTINUED | OUTPATIENT
Start: 2024-05-28 | End: 2024-05-29 | Stop reason: HOSPADM

## 2024-05-28 RX ORDER — CEFAZOLIN SODIUM 2 G/100ML
2 INJECTION, SOLUTION INTRAVENOUS ONCE
Status: COMPLETED | OUTPATIENT
Start: 2024-05-28 | End: 2024-05-28

## 2024-05-28 RX ORDER — TRANEXAMIC ACID 650 MG/1
1950 TABLET ORAL ONCE
Status: COMPLETED | OUTPATIENT
Start: 2024-05-29 | End: 2024-05-29

## 2024-05-28 RX ORDER — PROCHLORPERAZINE 25 MG/1
25 SUPPOSITORY RECTAL EVERY 12 HOURS PRN
Status: DISCONTINUED | OUTPATIENT
Start: 2024-05-28 | End: 2024-05-29 | Stop reason: HOSPADM

## 2024-05-28 RX ORDER — DAPAGLIFLOZIN 5 MG/1
5 TABLET, FILM COATED ORAL DAILY
Status: DISCONTINUED | OUTPATIENT
Start: 2024-05-29 | End: 2024-05-29 | Stop reason: HOSPADM

## 2024-05-28 RX ORDER — DOCUSATE SODIUM 100 MG/1
100 CAPSULE, LIQUID FILLED ORAL 2 TIMES DAILY
Status: DISCONTINUED | OUTPATIENT
Start: 2024-05-28 | End: 2024-05-29 | Stop reason: HOSPADM

## 2024-05-28 RX ORDER — FENTANYL CITRATE 50 UG/ML
25 INJECTION, SOLUTION INTRAMUSCULAR; INTRAVENOUS EVERY 5 MIN PRN
Status: DISCONTINUED | OUTPATIENT
Start: 2024-05-28 | End: 2024-05-28 | Stop reason: HOSPADM

## 2024-05-28 RX ORDER — TRANEXAMIC ACID 650 MG/1
1950 TABLET ORAL ONCE
Status: COMPLETED | OUTPATIENT
Start: 2024-05-28 | End: 2024-05-28

## 2024-05-28 RX ORDER — MEPERIDINE HYDROCHLORIDE 25 MG/ML
12.5 INJECTION INTRAMUSCULAR; INTRAVENOUS; SUBCUTANEOUS EVERY 10 MIN PRN
Status: DISCONTINUED | OUTPATIENT
Start: 2024-05-28 | End: 2024-05-28 | Stop reason: HOSPADM

## 2024-05-28 RX ORDER — ONDANSETRON 4 MG/1
4 TABLET, FILM COATED ORAL EVERY 8 HOURS PRN
Status: DISCONTINUED | OUTPATIENT
Start: 2024-05-28 | End: 2024-05-29 | Stop reason: HOSPADM

## 2024-05-28 RX ORDER — ONDANSETRON HYDROCHLORIDE 2 MG/ML
INJECTION, SOLUTION INTRAVENOUS AS NEEDED
Status: DISCONTINUED | OUTPATIENT
Start: 2024-05-28 | End: 2024-05-28

## 2024-05-28 RX ORDER — MIDAZOLAM HYDROCHLORIDE 1 MG/ML
INJECTION, SOLUTION INTRAMUSCULAR; INTRAVENOUS AS NEEDED
Status: DISCONTINUED | OUTPATIENT
Start: 2024-05-28 | End: 2024-05-28

## 2024-05-28 RX ORDER — DIPHENHYDRAMINE HYDROCHLORIDE 50 MG/ML
12.5 INJECTION INTRAMUSCULAR; INTRAVENOUS ONCE AS NEEDED
Status: DISCONTINUED | OUTPATIENT
Start: 2024-05-28 | End: 2024-05-28 | Stop reason: HOSPADM

## 2024-05-28 RX ORDER — FENTANYL CITRATE 50 UG/ML
INJECTION, SOLUTION INTRAMUSCULAR; INTRAVENOUS AS NEEDED
Status: DISCONTINUED | OUTPATIENT
Start: 2024-05-28 | End: 2024-05-28

## 2024-05-28 RX ORDER — ONDANSETRON HYDROCHLORIDE 2 MG/ML
4 INJECTION, SOLUTION INTRAVENOUS EVERY 8 HOURS PRN
Status: DISCONTINUED | OUTPATIENT
Start: 2024-05-28 | End: 2024-05-29 | Stop reason: HOSPADM

## 2024-05-28 RX ORDER — HYDRALAZINE HYDROCHLORIDE 20 MG/ML
5 INJECTION INTRAMUSCULAR; INTRAVENOUS EVERY 30 MIN PRN
Status: DISCONTINUED | OUTPATIENT
Start: 2024-05-28 | End: 2024-05-28 | Stop reason: HOSPADM

## 2024-05-28 RX ORDER — CELECOXIB 200 MG/1
200 CAPSULE ORAL ONCE
Status: COMPLETED | OUTPATIENT
Start: 2024-05-28 | End: 2024-05-28

## 2024-05-28 RX ORDER — LABETALOL HYDROCHLORIDE 5 MG/ML
5 INJECTION, SOLUTION INTRAVENOUS ONCE AS NEEDED
Status: DISCONTINUED | OUTPATIENT
Start: 2024-05-28 | End: 2024-05-28 | Stop reason: HOSPADM

## 2024-05-28 RX ORDER — LIDOCAINE HYDROCHLORIDE 20 MG/ML
INJECTION, SOLUTION INFILTRATION; PERINEURAL AS NEEDED
Status: DISCONTINUED | OUTPATIENT
Start: 2024-05-28 | End: 2024-05-28

## 2024-05-28 RX ORDER — ACETAMINOPHEN 325 MG/1
975 TABLET ORAL ONCE
Status: COMPLETED | OUTPATIENT
Start: 2024-05-28 | End: 2024-05-28

## 2024-05-28 RX ORDER — NALOXONE HYDROCHLORIDE 1 MG/ML
0.2 INJECTION INTRAMUSCULAR; INTRAVENOUS; SUBCUTANEOUS EVERY 5 MIN PRN
Status: DISCONTINUED | OUTPATIENT
Start: 2024-05-28 | End: 2024-05-29 | Stop reason: HOSPADM

## 2024-05-28 RX ORDER — NALOXONE HYDROCHLORIDE 1 MG/ML
0.2 INJECTION INTRAMUSCULAR; INTRAVENOUS; SUBCUTANEOUS EVERY 5 MIN PRN
Status: DISCONTINUED | OUTPATIENT
Start: 2024-05-28 | End: 2024-05-28

## 2024-05-28 RX ORDER — OXYCODONE HYDROCHLORIDE 5 MG/1
10 TABLET ORAL EVERY 4 HOURS PRN
Status: CANCELLED | OUTPATIENT
Start: 2024-05-28

## 2024-05-28 RX ORDER — DEXTROSE 50 % IN WATER (D50W) INTRAVENOUS SYRINGE
12.5
Status: DISCONTINUED | OUTPATIENT
Start: 2024-05-28 | End: 2024-05-29 | Stop reason: HOSPADM

## 2024-05-28 RX ORDER — CEFAZOLIN SODIUM 2 G/100ML
2 INJECTION, SOLUTION INTRAVENOUS EVERY 8 HOURS
Qty: 200 ML | Refills: 0 | Status: COMPLETED | OUTPATIENT
Start: 2024-05-28 | End: 2024-05-29

## 2024-05-28 RX ORDER — SODIUM CHLORIDE, SODIUM LACTATE, POTASSIUM CHLORIDE, CALCIUM CHLORIDE 600; 310; 30; 20 MG/100ML; MG/100ML; MG/100ML; MG/100ML
100 INJECTION, SOLUTION INTRAVENOUS CONTINUOUS
Status: DISCONTINUED | OUTPATIENT
Start: 2024-05-28 | End: 2024-05-28 | Stop reason: HOSPADM

## 2024-05-28 RX ORDER — DEXTROSE 50 % IN WATER (D50W) INTRAVENOUS SYRINGE
25
Status: DISCONTINUED | OUTPATIENT
Start: 2024-05-28 | End: 2024-05-29 | Stop reason: HOSPADM

## 2024-05-28 RX ORDER — ALBUTEROL SULFATE 0.83 MG/ML
2.5 SOLUTION RESPIRATORY (INHALATION) ONCE AS NEEDED
Status: DISCONTINUED | OUTPATIENT
Start: 2024-05-28 | End: 2024-05-28 | Stop reason: HOSPADM

## 2024-05-28 RX ORDER — CYCLOBENZAPRINE HCL 10 MG
10 TABLET ORAL 3 TIMES DAILY PRN
Status: DISCONTINUED | OUTPATIENT
Start: 2024-05-28 | End: 2024-05-29 | Stop reason: HOSPADM

## 2024-05-28 RX ORDER — ROCURONIUM BROMIDE 10 MG/ML
INJECTION, SOLUTION INTRAVENOUS AS NEEDED
Status: DISCONTINUED | OUTPATIENT
Start: 2024-05-28 | End: 2024-05-28

## 2024-05-28 RX ORDER — LISINOPRIL 5 MG/1
2.5 TABLET ORAL DAILY
Status: DISCONTINUED | OUTPATIENT
Start: 2024-05-28 | End: 2024-05-29 | Stop reason: HOSPADM

## 2024-05-28 RX ORDER — TAMSULOSIN HYDROCHLORIDE 0.4 MG/1
0.4 CAPSULE ORAL DAILY
Status: DISCONTINUED | OUTPATIENT
Start: 2024-05-28 | End: 2024-05-29 | Stop reason: HOSPADM

## 2024-05-28 RX ORDER — GABAPENTIN 300 MG/1
300 CAPSULE ORAL ONCE
Status: COMPLETED | OUTPATIENT
Start: 2024-05-28 | End: 2024-05-28

## 2024-05-28 RX ORDER — GLIPIZIDE 5 MG/1
10 TABLET ORAL
Status: DISCONTINUED | OUTPATIENT
Start: 2024-05-28 | End: 2024-05-29 | Stop reason: HOSPADM

## 2024-05-28 RX ORDER — FENOFIBRATE 160 MG/1
160 TABLET ORAL NIGHTLY
Status: DISCONTINUED | OUTPATIENT
Start: 2024-05-28 | End: 2024-05-29 | Stop reason: HOSPADM

## 2024-05-28 RX ORDER — BISACODYL 5 MG
10 TABLET, DELAYED RELEASE (ENTERIC COATED) ORAL DAILY PRN
Status: DISCONTINUED | OUTPATIENT
Start: 2024-05-28 | End: 2024-05-29 | Stop reason: HOSPADM

## 2024-05-28 RX ORDER — INSULIN LISPRO 100 [IU]/ML
0-5 INJECTION, SOLUTION INTRAVENOUS; SUBCUTANEOUS
Status: DISCONTINUED | OUTPATIENT
Start: 2024-05-28 | End: 2024-05-29 | Stop reason: HOSPADM

## 2024-05-28 RX ORDER — OXYCODONE HYDROCHLORIDE 5 MG/1
10 TABLET ORAL EVERY 4 HOURS PRN
Status: DISCONTINUED | OUTPATIENT
Start: 2024-05-28 | End: 2024-05-29 | Stop reason: HOSPADM

## 2024-05-28 RX ORDER — ACETAMINOPHEN 325 MG/1
650 TABLET ORAL EVERY 6 HOURS SCHEDULED
Status: DISCONTINUED | OUTPATIENT
Start: 2024-05-28 | End: 2024-05-29 | Stop reason: HOSPADM

## 2024-05-28 RX ORDER — ALLOPURINOL 300 MG/1
300 TABLET ORAL DAILY
Status: DISCONTINUED | OUTPATIENT
Start: 2024-05-28 | End: 2024-05-29 | Stop reason: HOSPADM

## 2024-05-28 RX ORDER — PRAVASTATIN SODIUM 20 MG/1
80 TABLET ORAL NIGHTLY
Status: DISCONTINUED | OUTPATIENT
Start: 2024-05-28 | End: 2024-05-29 | Stop reason: HOSPADM

## 2024-05-28 RX ORDER — SUCCINYLCHOLINE CHLORIDE 100 MG/5ML
SYRINGE (ML) INTRAVENOUS AS NEEDED
Status: DISCONTINUED | OUTPATIENT
Start: 2024-05-28 | End: 2024-05-28

## 2024-05-28 RX ORDER — PHENYLEPHRINE HCL IN 0.9% NACL 1 MG/10 ML
SYRINGE (ML) INTRAVENOUS AS NEEDED
Status: DISCONTINUED | OUTPATIENT
Start: 2024-05-28 | End: 2024-05-28

## 2024-05-28 RX ORDER — OXYCODONE HYDROCHLORIDE 5 MG/1
5 TABLET ORAL EVERY 4 HOURS PRN
Status: CANCELLED | OUTPATIENT
Start: 2024-05-28

## 2024-05-28 RX ADMIN — FENTANYL CITRATE 50 MCG: 50 INJECTION, SOLUTION INTRAMUSCULAR; INTRAVENOUS at 11:48

## 2024-05-28 RX ADMIN — DOCUSATE SODIUM 100 MG: 100 CAPSULE, LIQUID FILLED ORAL at 21:22

## 2024-05-28 RX ADMIN — TRANEXAMIC ACID 1950 MG: 650 TABLET ORAL at 08:28

## 2024-05-28 RX ADMIN — MIDAZOLAM 2 MG: 1 INJECTION INTRAMUSCULAR; INTRAVENOUS at 10:15

## 2024-05-28 RX ADMIN — GABAPENTIN 300 MG: 300 CAPSULE ORAL at 08:27

## 2024-05-28 RX ADMIN — LIDOCAINE HYDROCHLORIDE 60 MG: 20 INJECTION, SOLUTION INFILTRATION; PERINEURAL at 11:03

## 2024-05-28 RX ADMIN — ALLOPURINOL 300 MG: 300 TABLET ORAL at 14:37

## 2024-05-28 RX ADMIN — CEFAZOLIN SODIUM 2 G: 2 INJECTION, SOLUTION INTRAVENOUS at 19:08

## 2024-05-28 RX ADMIN — ROCURONIUM BROMIDE 60 MG: 10 SOLUTION INTRAVENOUS at 11:13

## 2024-05-28 RX ADMIN — OXYCODONE HYDROCHLORIDE 5 MG: 5 TABLET ORAL at 19:08

## 2024-05-28 RX ADMIN — FAMOTIDINE 20 MG: 10 INJECTION, SOLUTION INTRAVENOUS at 11:14

## 2024-05-28 RX ADMIN — SODIUM CHLORIDE, SODIUM LACTATE, POTASSIUM CHLORIDE, AND CALCIUM CHLORIDE 50 ML/HR: 600; 310; 30; 20 INJECTION, SOLUTION INTRAVENOUS at 08:28

## 2024-05-28 RX ADMIN — Medication 2 L/MIN: at 13:00

## 2024-05-28 RX ADMIN — DEXAMETHASONE SODIUM PHOSPHATE 4 MG: 4 INJECTION, SOLUTION INTRAMUSCULAR; INTRAVENOUS at 11:14

## 2024-05-28 RX ADMIN — SODIUM CHLORIDE, POTASSIUM CHLORIDE, SODIUM LACTATE AND CALCIUM CHLORIDE 50 ML/HR: 600; 310; 30; 20 INJECTION, SOLUTION INTRAVENOUS at 14:38

## 2024-05-28 RX ADMIN — INSULIN LISPRO 3 UNITS: 100 INJECTION, SOLUTION INTRAVENOUS; SUBCUTANEOUS at 21:22

## 2024-05-28 RX ADMIN — Medication 140 MG: at 11:03

## 2024-05-28 RX ADMIN — OXYCODONE HYDROCHLORIDE 5 MG: 5 TABLET ORAL at 13:17

## 2024-05-28 RX ADMIN — FENTANYL CITRATE 50 MCG: 50 INJECTION, SOLUTION INTRAMUSCULAR; INTRAVENOUS at 11:03

## 2024-05-28 RX ADMIN — PROPOFOL 150 MG: 10 INJECTION, EMULSION INTRAVENOUS at 11:03

## 2024-05-28 RX ADMIN — CEFAZOLIN SODIUM 2 G: 2 INJECTION, SOLUTION INTRAVENOUS at 11:09

## 2024-05-28 RX ADMIN — CELECOXIB 200 MG: 200 CAPSULE ORAL at 08:27

## 2024-05-28 RX ADMIN — Medication 100 MCG: at 11:20

## 2024-05-28 RX ADMIN — HYDROMORPHONE HYDROCHLORIDE 0.5 MG: 1 INJECTION, SOLUTION INTRAMUSCULAR; INTRAVENOUS; SUBCUTANEOUS at 13:01

## 2024-05-28 RX ADMIN — HYDROMORPHONE HYDROCHLORIDE 0.5 MG: 1 INJECTION, SOLUTION INTRAMUSCULAR; INTRAVENOUS; SUBCUTANEOUS at 13:06

## 2024-05-28 RX ADMIN — POVIDONE-IODINE 1 APPLICATION: 5 SOLUTION TOPICAL at 08:28

## 2024-05-28 RX ADMIN — ONDANSETRON 4 MG: 2 INJECTION, SOLUTION INTRAMUSCULAR; INTRAVENOUS at 11:03

## 2024-05-28 RX ADMIN — ACETAMINOPHEN 650 MG: 325 TABLET ORAL at 14:37

## 2024-05-28 RX ADMIN — TAMSULOSIN HYDROCHLORIDE 0.4 MG: 0.4 CAPSULE ORAL at 14:37

## 2024-05-28 RX ADMIN — DOCUSATE SODIUM 100 MG: 100 CAPSULE, LIQUID FILLED ORAL at 14:37

## 2024-05-28 RX ADMIN — ROPIVACAINE HYDROCHLORIDE: 5 INJECTION EPIDURAL; INFILTRATION; PERINEURAL at 10:24

## 2024-05-28 RX ADMIN — ROCURONIUM BROMIDE 10 MG: 10 SOLUTION INTRAVENOUS at 11:48

## 2024-05-28 RX ADMIN — ACETAMINOPHEN 650 MG: 325 TABLET ORAL at 21:22

## 2024-05-28 RX ADMIN — TRANEXAMIC ACID 1950 MG: 650 TABLET ORAL at 19:09

## 2024-05-28 RX ADMIN — SODIUM CHLORIDE, SODIUM LACTATE, POTASSIUM CHLORIDE, AND CALCIUM CHLORIDE: 600; 310; 30; 20 INJECTION, SOLUTION INTRAVENOUS at 12:29

## 2024-05-28 RX ADMIN — ACETAMINOPHEN 975 MG: 325 TABLET ORAL at 08:27

## 2024-05-28 RX ADMIN — INSULIN LISPRO 2 UNITS: 100 INJECTION, SOLUTION INTRAVENOUS; SUBCUTANEOUS at 17:04

## 2024-05-28 RX ADMIN — ROCURONIUM BROMIDE 10 MG: 10 SOLUTION INTRAVENOUS at 11:03

## 2024-05-28 RX ADMIN — PRAVASTATIN SODIUM 80 MG: 20 TABLET ORAL at 21:22

## 2024-05-28 RX ADMIN — FENOFIBRATE 160 MG: 160 TABLET ORAL at 21:22

## 2024-05-28 SDOH — SOCIAL STABILITY: SOCIAL INSECURITY: DO YOU FEEL ANYONE HAS EXPLOITED OR TAKEN ADVANTAGE OF YOU FINANCIALLY OR OF YOUR PERSONAL PROPERTY?: NO

## 2024-05-28 SDOH — SOCIAL STABILITY: SOCIAL INSECURITY: ARE YOU OR HAVE YOU BEEN THREATENED OR ABUSED PHYSICALLY, EMOTIONALLY, OR SEXUALLY BY ANYONE?: NO

## 2024-05-28 SDOH — SOCIAL STABILITY: SOCIAL INSECURITY: HAVE YOU HAD ANY THOUGHTS OF HARMING ANYONE ELSE?: NO

## 2024-05-28 SDOH — SOCIAL STABILITY: SOCIAL INSECURITY: DOES ANYONE TRY TO KEEP YOU FROM HAVING/CONTACTING OTHER FRIENDS OR DOING THINGS OUTSIDE YOUR HOME?: NO

## 2024-05-28 SDOH — SOCIAL STABILITY: SOCIAL INSECURITY: WERE YOU ABLE TO COMPLETE ALL THE BEHAVIORAL HEALTH SCREENINGS?: YES

## 2024-05-28 SDOH — SOCIAL STABILITY: SOCIAL INSECURITY: HAVE YOU HAD THOUGHTS OF HARMING ANYONE ELSE?: NO

## 2024-05-28 SDOH — SOCIAL STABILITY: SOCIAL INSECURITY: ABUSE: ADULT

## 2024-05-28 SDOH — SOCIAL STABILITY: SOCIAL INSECURITY: HAS ANYONE EVER THREATENED TO HURT YOUR FAMILY OR YOUR PETS?: NO

## 2024-05-28 SDOH — HEALTH STABILITY: MENTAL HEALTH: CURRENT SMOKER: 0

## 2024-05-28 SDOH — SOCIAL STABILITY: SOCIAL INSECURITY: ARE THERE ANY APPARENT SIGNS OF INJURIES/BEHAVIORS THAT COULD BE RELATED TO ABUSE/NEGLECT?: NO

## 2024-05-28 SDOH — SOCIAL STABILITY: SOCIAL INSECURITY: DO YOU FEEL UNSAFE GOING BACK TO THE PLACE WHERE YOU ARE LIVING?: NO

## 2024-05-28 ASSESSMENT — PAIN - FUNCTIONAL ASSESSMENT
PAIN_FUNCTIONAL_ASSESSMENT: 0-10

## 2024-05-28 ASSESSMENT — PAIN DESCRIPTION - DESCRIPTORS
DESCRIPTORS: ACHING
DESCRIPTORS: BURNING;SORE

## 2024-05-28 ASSESSMENT — PATIENT HEALTH QUESTIONNAIRE - PHQ9
SUM OF ALL RESPONSES TO PHQ9 QUESTIONS 1 & 2: 0
2. FEELING DOWN, DEPRESSED OR HOPELESS: NOT AT ALL
1. LITTLE INTEREST OR PLEASURE IN DOING THINGS: NOT AT ALL

## 2024-05-28 ASSESSMENT — COGNITIVE AND FUNCTIONAL STATUS - GENERAL
TOILETING: A LITTLE
WALKING IN HOSPITAL ROOM: A LITTLE
CLIMB 3 TO 5 STEPS WITH RAILING: A LITTLE
DRESSING REGULAR LOWER BODY CLOTHING: A LITTLE
PATIENT BASELINE BEDBOUND: NO
MOVING TO AND FROM BED TO CHAIR: A LITTLE
STANDING UP FROM CHAIR USING ARMS: A LITTLE
HELP NEEDED FOR BATHING: A LITTLE
MOVING TO AND FROM BED TO CHAIR: A LITTLE
MOBILITY SCORE: 18
TURNING FROM BACK TO SIDE WHILE IN FLAT BAD: A LITTLE
TOILETING: A LITTLE
MOBILITY SCORE: 18
DRESSING REGULAR UPPER BODY CLOTHING: A LITTLE
EATING MEALS: A LITTLE
DAILY ACTIVITIY SCORE: 18
DAILY ACTIVITIY SCORE: 18
PERSONAL GROOMING: A LITTLE
MOVING FROM LYING ON BACK TO SITTING ON SIDE OF FLAT BED WITH BEDRAILS: A LITTLE
HELP NEEDED FOR BATHING: A LITTLE
PERSONAL GROOMING: A LITTLE
WALKING IN HOSPITAL ROOM: A LITTLE
DRESSING REGULAR UPPER BODY CLOTHING: A LITTLE
MOVING FROM LYING ON BACK TO SITTING ON SIDE OF FLAT BED WITH BEDRAILS: A LITTLE
DRESSING REGULAR LOWER BODY CLOTHING: A LITTLE
CLIMB 3 TO 5 STEPS WITH RAILING: A LITTLE
EATING MEALS: A LITTLE
STANDING UP FROM CHAIR USING ARMS: A LITTLE
TURNING FROM BACK TO SIDE WHILE IN FLAT BAD: A LITTLE

## 2024-05-28 ASSESSMENT — ACTIVITIES OF DAILY LIVING (ADL)
BATHING: INDEPENDENT
WALKS IN HOME: INDEPENDENT
PATIENT'S MEMORY ADEQUATE TO SAFELY COMPLETE DAILY ACTIVITIES?: YES
LACK_OF_TRANSPORTATION: NO
ADEQUATE_TO_COMPLETE_ADL: YES
JUDGMENT_ADEQUATE_SAFELY_COMPLETE_DAILY_ACTIVITIES: YES
HEARING - RIGHT EAR: FUNCTIONAL
DRESSING YOURSELF: INDEPENDENT
HEARING - LEFT EAR: FUNCTIONAL
FEEDING YOURSELF: INDEPENDENT
TOILETING: INDEPENDENT
GROOMING: INDEPENDENT

## 2024-05-28 ASSESSMENT — PAIN SCALES - GENERAL
PAINLEVEL_OUTOF10: 6
PAINLEVEL_OUTOF10: 4
PAINLEVEL_OUTOF10: 0 - NO PAIN
PAIN_LEVEL: 1
PAINLEVEL_OUTOF10: 6

## 2024-05-28 ASSESSMENT — LIFESTYLE VARIABLES
HOW OFTEN DO YOU HAVE 6 OR MORE DRINKS ON ONE OCCASION: NEVER
HOW MANY STANDARD DRINKS CONTAINING ALCOHOL DO YOU HAVE ON A TYPICAL DAY: PATIENT DOES NOT DRINK
AUDIT-C TOTAL SCORE: 0
SKIP TO QUESTIONS 9-10: 1
HOW OFTEN DO YOU HAVE A DRINK CONTAINING ALCOHOL: NEVER
AUDIT-C TOTAL SCORE: 0

## 2024-05-28 ASSESSMENT — PAIN DESCRIPTION - ORIENTATION: ORIENTATION: LEFT

## 2024-05-28 ASSESSMENT — COLUMBIA-SUICIDE SEVERITY RATING SCALE - C-SSRS
2. HAVE YOU ACTUALLY HAD ANY THOUGHTS OF KILLING YOURSELF?: NO
6. HAVE YOU EVER DONE ANYTHING, STARTED TO DO ANYTHING, OR PREPARED TO DO ANYTHING TO END YOUR LIFE?: NO
1. IN THE PAST MONTH, HAVE YOU WISHED YOU WERE DEAD OR WISHED YOU COULD GO TO SLEEP AND NOT WAKE UP?: NO

## 2024-05-28 ASSESSMENT — PAIN DESCRIPTION - LOCATION: LOCATION: SHOULDER

## 2024-05-28 NOTE — CONSULTS
Consults    Reason For Consult  Diabetes     History Of Present Illness  Scottie Blandon is a 78 y.o. male presents to the orthopedics team with increased pain and decreased ability to perform ADLS due to left shoulder DJD. After failed conservative treatment, patient underwent surgery with no immediate post op complications, reports minimal pain to site described as dull in nature, mild in severity, responding well to pain meds. No other complaints. Hospitalist services were consulted for management of the patient's medical conditions post/op.  Patient examined and seen, resting comfortably. Denies chest pain, shortness of breath, abdominal pain, dizziness, fever or chills. Currently patient vital signs are stable. Plan of care was discussed with patient, verbalized understanding through teach back method. Hospitalist team will continue to follow until discharge.    Review of systems: 10 system were reviewed and were negative except what was mentioned in history of present illness         Past Medical History  He has a past medical history of Arthritis, BPH (benign prostatic hyperplasia), Chest pain (01/16/2023), Diverticulosis, GERD (gastroesophageal reflux disease), GI bleed (03/20/2023), Hyperlipidemia, Hypertension, Joint pain, Shortness of breath, Type 2 diabetes mellitus (Multi), Wears dentures, and Wears glasses. CAD, aortic stenosis    Surgical History  He has a past surgical history that includes Tonsillectomy; Hernia repair; Cardiac catheterization; Colonoscopy; Upper gastrointestinal endoscopy; and Cataract extraction.     Social History  He reports that he quit smoking about 15 years ago. His smoking use included cigarettes. He has never used smokeless tobacco. He reports current alcohol use. He reports current drug use. Drug: Marijuana.    Family History  Family History   Problem Relation Name Age of Onset    Colon cancer Father          Allergies  Atorvastatin and Rosuvastatin       Physical  Exam  Constitutional: Well developed, awake/alert/oriented x3, cooperative  Eyes: PERRL,  clear sclera  ENMT: mucous membranes moist, no apparent injury, no lesions seen  Head/Neck: Neck supple, no apparent injury,  Respiratory/Thorax: Patent airways,  normal breath sounds with good chest expansion, thorax symmetric  Cardiovascular: Regular, rate and rhythm, no murmurs, 2+ equal pulses of the extremities, normal S 1and S 2  Gastrointestinal: Nondistended, soft, non-tender,   Musculoskeletal: mild decrease range of motion to left shoulder s/p sx intervention, good capillary refills bilaterally, +2 pulses,   Extremities: normal extremities,  incision covered with Aquacel, CDI sling in place   Skin: warm, dry, intact  Neurological: alert/oriented x 3, speech clear  Psychiatric: appropriate mood and behavior         Last Recorded Vitals  /59 (Patient Position: Sitting)   Pulse 73   Temp 35.8 °C (96.4 °F)   Resp 17   Wt 92.5 kg (203 lb 14.8 oz)   SpO2 97%     Relevant Results  Scheduled medications  acetaminophen, 650 mg, oral, q6h LAYA  allopurinol, 300 mg, oral, Daily  ceFAZolin, 2 g, intravenous, q8h  [START ON 5/29/2024] dapagliflozin propanediol, 5 mg, oral, Daily  docusate sodium, 100 mg, oral, BID  fenofibrate, 160 mg, oral, Nightly  glipiZIDE, 10 mg, oral, BID AC  insulin lispro, 0-5 Units, subcutaneous, Before meals & nightly  [Held by provider] lisinopril, 2.5 mg, oral, Daily  [Held by provider] metFORMIN, 1,000 mg, oral, BID  [START ON 5/29/2024] pantoprazole, 20 mg, oral, Daily before breakfast  pravastatin, 80 mg, oral, Nightly  [START ON 5/29/2024] SITagliptin phosphate, 100 mg, oral, Daily  tamsulosin, 0.4 mg, oral, Daily  tranexamic acid, 1,950 mg, oral, Once  [START ON 5/29/2024] tranexamic acid, 1,950 mg, oral, Once      Continuous medications  lactated Ringer's, 50 mL/hr, Last Rate: 50 mL/hr (05/28/24 1229)  lactated Ringer's, 50 mL/hr, Last Rate: 50 mL/hr (05/28/24 9478)      PRN  medications  PRN medications: bisacodyl, cyclobenzaprine, dextrose, dextrose, glucagon, glucagon, HYDROmorphone, morphine, naloxone, naloxone, ondansetron **OR** ondansetron, oxyCODONE, oxyCODONE, oxyCODONE, prochlorperazine **OR** prochlorperazine **OR** prochlorperazine    Results for orders placed or performed during the hospital encounter of 05/28/24 (from the past 24 hour(s))   POCT GLUCOSE   Result Value Ref Range    POCT Glucose 160 (H) 74 - 99 mg/dL   POCT GLUCOSE   Result Value Ref Range    POCT Glucose 190 (H) 74 - 99 mg/dL          Assessment/Plan     # Left Reverse Shoulder Arthoplasty  Left Shoulder Pain   Admit to Orthopedics Team   Hospitalist team Consulted   DVTp and Pain management per Ortho   PT/OT evaluation  and treatment   CBC/BMP as appropriate, review results  Pulmonary Toileting   Follow up as needed outpatient with Orthopedics     # Diabetes Mellitus Type 2  Continue home medications  Diet Cardiac/Diabetic  Continue Sliding Scale SSI AC/HS   A1C 6.9  Encourage healthy lifestyle changes  Continue Januvia, Farixga, Glipizide - monitor for hypoglycemia  Hold Metformin  until discharge    # CAD / HTN / HLD  Continue statin   Hold Lisinopril at this time - continue at discharge  Telemetry for arrhythmia monitoring    Hemodynamically stable    #Gout  Continue home medications     #GERD  Continue PPI  Stay upright for 30minutes  Take pain medications with food    Thank you for consult  Medicine to continue to follow  Call for acute needs    Time spent  56  minutes obtaining labs, imaging, recommendations, interview, assessment, examination, medication review/ordering, and EMR review.    Plan of care was discussed extensively with patient, RN, wife and mother. Patient verbalized understanding through teach back method. All questions and concerns addressed upon examination.     Of note, this documentation is completed using the Dragon Dictation system (voice recognition software). There may be  spelling and/or grammatical errors that were not corrected prior to final submission.      Karena Lee, ALLY-CNP

## 2024-05-28 NOTE — CARE PLAN
Problem: Pain - Adult  Goal: Verbalizes/displays adequate comfort level or baseline comfort level  Outcome: Progressing     Problem: Safety - Adult  Goal: Free from fall injury  Outcome: Progressing     Problem: Discharge Planning  Goal: Discharge to home or other facility with appropriate resources  Outcome: Progressing     Problem: Chronic Conditions and Co-morbidities  Goal: Patient's chronic conditions and co-morbidity symptoms are monitored and maintained or improved  Outcome: Progressing     Problem: Diabetes  Goal: Achieve decreasing blood glucose levels by end of shift  Outcome: Progressing  Goal: Increase stability of blood glucose readings by end of shift  Outcome: Progressing  Goal: Decrease in ketones present in urine by end of shift  Outcome: Progressing  Goal: Maintain electrolyte levels within acceptable range throughout shift  Outcome: Progressing  Goal: Maintain glucose levels >70mg/dl to <250mg/dl throughout shift  Outcome: Progressing  Goal: No changes in neurological exam by end of shift  Outcome: Progressing  Goal: Learn about and adhere to nutrition recommendations by end of shift  Outcome: Progressing  Goal: Vital signs within normal range for age by end of shift  Outcome: Progressing  Goal: Increase self care and/or family involovement by end of shift  Outcome: Progressing  Goal: Receive DSME education by end of shift  Outcome: Progressing     Problem: Pain  Goal: Takes deep breaths with improved pain control throughout the shift  Outcome: Progressing  Goal: Turns in bed with improved pain control throughout the shift  Outcome: Progressing  Goal: Walks with improved pain control throughout the shift  Outcome: Progressing  Goal: Performs ADL's with improved pain control throughout shift  Outcome: Progressing  Goal: Participates in PT with improved pain control throughout the shift  Outcome: Progressing  Goal: Free from opioid side effects throughout the shift  Outcome: Progressing  Goal:  Free from acute confusion related to pain meds throughout the shift  Outcome: Progressing   The patient's goals for the shift include      The clinical goals for the shift include Pain management

## 2024-05-28 NOTE — ANESTHESIA PROCEDURE NOTES
Airway  Date/Time: 5/28/2024 11:05 AM  Urgency: elective    Airway not difficult    Staffing  Performed: CRNA   Authorized by: Dilan Eldridge DO    Performed by: ALLY Anders-CRNA  Patient location during procedure: OR    Indications and Patient Condition  Indications for airway management: anesthesia  Spontaneous ventilation: present  Sedation level: deep  Preoxygenated: yes  Patient position: sniffing  MILS maintained throughout  Mask difficulty assessment: 0 - not attempted    Final Airway Details  Final airway type: endotracheal airway      Successful airway: ETT  Cuffed: yes   Successful intubation technique: video laryngoscopy  Facilitating devices/methods: cricoid pressure  Endotracheal tube insertion site: oral  Blade size: #4  ETT size (mm): 7.5  Cormack-Lehane Classification: grade I - full view of glottis  Placement verified by: capnometry   Measured from: lips  ETT to lips (cm): 24  Number of attempts at approach: 1  Ventilation between attempts: none  Number of other approaches attempted: 0    Additional Comments  RSI with cricoid pressure

## 2024-05-28 NOTE — OP NOTE
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  Given the nature of the disease process and the procedure, a skilled surgical first assistant was necessary during the entire case.  The assistant was necessary to hold retractors and manipulate extremity during the procedure.  A certified scrub tech was also present at the back table managing instruments with supplies for the surgical case    Date: 2024  OR Location: [facility]    Name: Scottie Blandon, : 1945, Age: 78 y.o., MRN: [id number], Sex: male    Diagnosis  Pre-op Diagnosis     * Degenerative joint disease of left shoulder [M19.012]     * Complete rotator cuff tear or rupture of left shoulder, not specified as traumatic [M75.122] Post-op Diagnosis     * Degenerative joint disease of left shoulder [M19.012]     * Complete rotator cuff tear or rupture of left shoulder, not specified as traumatic [M75.122]     Procedures  Arthroplasty Reverse Shoulder  80536 - DC ARTHROPLASTY GLENOHUMERAL JOINT TOTAL SHOULDER      Surgeons      * Jose Guadalupe Menendez - Primary    Resident/Fellow/Other Assistant:  Surgeons and Role:     * Shawn Estrella PA-C - Assisting    Procedure Summary  Anesthesia: General  ASA: III  Anesthesia Staff: Anesthesiologist: Dilan Eldridge DO  CRNA: ALLY Anders-CRNA  Estimated Blood Loss: Less than 30 mL  Intra-op Medications:   Administrations occurring from 1030 to 1130 on 24:   Medication Name Total Dose   ceFAZolin in dextrose (iso-os) (Ancef) IVPB 2 g 2 g              Anesthesia Record               Intraprocedure I/O Totals          Intake    ceFAZolin in dextrose (iso-os) (Ancef) IVPB 2 g 100.00 mL    Total Intake 100 mL          Specimen: No  specimens collected     Staff:   Circulator: Nargis Tariq Person: Chantell Jaramillo Circulator: Josafat         Drains and/or Catheters: * None in log *    Tourniquet Times:         Implants:  Implants       Type Name Action Serial No.      Screw HUMERAL STEM, 16MM JOHNY X 130MM LGTH, TM REVERSE SHLDR SYS - [id number] Implanted      Joint BASE PLATE, MMTM RVS, 26MM, 15MM - [id number] Implanted      Screw SCREW, INVERSE/REVERSE, 4.5 X 42MM - [id number] Implanted      Screw SCREW, INVERSE/REVERSE, 4.5 X 42MM - [id number] Implanted       40MM TRABECULAR METAL REVERSE PLUS GLENOSPHERE, +5MM LATERAL OFFSET Implanted      Joint POLY LINER, PLUS 6MM OFFSET, 40MM JOHNY, REVERSE SHOULDER SYSTEM - [id number] Implanted               Findings: see procedure details    Indications: Scottie Blandon is an 78 y.o. male who is having surgery for Degenerative joint disease of left shoulder [M19.012]  Complete rotator cuff tear or rupture of left shoulder, not specified as traumatic [M75.122].     The patient was seen in the preoperative area. The risks, benefits, complications, treatment options, non-operative alternatives, expected recovery and outcomes were discussed with the patient. The possibilities of reaction to medication, pulmonary aspiration, injury to surrounding structures, bleeding, recurrent infection, the need for additional procedures, failure to diagnose a condition, and creating a complication requiring transfusion or operation were discussed with the patient. The patient concurred with the proposed plan, giving informed consent.  The site of surgery was properly noted/marked if necessary per policy. The patient has been actively warmed in preoperative area. Preoperative antibiotics have been ordered and given within 1 hours of incision. Venous thrombosis prophylaxis have been ordered.    Procedure Details:   The patient was placed in the beachchair position and underwent routine prep and drape.    Standard deltopectoral  approach was used splitting skin for roughly 8-10 cm starting at the coracoid and extending down the arm.  Subcutaneous flaps were created and we exposed the deltopectoral interval.  Cephalic vein and deltoid were taken superior laterally and the pectoralis major released inferiorly.  We released part of the pectoralis off the humerus to aid with exposure.  We bluntly dissected under the conjoined tendon and placed a retractor medially against the conjoined tendon to protect the neurovascular structures.  Laterally we retracted off the deltoid attacking the vein.    The subscapularis partly intact.  The inferior 50% was peeled off the lesser tuberosity.  It was tagged with a #2 FiberWire.  At the end of the case it was repaired through Soheila in the prosthesis.  The majority of the superior subscap and the entire supra and infraspinatus were completely absent.  The humeral head was articulating against the acromion and was smooth and denuded of all cartilage.  There was severe arthritis.  We proceeded with reverse total shoulder replacement      The long head of the biceps tendon noticeably absent    The humeral head was easily dislocated at this time with external rotation.  The osteophytes were removed circumferentially around the humerus and we began hand reaming the humerus to a final diameter of 16 mm.    Using the appropriate cutting guide the humeral head was cut at 20° retroversion and further osteophytes were cleaned removed posteriorly to aid in the exposure and implant insertion.    At this time retractors were placed on the posteriorly,  superiorly,  and anteriorly along the glenoid rim to allow for complete exposure to the glenoid face.   We then cleaned and removed degenerative labral tissue and old bicep tissue from around the rim of the glenoid to further provide exposure to the glenoid.     A centering pin was placed in the glenoid to correct version, a centering hole was then created and we planed  "the glenoid to a smooth flush surface.  After final preparation the baseplate was inserted with a 15 mm stem.  The purchase was excellent and we proceeded to fill the baseplate with 2 bicortical screw 42 mm and 42 mm  in length, and then placed  \"end caps\" on the screws so as to convert them to locking screws.  A 40 mm glenosphere with +5 mm of lateral offset was fixed on the baseplate and its locking mechanism was also confirmed.    At this time final preparation of the humeral stem was completed and we inserted the 16 mm reverse compatible trabecular metal stem at 20° retroversion at the appropriate height and inclination.    We then trialed liners and after multiple reductions we placed a final buildup of 6 mm with a 60 degree constraint radius.    Once we completed the reduction of the shoulder, it was stable in all planes checked with no subcoracoid or subacromial impingement.  We lavaged and  irrigated and we closed what remnant of the subscapularis remained to  further enhance  stability.  Final  lavaging and irrigaion throughout the entire wound was completed and  hemostasis was confirmed.      We allow the deltopectoral interval to close loosely with 2-0 Vicryl, closure of the subcutaneous tissue with 2-0 Vicryl and a running 4-0 Monocryl was completed followed by application of a compressive dressing.  A drain was used if needed and the patient was taken to recovery room and dictation      Complications:  None; patient tolerated the procedure well.    Disposition: PACU - hemodynamically stable.  Condition: stable         Jose Guadalupe Menendez  Phone Number: [phone]      "

## 2024-05-28 NOTE — DISCHARGE INSTRUCTIONS
Total Shoulder Replacement  Discharge Instructions    Surgical Site Care:  Change dressing once a day and PRN (as needed). Apply 4 x 4 sponge and light tape. If glue present, leave open to air.  You may leave wound open to air after initial dressing removal, if wound is clean, dry and intact  If Aquacel Ag dressing is present, do not remove dressing for 7 days, unless heavily saturated. If heavily saturated, remove dressing and start using instructions above  Staples will be removed on post-operative day 14 and steri-strips applied  Showering is permitted starting POD1 if waterproof Aquacel dressing is present or when the incision is covered with 4 x 4 and Tegaderm waterproof dressing  Until all areas of incision are healed.    Physical Therapy:  Weight Bearing Status:  NWB ( none weight bearing)  No ROM of Shoulder  Active and passive range of motion of elbow, wrist, hand  Per Physical Therapy handout  Sling to be applied at all times when out of bed. Ok to remove sling for hygiene and when awake in bed with support    Pain Medications  You were given  oxycodone  Wean off pain medications as you deem appropriate as long as pain is under control  Do not exceed 4,000 mg of acetaminophen from all sources in a 24 hour period  Contact the [facility] if you notice any reactions to the medication or need a refill    Cold packs/Ice packs/Machine  May be used 5 times daily for 15-30 minutes as necessary  Be sure to have a barrier (cloth, clothing, towel) between the site and the ice pack to prevent frostbite    Contact the [facility] office if  Increased redness, swelling, drainage of any kind, and/or pain to surgery site.  As well as new onset fevers and or chills.  These could signify an infection.  Arm tenderness to touch as well as increased swelling or redness.  This could signify a clot formation.  Numbness or tingling to an area around the incision site or below the incision site  (fingers).  Any rash appears, increased  or new onset nausea/vomiting occur.  This may indicate a reaction to a medication.  Phone # [phone].  Follow up with Surgeon in 2 weeks

## 2024-05-28 NOTE — ANESTHESIA POSTPROCEDURE EVALUATION
Patient: Scottie Blandon    Procedure Summary       Date: 05/28/24 Room / Location: [facility] / [facility]    Anesthesia Start: 1058 Anesthesia Stop: 1249    Procedure: Arthroplasty Reverse Shoulder (Left: Shoulder) Diagnosis:       Degenerative joint disease of left shoulder      Complete rotator cuff tear or rupture of left shoulder, not specified as traumatic      (Degenerative joint disease of left shoulder [M19.012])      (Complete rotator cuff tear or rupture of left shoulder, not specified as traumatic [M75.122])    Surgeons: Jose Guadalupe Menendez MD Responsible Provider: Dilan Eldridge DO    Anesthesia Type: general, regional ASA Status: 3            Anesthesia Type: general, regional    Vitals Value Taken Time   /67 05/28/24 1245   Temp 36.3 05/28/24 1249   Pulse 84 05/28/24 1248   Resp 20 05/28/24 1248   SpO2 99 % 05/28/24 1248   Vitals shown include unfiled device data.    Anesthesia Post Evaluation    Patient location during evaluation: bedside  Patient participation: complete - patient participated  Level of consciousness: awake  Pain score: 1  Pain management: adequate  Airway patency: patent  Cardiovascular status: acceptable  Respiratory status: acceptable  Hydration status: acceptable  Postoperative Nausea and Vomiting: none        There were no known notable events for this encounter.

## 2024-05-28 NOTE — ANESTHESIA PROCEDURE NOTES
Peripheral Block    Patient location during procedure: pre-op  Start time: 5/28/2024 10:15 AM  End time: 5/28/2024 10:22 AM  Reason for block: at surgeon's request and post-op pain management  Staffing  Performed: attending   Authorized by: Dilan Eldridge DO    Performed by: Dilan Eldridge DO  Preanesthetic Checklist  Completed: patient identified, IV checked, site marked, risks and benefits discussed, surgical consent, monitors and equipment checked, pre-op evaluation and timeout performed   Timeout performed at: 5/28/2024 10:15 AM  Peripheral Block  Patient position: laying flat  Prep: ChloraPrep  Patient monitoring: heart rate, cardiac monitor and continuous pulse ox  Block type: supraclavicular  Laterality: left  Injection technique: single-shot  Guidance: ultrasound guided  Local infiltration: lidocaine  Infiltration strength: 1 %  Dose: 5 mL  Needle  Needle gauge: 22 G  Needle length: 5 cm  Needle localization: anatomical landmarks and ultrasound guidance  Assessment  Injection assessment: negative aspiration for heme, no paresthesia on injection, incremental injection and local visualized surrounding nerve on ultrasound  Paresthesia pain: none  Heart rate change: no  Slow fractionated injection: yes  Additional Notes  Supraclavicular nerve block performed for post-op analgesia. 2 mg IV versed given for procedural sedation. Sterile prep. 20 mL of 0.5% ropivacaine with decadron 5 mg given in divided doses. Negative aspiration for heme every 5 mL. No pain or paresthesias with injection. Patient tolerated the procedure well without complication

## 2024-05-28 NOTE — ANESTHESIA PREPROCEDURE EVALUATION
Scottie Blandon is a 78 y.o. male here for:    Arthroplasty Reverse Shoulder  With Jose Guadalupe Menendez MD  Pre-Op Diagnosis Codes:     * Degenerative joint disease of left shoulder [M19.012]     * Complete rotator cuff tear or rupture of left shoulder, not specified as traumatic [M75.122]    Relevant Problems   Cardiac  EF 55% on echo 1/2023   (+) Abdominal aortic aneurysm, without rupture, unspecified (CMS-HCC)   (+) CAD (coronary artery disease)   (+) Essential hypertension   (+) Left anterior hemiblock   (+) Mild aortic stenosis   (+) Mixed hyperlipidemia   (+) Right bundle branch block (RBBB) on electrocardiography      Neuro   (+) Cervical radiculopathy      GI   (+) Gastroesophageal reflux disease without esophagitis   (+) Rectal bleeding      /Renal   (+) Benign prostatic hyperplasia with urinary obstruction      Endocrine   (+) Type 2 diabetes mellitus without complication, without long-term current use of insulin (Multi)      Hematology   (+) Acute posthemorrhagic anemia   (+) Anemia      Musculoskeletal   (+) Degenerative joint disease of left shoulder       Lab Results   Component Value Date    HGB 11.4 (L) 05/14/2024    HCT 35.1 (L) 05/14/2024    WBC 5.8 05/14/2024     05/14/2024     05/14/2024    K 4.2 05/14/2024     05/14/2024    CREATININE 0.95 05/14/2024    BUN 13 05/14/2024       Social History     Tobacco Use   Smoking Status Former    Current packs/day: 0.00    Types: Cigarettes    Quit date: 2009    Years since quitting: 15.4   Smokeless Tobacco Never       Allergies   Allergen Reactions    Atorvastatin Unknown    Rosuvastatin Unknown       Current Outpatient Medications   Medication Instructions    allopurinol (ZYLOPRIM) 300 mg, oral, Daily    aspirin 81 mg EC tablet 1 tablet, oral, Daily    blood sugar diagnostic (Blood Glucose Test) strip 3 times daily    celecoxib (CELEBREX) 200 mg, oral, Daily    Farxiga 5 mg, oral, Daily    fenofibrate (TRIGLIDE) 160 mg, oral, Daily     ferrous sulfate 324 mg (65 mg iron) EC tablet 1 tablet, oral, Daily with breakfast    glipiZIDE (GLUCOTROL) 10 mg, oral, 2 times daily before meals    ibuprofen 200 mg, oral, Every 6 hours PRN    Januvia 100 mg, oral, Daily    lancets 30 gauge misc miscellaneous, 3 times daily    lisinopril 2.5 mg, oral, Daily    metFORMIN (GLUCOPHAGE) 1,000 mg, oral, 2 times daily (morning and late afternoon)    naloxone (Narcan) 4 mg/0.1 mL nasal spray Instill 1 spray intranasally for opiod overdose; repeat in 5 minutes if no response.    NIACIN, NIACINAMIDE, ORAL 1 tablet, oral, 2 times daily    omeprazole (PRILOSEC) 20 mg, oral, Daily    pravastatin (PRAVACHOL) 80 mg, oral, Daily    tamsulosin (FLOMAX) 0.4 mg, oral, Daily       Past Surgical History:   Procedure Laterality Date    CARDIAC CATHETERIZATION      CATARACT EXTRACTION      COLONOSCOPY      HERNIA REPAIR      TONSILLECTOMY      UPPER GASTROINTESTINAL ENDOSCOPY         Family History   Problem Relation Name Age of Onset    Colon cancer Father         NPO Details:  NPO/Void Status  Carbohydrate Drink Given Prior to Surgery? : N  Date of Last Liquid: 05/27/24  Time of Last Liquid: 1700  Date of Last Solid: 05/27/24  Time of Last Solid: 1700  Last Intake Type: Light meal  Time of Last Void: 0800        Physical Exam    Airway  Mallampati: II  TM distance: >3 FB  Neck ROM: full     Cardiovascular - normal exam     Dental   (+) upper dentures, lower dentures     Pulmonary - normal exam     Abdominal            Anesthesia Plan    History of general anesthesia?: yes  History of complications of general anesthesia?: no    ASA 3     general and regional     The patient is not a current smoker.    intravenous induction   Postoperative administration of opioids is intended.  Trial extubation is planned.  Anesthetic plan and risks discussed with patient.    Plan discussed with CRNA.

## 2024-05-29 ENCOUNTER — APPOINTMENT (OUTPATIENT)
Dept: CARDIOLOGY | Facility: HOSPITAL | Age: 79
End: 2024-05-29
Payer: MEDICARE

## 2024-05-29 ENCOUNTER — PHARMACY VISIT (OUTPATIENT)
Dept: PHARMACY | Facility: CLINIC | Age: 79
End: 2024-05-29
Payer: COMMERCIAL

## 2024-05-29 VITALS
BODY MASS INDEX: 27.62 KG/M2 | HEIGHT: 72 IN | TEMPERATURE: 97.7 F | DIASTOLIC BLOOD PRESSURE: 66 MMHG | HEART RATE: 66 BPM | WEIGHT: 203.93 LBS | OXYGEN SATURATION: 96 % | RESPIRATION RATE: 16 BRPM | SYSTOLIC BLOOD PRESSURE: 140 MMHG

## 2024-05-29 DIAGNOSIS — Z98.890 HISTORY OF REVERSE TOTAL REPLACEMENT OF LEFT SHOULDER JOINT: Primary | ICD-10-CM

## 2024-05-29 PROBLEM — M75.122 COMPLETE ROTATOR CUFF TEAR OR RUPTURE OF LEFT SHOULDER, NOT SPECIFIED AS TRAUMATIC: Status: RESOLVED | Noted: 2024-05-28 | Resolved: 2024-05-29

## 2024-05-29 PROBLEM — M19.012 DEGENERATIVE JOINT DISEASE OF LEFT SHOULDER: Status: RESOLVED | Noted: 2024-05-28 | Resolved: 2024-05-29

## 2024-05-29 LAB
ANION GAP SERPL CALC-SCNC: 12 MMOL/L (ref 10–20)
BUN SERPL-MCNC: 21 MG/DL (ref 6–23)
CALCIUM SERPL-MCNC: 9.9 MG/DL (ref 8.6–10.3)
CHLORIDE SERPL-SCNC: 104 MMOL/L (ref 98–107)
CO2 SERPL-SCNC: 27 MMOL/L (ref 21–32)
CREAT SERPL-MCNC: 0.97 MG/DL (ref 0.5–1.3)
EGFRCR SERPLBLD CKD-EPI 2021: 81 ML/MIN/1.73M*2
ERYTHROCYTE [DISTWIDTH] IN BLOOD BY AUTOMATED COUNT: 12.9 % (ref 11.5–14.5)
GLUCOSE BLD MANUAL STRIP-MCNC: 158 MG/DL (ref 74–99)
GLUCOSE BLD MANUAL STRIP-MCNC: 159 MG/DL (ref 74–99)
GLUCOSE SERPL-MCNC: 159 MG/DL (ref 74–99)
HCT VFR BLD AUTO: 32.9 % (ref 41–52)
HGB BLD-MCNC: 10.3 G/DL (ref 13.5–17.5)
MCH RBC QN AUTO: 29.2 PG (ref 26–34)
MCHC RBC AUTO-ENTMCNC: 31.3 G/DL (ref 32–36)
MCV RBC AUTO: 93 FL (ref 80–100)
NRBC BLD-RTO: 0 /100 WBCS (ref 0–0)
PLATELET # BLD AUTO: 235 X10*3/UL (ref 150–450)
POTASSIUM SERPL-SCNC: 4 MMOL/L (ref 3.5–5.3)
RBC # BLD AUTO: 3.53 X10*6/UL (ref 4.5–5.9)
SODIUM SERPL-SCNC: 139 MMOL/L (ref 136–145)
WBC # BLD AUTO: 8 X10*3/UL (ref 4.4–11.3)

## 2024-05-29 PROCEDURE — 93005 ELECTROCARDIOGRAM TRACING: CPT

## 2024-05-29 PROCEDURE — 2500000002 HC RX 250 W HCPCS SELF ADMINISTERED DRUGS (ALT 637 FOR MEDICARE OP, ALT 636 FOR OP/ED): Performed by: ORTHOPAEDIC SURGERY

## 2024-05-29 PROCEDURE — 80048 BASIC METABOLIC PNL TOTAL CA: CPT | Performed by: ORTHOPAEDIC SURGERY

## 2024-05-29 PROCEDURE — 2500000002 HC RX 250 W HCPCS SELF ADMINISTERED DRUGS (ALT 637 FOR MEDICARE OP, ALT 636 FOR OP/ED): Performed by: REGISTERED NURSE

## 2024-05-29 PROCEDURE — 97165 OT EVAL LOW COMPLEX 30 MIN: CPT | Mod: GO

## 2024-05-29 PROCEDURE — 82947 ASSAY GLUCOSE BLOOD QUANT: CPT | Mod: 59

## 2024-05-29 PROCEDURE — 36415 COLL VENOUS BLD VENIPUNCTURE: CPT | Performed by: ORTHOPAEDIC SURGERY

## 2024-05-29 PROCEDURE — 93010 ELECTROCARDIOGRAM REPORT: CPT | Performed by: INTERNAL MEDICINE

## 2024-05-29 PROCEDURE — 99232 SBSQ HOSP IP/OBS MODERATE 35: CPT | Performed by: REGISTERED NURSE

## 2024-05-29 PROCEDURE — 2500000001 HC RX 250 WO HCPCS SELF ADMINISTERED DRUGS (ALT 637 FOR MEDICARE OP): Performed by: REGISTERED NURSE

## 2024-05-29 PROCEDURE — 85027 COMPLETE CBC AUTOMATED: CPT | Performed by: ORTHOPAEDIC SURGERY

## 2024-05-29 PROCEDURE — 2500000001 HC RX 250 WO HCPCS SELF ADMINISTERED DRUGS (ALT 637 FOR MEDICARE OP): Performed by: ORTHOPAEDIC SURGERY

## 2024-05-29 PROCEDURE — RXMED WILLOW AMBULATORY MEDICATION CHARGE

## 2024-05-29 PROCEDURE — 97530 THERAPEUTIC ACTIVITIES: CPT | Mod: GO

## 2024-05-29 PROCEDURE — 97161 PT EVAL LOW COMPLEX 20 MIN: CPT | Mod: GP | Performed by: PHYSICAL THERAPIST

## 2024-05-29 PROCEDURE — 7100000011 HC EXTENDED STAY RECOVERY HOURLY - NURSING UNIT

## 2024-05-29 PROCEDURE — 2500000004 HC RX 250 GENERAL PHARMACY W/ HCPCS (ALT 636 FOR OP/ED): Mod: JZ | Performed by: ORTHOPAEDIC SURGERY

## 2024-05-29 RX ORDER — ACETAMINOPHEN 325 MG/1
650 TABLET ORAL EVERY 6 HOURS SCHEDULED
Qty: 56 TABLET | Refills: 0 | Status: SHIPPED | OUTPATIENT
Start: 2024-05-29 | End: 2024-06-05

## 2024-05-29 RX ORDER — OXYCODONE HYDROCHLORIDE 5 MG/1
5 TABLET ORAL EVERY 6 HOURS PRN
Qty: 28 TABLET | Refills: 0 | Status: SHIPPED | OUTPATIENT
Start: 2024-05-29 | End: 2024-06-05

## 2024-05-29 RX ORDER — CYCLOBENZAPRINE HCL 5 MG
5 TABLET ORAL 3 TIMES DAILY PRN
Qty: 21 TABLET | Refills: 0 | Status: SHIPPED | OUTPATIENT
Start: 2024-05-29 | End: 2024-06-05

## 2024-05-29 RX ORDER — DOCUSATE SODIUM 100 MG/1
100 CAPSULE, LIQUID FILLED ORAL 2 TIMES DAILY
Qty: 20 CAPSULE | Refills: 0 | Status: SHIPPED | OUTPATIENT
Start: 2024-05-29 | End: 2024-06-08

## 2024-05-29 RX ADMIN — DAPAGLIFLOZIN 5 MG: 5 TABLET, FILM COATED ORAL at 08:54

## 2024-05-29 RX ADMIN — DOCUSATE SODIUM 100 MG: 100 CAPSULE, LIQUID FILLED ORAL at 08:54

## 2024-05-29 RX ADMIN — TAMSULOSIN HYDROCHLORIDE 0.4 MG: 0.4 CAPSULE ORAL at 08:54

## 2024-05-29 RX ADMIN — TRANEXAMIC ACID 1950 MG: 650 TABLET ORAL at 06:32

## 2024-05-29 RX ADMIN — ACETAMINOPHEN 650 MG: 325 TABLET ORAL at 08:54

## 2024-05-29 RX ADMIN — SITAGLIPTIN 100 MG: 100 TABLET, FILM COATED ORAL at 08:54

## 2024-05-29 RX ADMIN — ACETAMINOPHEN 650 MG: 325 TABLET ORAL at 03:23

## 2024-05-29 RX ADMIN — INSULIN LISPRO 1 UNITS: 100 INJECTION, SOLUTION INTRAVENOUS; SUBCUTANEOUS at 11:45

## 2024-05-29 RX ADMIN — ALLOPURINOL 300 MG: 300 TABLET ORAL at 08:54

## 2024-05-29 RX ADMIN — CEFAZOLIN SODIUM 2 G: 2 INJECTION, SOLUTION INTRAVENOUS at 03:23

## 2024-05-29 RX ADMIN — INSULIN LISPRO 1 UNITS: 100 INJECTION, SOLUTION INTRAVENOUS; SUBCUTANEOUS at 07:21

## 2024-05-29 RX ADMIN — PANTOPRAZOLE SODIUM 20 MG: 20 TABLET, DELAYED RELEASE ORAL at 06:32

## 2024-05-29 ASSESSMENT — COGNITIVE AND FUNCTIONAL STATUS - GENERAL
HELP NEEDED FOR BATHING: A LOT
DRESSING REGULAR LOWER BODY CLOTHING: A LOT
PERSONAL GROOMING: A LITTLE
MOBILITY SCORE: 24
MOBILITY SCORE: 24
DAILY ACTIVITIY SCORE: 18
DRESSING REGULAR UPPER BODY CLOTHING: A LITTLE
TOILETING: A LITTLE
EATING MEALS: A LITTLE
EATING MEALS: A LITTLE
HELP NEEDED FOR BATHING: A LITTLE
DRESSING REGULAR LOWER BODY CLOTHING: A LITTLE
DRESSING REGULAR UPPER BODY CLOTHING: A LITTLE
PERSONAL GROOMING: A LITTLE
DAILY ACTIVITIY SCORE: 16
TOILETING: A LITTLE

## 2024-05-29 ASSESSMENT — PAIN SCALES - GENERAL
PAINLEVEL_OUTOF10: 2
PAINLEVEL_OUTOF10: 0 - NO PAIN
PAINLEVEL_OUTOF10: 0 - NO PAIN

## 2024-05-29 ASSESSMENT — ACTIVITIES OF DAILY LIVING (ADL)
LACK_OF_TRANSPORTATION: NO
BATHING_ASSISTANCE: MODERATE
HOME_MANAGEMENT_TIME_ENTRY: 9

## 2024-05-29 ASSESSMENT — PAIN - FUNCTIONAL ASSESSMENT
PAIN_FUNCTIONAL_ASSESSMENT: 0-10
PAIN_FUNCTIONAL_ASSESSMENT: 0-10

## 2024-05-29 NOTE — CARE PLAN
Problem: Pain - Adult  Goal: Verbalizes/displays adequate comfort level or baseline comfort level  Outcome: Met     Problem: Safety - Adult  Goal: Free from fall injury  Outcome: Met     Problem: Discharge Planning  Goal: Discharge to home or other facility with appropriate resources  Outcome: Met     Problem: Chronic Conditions and Co-morbidities  Goal: Patient's chronic conditions and co-morbidity symptoms are monitored and maintained or improved  Outcome: Met     Problem: Diabetes  Goal: Achieve decreasing blood glucose levels by end of shift  Outcome: Met  Goal: Increase stability of blood glucose readings by end of shift  Outcome: Met  Goal: Decrease in ketones present in urine by end of shift  Outcome: Met  Goal: Maintain electrolyte levels within acceptable range throughout shift  Outcome: Met  Goal: Maintain glucose levels >70mg/dl to <250mg/dl throughout shift  Outcome: Met  Goal: No changes in neurological exam by end of shift  Outcome: Met  Goal: Learn about and adhere to nutrition recommendations by end of shift  Outcome: Met  Goal: Vital signs within normal range for age by end of shift  Outcome: Met  Goal: Increase self care and/or family involovement by end of shift  Outcome: Met  Goal: Receive DSME education by end of shift  Outcome: Met     Problem: Pain  Goal: Takes deep breaths with improved pain control throughout the shift  Outcome: Met  Goal: Turns in bed with improved pain control throughout the shift  Outcome: Met  Goal: Walks with improved pain control throughout the shift  Outcome: Met  Goal: Performs ADL's with improved pain control throughout shift  Outcome: Met  Goal: Participates in PT with improved pain control throughout the shift  Outcome: Met  Goal: Free from opioid side effects throughout the shift  Outcome: Met  Goal: Free from acute confusion related to pain meds throughout the shift  Outcome: Met   The patient's goals for the shift include      The clinical goals for the shift  include Pain management

## 2024-05-29 NOTE — PROGRESS NOTES
"Occupational Therapy    Evaluation    Patient Name: Scottie Blandon  MRN: [id number]  Today's Date: 5/29/2024  Time Calculation  Start Time: 1034  Stop Time: 1100  Time Calculation (min): 26 min    Assessment  IP OT Assessment  End of Session Patient Position: Up in chair, Alarm on (all needs in reach, cold pack to L shoulder)  Plan:  Treatment Interventions: ADL retraining, UE strengthening/ROM, Patient/family training, Equipment evaluation/education, Compensatory technique education  OT Frequency:  (OT eval and treatment with eval, then d/c OT)  OT Discharge Recommendations: No OT needed after discharge (No further therapy needs until pt. is appropriate for outpt. shoulder therapy)  OT - OK to Discharge: Yes (when medically appropriate)    Subjective   Current Problem:  1. Other secondary osteoarthritis of left shoulder        2. Complete tear of left rotator cuff, unspecified whether traumatic        3. History of reverse total replacement of shoulder joint  acetaminophen (Tylenol) 325 mg tablet    cyclobenzaprine (Flexeril) 5 mg tablet    docusate sodium (Colace) 100 mg capsule    oxyCODONE (Roxicodone) 5 mg immediate release tablet        General:  General  Reason for Referral: rTSA  Referred By: VINITA Menendez  Past Medical History Relevant to Rehab: Includes: dyslipidemia, HTN, OA, DM, CAD, BPH, GIB, diverticulosis, SOB, aortic stenosis, cardiac cath, prior smoker, marijuana use, gout.  Family/Caregiver Present: No  Prior to Session Communication: Bedside nurse  Patient Position Received:  (Pt. was walking out of bathroom independently when OT arrived.)  General Comment: L reverse TSA on 5/28/24 by Dr. VINITA Menendez.  Precautions:  UE Weight Bearing Status: Left Non-Weight Bearing  Precautions Comment: \"No active movement of operative shoulder. Patient to remain in sling at all times unless with therapy. Patient can come out of the sling for active range of motion of elbow, wrist, hand and fingers with therapy only. NWB " "to operative extremity. For anatomic total shoulder replacement, PROM and AAROM for external rotation not passed 30 degrees.\"  Vital Signs:     Pain:  Pain Assessment  Pain Assessment: 0-10  Pain Score: 0 - No pain  Pain Type:  (Pt. reports LUE stiffness, but not pain.  Pt. has not regained full sensation yet. Has numbness and tingling of L digits.  \"I feel like my hand is starting to wake up.\")    Objective   Cognition:  Overall Cognitive Status: Within Functional Limits  Orientation Level: Oriented X4           Home Living:  Home Living Comments: Pt. lives with spouse.  No steps to enter.  1st floor set up.  No AD use.  Independent with ADLs, IADLs, functional mobility.  No falls.  Drives. Has tub/shower, no seat or safety bar.  Pt. is retired.   Prior Function:  Hand Dominance: Right  IADL History:     ADL:  Eating Deficit: Setup  Grooming Assistance: Minimal  Bathing Assistance: Moderate  UE Dressing Assistance: Minimal  LE Dressing Assistance: Moderate  Toileting Assistance with Device: Minimal  Functional Assistance: Independent  ADL Comments: Educated on LUE precautions, sling management, shoulder packet, ford technique for ADL. OT recommended tub seat for safety. Ed on safe sleeping position, sling at all times except bathing, dressing and exercises. Completed L hand and wrist active flexion/extension x 10 reps each. L elbow AROM not completed, as pt. did not yet have sensation or motor function.      Transfers  Transfer:  (sit<>stand at chair and EOB, independent)      Sitting Balance:  Static Sitting Balance  Static Sitting-Comment/Number of Minutes: normal  Dynamic Sitting Balance  Dynamic Sitting-Comments: normal  Standing Balance:  Static Standing Balance  Static Standing-Comment/Number of Minutes: normal  Dynamic Standing Balance  Dynamic Standing-Comments: normal       Extremities: RUE   RUE : Within Functional Limits and LUE   LUE:  (L digit and wrist active flexion/extension WFL. L elbow " flexion/extension, not yet present.  L shoulder AROM not assessed and LUE strength not tested 2nd to precautions.)      Outcome Measures: [facility] Daily Activity  Putting on and taking off regular lower body clothing: A lot  Bathing (including washing, rinsing, drying): A lot  Putting on and taking off regular upper body clothing: A little  Toileting, which includes using toilet, bedpan or urinal: A little  Taking care of personal grooming such as brushing teeth: A little  Eating Meals: A little  Daily Activity - Total Score: 16      Education Documentation  Precautions, taught by Jacklyn Traore OT at 5/29/2024  1:07 PM.  Learner: Patient  Readiness: Acceptance  Method: Explanation, Demonstration  Response: Verbalizes Understanding    ADL Training, taught by Jacklyn Traore OT at 5/29/2024  1:07 PM.  Learner: Patient  Readiness: Acceptance  Method: Explanation, Demonstration  Response: Verbalizes Understanding    Education Comments  No comments found.      Goals:   Encounter Problems       Encounter Problems (Resolved)       OT Goals       Demonstrate understanding of LUE precautions, sling management and shoulder packet.  (Met)       Start:  05/29/24    Expected End:  05/29/24    Resolved:  05/29/24         Demonstrate understanding of ford-technique for ADL.  (Met)       Start:  05/29/24    Expected End:  05/29/24    Resolved:  05/29/24

## 2024-05-29 NOTE — PROGRESS NOTES
05/29/24 1008   Discharge Planning   Living Arrangements Spouse/significant other   Support Systems Spouse/significant other;Children   Assistance Needed None, PTA ind ADLS no AD, shares IADLS with wife, drives, retired, denies falls. Owns tub shower   Type of Residence Private residence  (1 level home)   Number of Stairs to Enter Residence 0   Number of Stairs Within Residence 0   Do you have animals or pets at home? No   Home or Post Acute Services None   Patient expects to be discharged to: Home   Does the patient need discharge transport arranged? No   Financial Resource Strain   How hard is it for you to pay for the very basics like food, housing, medical care, and heating? Not hard   Housing Stability   In the last 12 months, was there a time when you were not able to pay the mortgage or rent on time? N   In the last 12 months, how many places have you lived? 1   In the last 12 months, was there a time when you did not have a steady place to sleep or slept in a shelter (including now)? N   Transportation Needs   In the past 12 months, has lack of transportation kept you from medical appointments or from getting medications? no   In the past 12 months, has lack of transportation kept you from meetings, work, or from getting things needed for daily living? No     Pt is s/p Elective Left reverse total shoulder arthroplasty 5/28/24 with Dr. Jose Guadalupe Menendez. Pt has ultrasling on, pt is right handed. AMPA score is PT (24) OT (pending)- no further PT needs. Pt states discharge preference is home, wife able to assist, declines any home going needs. No DC needs identified at present time.

## 2024-05-29 NOTE — DISCHARGE SUMMARY
Discharge Diagnosis  History of reverse total replacement of shoulder joint    Issues Requiring Follow-Up  No acute issues requiring follow up  Normal two weeks postoperative follow up scheduled    Test Results Pending At Discharge  Pending Labs       No current pending labs.            Hospital Course   POD1 LTSA doing well postoperatively   No acute events overnight     Pertinent Physical Exam At Time of Discharge  Physical Exam  During morning rounds patient had decreased movement and sensation to hand however not uncommon with block. This sensation and movement did return during mid morning rounds at 0815 with care team. Patient has good sensation to operative extremity. Extremity NVI. Distal pulses intact.     Home Medications     Medication List      START taking these medications     acetaminophen 325 mg tablet; Commonly known as: Tylenol; Take 2 tablets   (650 mg) by mouth every 6 hours for 7 days.   cyclobenzaprine 5 mg tablet; Commonly known as: Flexeril; Take 1 tablet   (5 mg) by mouth 3 times a day as needed for muscle spasms for up to 7   days.   docusate sodium 100 mg capsule; Commonly known as: Colace; Take 1   capsule (100 mg) by mouth 2 times a day for 10 days.   oxyCODONE 5 mg immediate release tablet; Commonly known as: Roxicodone;   Take 1 tablet (5 mg) by mouth every 6 hours if needed for severe pain (7 -   10) or moderate pain (4 - 6) for up to 7 days.     CONTINUE taking these medications     allopurinol 300 mg tablet; Commonly known as: Zyloprim; Take 1 tablet by   mouth once daily.   aspirin 81 mg EC tablet   Blood Glucose Test strip; Generic drug: blood sugar diagnostic   celecoxib 200 mg capsule; Commonly known as: CeleBREX   Farxiga 5 mg; Generic drug: dapagliflozin propanediol; Take 1 tablet (5   mg) by mouth once daily.   fenofibrate 160 mg tablet; Commonly known as: Triglide; TAKE 1 TABLET   DAILY.   ferrous sulfate 324 mg (65 mg elemental iron) EC tablet (delayed   release)    glipiZIDE 10 mg tablet; Commonly known as: Glucotrol; Take 1 tablet (10   mg) by mouth 2 times a day before meals.   ibuprofen 200 mg tablet   Januvia 100 mg tablet; Generic drug: SITagliptin phosphate; Take 1   tablet (100 mg) by mouth once daily.   lancets 30 gauge misc   lisinopril 2.5 mg tablet; Take 1 tablet (2.5 mg) by mouth once daily.   metFORMIN 500 mg tablet; Commonly known as: Glucophage; Take 2 tablets   (1,000 mg) by mouth 2 times a day with meals.   naloxone 4 mg/0.1 mL nasal spray; Commonly known as: Narcan; Instill 1   spray intranasally for opiod overdose; repeat in 5 minutes if no response.   NIACIN (NIACINAMIDE) ORAL   omeprazole 20 mg DR capsule; Commonly known as: PriLOSEC; Take 1 capsule   (20 mg) by mouth once daily.   pravastatin 80 mg tablet; Commonly known as: Pravachol; TAKE 1 TABLET BY   MOUTH DAILY   tamsulosin 0.4 mg 24 hr capsule; Commonly known as: Flomax; Take 1   capsule (0.4 mg) by mouth once daily.       Outpatient Follow-Up  Future Appointments   Date Time Provider Department Center   6/12/2024 10:30 AM Jose Guadalupe Naylor PA-C [id number] [place]   7/25/2024  9:00 AM Roger Wakefield MD [id number] [place]   10/22/2024 12:00 PM PHARMACY [facility] [id number] [facility]   5/13/2025  9:15 AM JUANY [id number] ECHO/VASC 3 [id number] [facility] [place]   5/21/2025 10:15 AM Harley Weems MD [id number] [place]       Vicky Doshi, APRN-CNP

## 2024-05-29 NOTE — PROGRESS NOTES
Orthopedic Surgery Progress Note  Scottie Blandon  5/29/2024    Subjective:     Post-Operative Day # 1 Status Post left TSA /ORIF proximal humerus fracture    Systemic or Specific Complaints:No Complaints    Objective:     Visit Vitals  /74   Pulse 69   Temp 36.3 °C (97.3 °F)   Resp 16       Intake/Output Summary (Last 24 hours) at 5/29/2024 0744  Last data filed at 5/28/2024 2225  Gross per 24 hour   Intake 3070 ml   Output 500 ml   Net 2570 ml     DRAIN/TUBE OUTPUT:         General: alert and oriented, in no acute distress, appears stated age, and cooperative   Wound: no erythema, no edema, no drainage, and dressing CDI   Extremity: Sling on extremity.  Distal NVI   DVT Exam: No evidence of DVT seen on physical exam.     Data Review  Labs reviewed:     CBC:   Lab Results   Component Value Date    WBC 8.0 05/29/2024    WBC 5.8 05/14/2024    WBC 6.0 03/05/2024    HGB 10.3 (L) 05/29/2024    HGB 11.4 (L) 05/14/2024    HGB 14.5 03/05/2024       BMP:    Lab Results   Component Value Date     05/29/2024     05/14/2024     03/05/2024    K 4.0 05/29/2024    K 4.2 05/14/2024    K 4.1 03/05/2024     05/29/2024     05/14/2024     03/05/2024    CO2 27 05/29/2024    CO2 25 05/14/2024    CO2 26 03/05/2024    BUN 21 05/29/2024    BUN 13 05/14/2024    BUN 14 03/05/2024    CREATININE 0.97 05/29/2024    CREATININE 0.95 05/14/2024    CREATININE 0.86 03/05/2024          I&O  I/O last 3 completed shifts:  In: 3070 (33.2 mL/kg) [P.O.:800; I.V.:2070 (22.4 mL/kg); IV Piggyback:200]  Out: 500 (5.4 mL/kg) [Urine:500 (0.2 mL/kg/hr)]  Dosing Weight: 92.5 kg       Assessment:     Status Post left TSA /ORIF proximal humerus fracture, doing well     Patient reports some numbness to left upper extremity at this time. This is not uncommon from the block that patient received preoperatively. Will continue to monitor for return of sensation. The extremity is pink warm and dry with good distal pulses.      Plan:      1:  Discharge today, Return to Clinic: 2 weeks    2:  Continue Pain Control  3:  Physical therapy as per TSA protocol  4.   Discharge planning:Plans plans to return to home with no needs. SW and TCC following for discharge planning.     Vicky Doshi, APRN-CNP

## 2024-05-29 NOTE — PROGRESS NOTES
Scottie Blandon is a 78 y.o. male on day 0 of admission presenting with History of reverse total replacement of shoulder joint.      Subjective   Patient examined and seen. Alert and oriented x3, resting comfortably.  Patient denies chest pain, shortness of breath, palpitations, abdominal pain, fever or chills.  Patient is agreeable to go home when cleared.  Reports support system is intact.         Objective     Last Recorded Vitals  /66   Pulse 66   Temp 36.5 °C (97.7 °F)   Resp 16   Wt 92.5 kg (203 lb 14.8 oz)   SpO2 96%   Intake/Output last 3 Shifts:    Intake/Output Summary (Last 24 hours) at 5/29/2024 1228  Last data filed at 5/28/2024 2225  Gross per 24 hour   Intake 2970 ml   Output 500 ml   Net 2470 ml       Admission Weight  Weight: 92.5 kg (203 lb 14.8 oz) (05/28/24 0820)    Daily Weight  05/28/24 : 92.5 kg (203 lb 14.8 oz)    Image Results  ECG 12 lead  Normal sinus rhythm  Right bundle branch block  Abnormal ECG  When compared with ECG of 04-NOV-2022 10:41,  Previous ECG has undetermined rhythm, needs review  ST no longer depressed in Anterior leads  QT has shortened      Physical Exam  Constitutional: Well developed, awake/alert/oriented x3, cooperative  Respiratory/Thorax: Patent airways,  normal breath sounds  Cardiovascular: Regular, rate and rhythm, , normal S 1and S 2  Musculoskeletal: mild decrease range of motion to left shoulder s/p sx intervention, good capillary refills bilaterally, +2 pulses,   Extremities: normal extremities,  incision covered with Aquacel, CDI sling in place   Skin: warm, dry, intact  Neurological: alert/oriented x 3, speech clear    Relevant Results  Scheduled medications  acetaminophen, 650 mg, oral, q6h LAYA  allopurinol, 300 mg, oral, Daily  dapagliflozin propanediol, 5 mg, oral, Daily  docusate sodium, 100 mg, oral, BID  fenofibrate, 160 mg, oral, Nightly  [Held by provider] glipiZIDE, 10 mg, oral, BID AC  insulin lispro, 0-5 Units, subcutaneous, Before meals &  nightly  [Held by provider] lisinopril, 2.5 mg, oral, Daily  [Held by provider] metFORMIN, 1,000 mg, oral, BID  pantoprazole, 20 mg, oral, Daily before breakfast  pravastatin, 80 mg, oral, Nightly  SITagliptin phosphate, 100 mg, oral, Daily  tamsulosin, 0.4 mg, oral, Daily      Continuous medications  lactated Ringer's, 50 mL/hr, Last Rate: 50 mL/hr (05/28/24 2225)  lactated Ringer's, 50 mL/hr, Last Rate: 50 mL/hr (05/28/24 2225)      PRN medications  PRN medications: bisacodyl, cyclobenzaprine, dextrose, dextrose, glucagon, glucagon, HYDROmorphone, morphine, naloxone, naloxone, ondansetron **OR** ondansetron, oxyCODONE, oxyCODONE, oxyCODONE, prochlorperazine **OR** prochlorperazine **OR** prochlorperazine    Results for orders placed or performed during the hospital encounter of 05/28/24 (from the past 24 hour(s))   POCT GLUCOSE   Result Value Ref Range    POCT Glucose 190 (H) 74 - 99 mg/dL   POCT GLUCOSE   Result Value Ref Range    POCT Glucose 208 (H) 74 - 99 mg/dL   POCT GLUCOSE   Result Value Ref Range    POCT Glucose 280 (H) 74 - 99 mg/dL   Basic metabolic panel   Result Value Ref Range    Glucose 159 (H) 74 - 99 mg/dL    Sodium 139 136 - 145 mmol/L    Potassium 4.0 3.5 - 5.3 mmol/L    Chloride 104 98 - 107 mmol/L    Bicarbonate 27 21 - 32 mmol/L    Anion Gap 12 10 - 20 mmol/L    Urea Nitrogen 21 6 - 23 mg/dL    Creatinine 0.97 0.50 - 1.30 mg/dL    eGFR 81 >60 mL/min/1.73m*2    Calcium 9.9 8.6 - 10.3 mg/dL   CBC POD 1   Result Value Ref Range    WBC 8.0 4.4 - 11.3 x10*3/uL    nRBC 0.0 0.0 - 0.0 /100 WBCs    RBC 3.53 (L) 4.50 - 5.90 x10*6/uL    Hemoglobin 10.3 (L) 13.5 - 17.5 g/dL    Hematocrit 32.9 (L) 41.0 - 52.0 %    MCV 93 80 - 100 fL    MCH 29.2 26.0 - 34.0 pg    MCHC 31.3 (L) 32.0 - 36.0 g/dL    RDW 12.9 11.5 - 14.5 %    Platelets 235 150 - 450 x10*3/uL   POCT GLUCOSE   Result Value Ref Range    POCT Glucose 159 (H) 74 - 99 mg/dL   ECG 12 lead   Result Value Ref Range    Ventricular Rate 71 BPM    Atrial  Rate 71 BPM    VT Interval 198 ms    QRS Duration 128 ms    QT Interval 412 ms    QTC Calculation(Bazett) 447 ms    P Axis 43 degrees    R Axis 64 degrees    T Axis 5 degrees    QRS Count 12 beats    Q Onset 226 ms    P Onset 127 ms    P Offset 185 ms    T Offset 432 ms    QTC Fredericia 436 ms   POCT GLUCOSE   Result Value Ref Range    POCT Glucose 158 (H) 74 - 99 mg/dL            Assessment/Plan          # Left Reverse Shoulder Arthoplasty  Left Shoulder Pain   Admit to Orthopedics Team   Hospitalist team Consulted   DVTp and Pain management per Ortho   PT/OT evaluation  and treatment   CBC/BMP as appropriate, review results  Pulmonary Toileting   Follow up as needed outpatient with Orthopedics      # Diabetes Mellitus Type 2  Continue home medications  Diet Cardiac/Diabetic  Continue Sliding Scale SSI AC/HS   A1C 6.9  Encourage healthy lifestyle changes  Continue Januvia, Farixga, Glipizide - monitor for hypoglycemia  Hold Metformin  until discharge     # CAD / HTN / HLD  Continue statin   Hold Lisinopril at this time - continue at discharge  Telemetry for arrhythmia monitoring    Hemodynamically stable  ECG reviewed , no change      #Gout  Continue home medications      #GERD  Continue PPI  Stay upright for 30minutes  Take pain medications with food     Thank you for consult  Medicine to continue to follow  Call for acute needs     Time spent  36  minutes obtaining labs, imaging, recommendations, interview, assessment, examination, medication review/ordering, and EMR review.     Plan of care was discussed extensively with patient, RN, and Ortho NP. Patient verbalized understanding through teach back method. All questions and concerns addressed upon examination.      Of note, this documentation is completed using the Dragon Dictation system (voice recognition software). There may be spelling and/or grammatical errors that were not corrected prior to final submission.        Karena Lee, APRN-CNP            Karena eLe, APRN-CNP

## 2024-05-29 NOTE — PROGRESS NOTES
Physical Therapy    Physical Therapy Evaluation    Patient Name: Scottie Blandon  MRN: [id number]  Today's Date: 5/29/2024   Time Calculation  Start Time: 0725  Stop Time: 0740  Time Calculation (min): 15 min    Assessment/Plan   PT Assessment  Evaluation/Treatment Tolerance: Patient tolerated treatment well  Assessment Comment: No PT needs noted at this time. Defer sling training and LUE to OT  End of Session Patient Position: Up in chair, Alarm on  IP OR SWING BED PT PLAN  Inpatient or Swing Bed: Inpatient  PT Plan  PT Plan: PT Eval only  PT Eval Only Reason: No acute PT needs identified  PT Discharge Recommendations: No PT needed after discharge  PT Recommended Transfer Status: Independent  PT - OK to Discharge:  (once deemed medically appropriate)      Subjective   General Visit Information:  General  Reason for Referral: s/p Reverse Left TSA 5/29/24  Referred By: PT/OT 5/28/24 VINITA GEORGES LUE; NO ROM LEFT SHOULDER; OK ROM elbow, forearm, wrist, fingers; SLING at all times except therapy  Past Medical History Relevant to Rehab: HTN, HLD, Arthritis, DM, CAD, GERD, aortic stenosi, Gout, spondylolisthesis cervical  Patient Position Received: Alarm off, not on at start of session (sitting up in bed)  General Comment: Elective surgery  Home Living:  Home Living  Home Living Comments: Per patient report resides with spouse in 1 story home thrershold to enter. Tub shower no seat no grab bars.  Prior Level of Function:  Prior Function Per Pt/Caregiver Report  Hand Dominance: Right  Prior Function Comments: Per patient report independent in mobilty, ADLs , and shares IADLs without assistive devcie. Denies any falls past 3 months. Drives. Retired. ([employer]).  Precautions:  Precautions  UE Weight Bearing Status: Left Non-Weight Bearing  Post-Surgical Precautions:  (NO ROM LEFT SHOULDER; SLING ON AT  ALL TIMES EXCEPT THERAPY; OK ROM ELBOW, WRIST, HAND, FINIGERS)  Prosthesis/Orthosis Used:  (ultrasling in place LUE)  Vital  Signs:  Vital Signs  Heart Rate: 66  SpO2: 95 %    Objective   Pain:  Pain Assessment  Pain Score: 2  Pain Type: Surgical pain  Pain Location: Shoulder  Pain Orientation: Left  Cognition:  Cognition  Overall Cognitive Status: Within Functional Limits  Orientation Level: Oriented X4    General Assessments:  General Observation  General Observation: Patient sitting up in bed with legs marco/crossed. Ultrasling in place. Agreeable to PT     Activity Tolerance  Endurance:  (Good)    Sensation  Sensation Comment: LUE remains numb but notices that it has lessened    Static Sitting Balance  Static Sitting-: good  Dynamic Sitting Balance  Dynamic Sitting-: good    Static Standing Balance  Static Standing-: good  Dynamic Standing Balance  Dynamic Standing- good  Functional Assessments:  Bed Mobility  Bed Mobility:  (Supine > sit modified independent with HOB 80 degrees.)    Transfers  Transfer:  (Sit > stand at bed level independent, Stand > sit at recliner independent)    Ambulation/Gait Training  Ambulation/Gait Training Performed:  (Patient ambulated without assistive device 300' without LOB or gait deviation independent)  Extremity/Trunk Assessments:  RUE   RUE : Within Functional Limits (MMT shoulder/elbow/ 5/5)  LUE   LUE:  (LUE not tested this date)  RLE   RLE : Within Functional Limits (MMT hip/knee/ankle 5/5 grossly)  LLE   LLE : Within Functional Limits (MMT hip/knee/ankle 5/5 grossly)  Outcome Measures:  [facility] Basic Mobility  Turning from your back to your side while in a flat bed without using bedrails: None  Moving from lying on your back to sitting on the side of a flat bed without using bedrails: None  Moving to and from bed to chair (including a wheelchair): None  Standing up from a chair using your arms (e.g. wheelchair or bedside chair): None  To walk in hospital room: None  Climbing 3-5 steps with railing: None  Basic Mobility - Total Score: 24

## 2024-05-31 ENCOUNTER — PHARMACY VISIT (OUTPATIENT)
Dept: PHARMACY | Facility: CLINIC | Age: 79
End: 2024-05-31
Payer: MEDICARE

## 2024-05-31 LAB
ATRIAL RATE: 71 BPM
P AXIS: 43 DEGREES
P OFFSET: 185 MS
P ONSET: 127 MS
PR INTERVAL: 198 MS
Q ONSET: 226 MS
QRS COUNT: 12 BEATS
QRS DURATION: 128 MS
QT INTERVAL: 412 MS
QTC CALCULATION(BAZETT): 447 MS
QTC FREDERICIA: 436 MS
R AXIS: 64 DEGREES
T AXIS: 5 DEGREES
T OFFSET: 432 MS
VENTRICULAR RATE: 71 BPM

## 2024-06-12 ENCOUNTER — HOSPITAL ENCOUNTER (OUTPATIENT)
Dept: RADIOLOGY | Facility: HOSPITAL | Age: 79
Discharge: HOME | End: 2024-06-12
Payer: MEDICARE

## 2024-06-12 ENCOUNTER — APPOINTMENT (OUTPATIENT)
Dept: ORTHOPEDIC SURGERY | Facility: CLINIC | Age: 79
End: 2024-06-12
Payer: MEDICARE

## 2024-06-12 DIAGNOSIS — Z98.890 HISTORY OF REVERSE TOTAL REPLACEMENT OF LEFT SHOULDER JOINT: Primary | ICD-10-CM

## 2024-06-12 DIAGNOSIS — G89.18 ACUTE POSTOPERATIVE PAIN: ICD-10-CM

## 2024-06-12 DIAGNOSIS — Z96.612 STATUS POST REVERSE TOTAL ARTHROPLASTY OF LEFT SHOULDER: ICD-10-CM

## 2024-06-12 PROCEDURE — 73030 X-RAY EXAM OF SHOULDER: CPT | Mod: LEFT SIDE | Performed by: RADIOLOGY

## 2024-06-12 PROCEDURE — 99024 POSTOP FOLLOW-UP VISIT: CPT | Performed by: PHYSICIAN ASSISTANT

## 2024-06-12 PROCEDURE — 1123F ACP DISCUSS/DSCN MKR DOCD: CPT | Performed by: PHYSICIAN ASSISTANT

## 2024-06-12 PROCEDURE — 73030 X-RAY EXAM OF SHOULDER: CPT | Mod: LT

## 2024-06-12 RX ORDER — OXYCODONE AND ACETAMINOPHEN 5; 325 MG/1; MG/1
1 TABLET ORAL EVERY 6 HOURS PRN
Qty: 28 TABLET | Refills: 0 | Status: SHIPPED | OUTPATIENT
Start: 2024-06-12 | End: 2024-06-19

## 2024-06-12 NOTE — PROGRESS NOTES
History of present illness patient is 2 weeks status post left reverse total shoulder arthroplasty.  He presents in sling.  Reports just having nighttime soreness.      Physical exam:      General: No acute distress or breathing difficulty or discomfort, pleasant and cooperative with the examination.    Extremities: Left shoulder upon examination incision is clean dry and intact.  He does have diffuse ecchymosis throughout his left upper extremity but there is no redness or evidence of drainage around the incision.      Diagnostic studies: X-rays 2 views left shoulder show well aligned and position left reverse total shoulder arthroplasty    Impression: Status post left reverse total shoulder arthroplasty    Plan: We downsized and readjusted the sling.  We instructed patient on gentle supine stretches self-guided that he may perform as well as hand wrist and elbow.  He is strict nonweightbearing left upper extremity.  He will follow-up with us in 3 weeks at that time we will get x-rays 2 views left shoulder do a range of motion and wound check.

## 2024-06-18 PROCEDURE — RXMED WILLOW AMBULATORY MEDICATION CHARGE

## 2024-06-21 ENCOUNTER — PHARMACY VISIT (OUTPATIENT)
Dept: PHARMACY | Facility: CLINIC | Age: 79
End: 2024-06-21
Payer: MEDICARE

## 2024-06-23 DIAGNOSIS — E78.2 MIXED HYPERLIPIDEMIA: ICD-10-CM

## 2024-06-24 PROCEDURE — RXMED WILLOW AMBULATORY MEDICATION CHARGE

## 2024-06-24 RX ORDER — FENOFIBRATE 160 MG/1
160 TABLET ORAL DAILY
Qty: 30 TABLET | Refills: 0 | Status: SHIPPED | OUTPATIENT
Start: 2024-06-24

## 2024-06-24 NOTE — TELEPHONE ENCOUNTER
Recent Visits  Date Type Provider Dept   05/07/24 Office Visit Roger Wakefield MD Do [id number] [facility]   03/28/24 Office Visit Roger Wakefield MD Do [id number] [facility]   12/14/23 Office Visit Roger Wakefield MD Do [id number] [facility]   09/20/23 Office Visit Roger Wakefield MD Do [id number] [facility]   06/20/23 Office Visit Roger Wakefield MD Do [id number] [facility]   03/21/23 Office Visit Roger Wakefield MD Do [id number] [facility]   Showing recent visits within past 540 days and meeting all other requirements  Future Appointments  Date Type Provider Dept   07/25/24 Appointment Roger Wakefield MD Do [id number] [facility]   Showing future appointments within next 180 days and meeting all other requirements

## 2024-06-26 ENCOUNTER — PHARMACY VISIT (OUTPATIENT)
Dept: PHARMACY | Facility: CLINIC | Age: 79
End: 2024-06-26
Payer: MEDICARE

## 2024-07-08 ENCOUNTER — APPOINTMENT (OUTPATIENT)
Dept: ORTHOPEDIC SURGERY | Facility: CLINIC | Age: 79
End: 2024-07-08
Payer: MEDICARE

## 2024-07-08 ENCOUNTER — HOSPITAL ENCOUNTER (OUTPATIENT)
Dept: RADIOLOGY | Facility: HOSPITAL | Age: 79
Discharge: HOME | End: 2024-07-08
Payer: MEDICARE

## 2024-07-08 DIAGNOSIS — Z96.612 STATUS POST REVERSE TOTAL ARTHROPLASTY OF LEFT SHOULDER: ICD-10-CM

## 2024-07-08 DIAGNOSIS — Z98.890 HISTORY OF REVERSE TOTAL REPLACEMENT OF LEFT SHOULDER JOINT: ICD-10-CM

## 2024-07-08 PROCEDURE — 73030 X-RAY EXAM OF SHOULDER: CPT | Mod: LEFT SIDE | Performed by: RADIOLOGY

## 2024-07-08 PROCEDURE — 73030 X-RAY EXAM OF SHOULDER: CPT | Mod: LT

## 2024-07-08 PROCEDURE — 99024 POSTOP FOLLOW-UP VISIT: CPT | Performed by: PHYSICIAN ASSISTANT

## 2024-07-08 PROCEDURE — 1159F MED LIST DOCD IN RCRD: CPT | Performed by: PHYSICIAN ASSISTANT

## 2024-07-08 PROCEDURE — 1123F ACP DISCUSS/DSCN MKR DOCD: CPT | Performed by: PHYSICIAN ASSISTANT

## 2024-07-08 PROCEDURE — 1036F TOBACCO NON-USER: CPT | Performed by: PHYSICIAN ASSISTANT

## 2024-07-08 NOTE — PROGRESS NOTES
History of present illness patient is 5 weeks status post left reverse total shoulder arthroplasty.  He states he stopped wearing the sling after 4 weeks.  He has no complaints of pain.      Physical exam:      General: No acute distress or breathing difficulty or discomfort, pleasant and cooperative with the examination.    Extremities: Left shoulder incisions well-healed intact with hands together he can forward flex to 140 degrees      Diagnostic studies: X-rays 2 views left shoulder show well aligned well-positioned left reverse total shoulder arthroplasty    Impression: Status post left reverse total shoulder    Plan: Patient will continue to work on gentle range of motion.  We discussed he still to be nonweightbearing until 3 months postop.  He will follow-up in 6 weeks with x-rays 2 views of the left shoulder and range of motion check.

## 2024-07-16 ENCOUNTER — LAB (OUTPATIENT)
Dept: LAB | Facility: LAB | Age: 79
End: 2024-07-16
Payer: MEDICARE

## 2024-07-16 DIAGNOSIS — I10 ESSENTIAL HYPERTENSION: ICD-10-CM

## 2024-07-16 DIAGNOSIS — E78.2 MIXED HYPERLIPIDEMIA: ICD-10-CM

## 2024-07-16 DIAGNOSIS — E11.9 TYPE 2 DIABETES MELLITUS WITHOUT COMPLICATION, WITHOUT LONG-TERM CURRENT USE OF INSULIN (MULTI): ICD-10-CM

## 2024-07-16 LAB
ALBUMIN SERPL BCP-MCNC: 4.3 G/DL (ref 3.4–5)
ALP SERPL-CCNC: 55 U/L (ref 33–136)
ALT SERPL W P-5'-P-CCNC: 9 U/L (ref 10–52)
ANION GAP SERPL CALC-SCNC: 14 MMOL/L (ref 10–20)
AST SERPL W P-5'-P-CCNC: 11 U/L (ref 9–39)
BASOPHILS # BLD AUTO: 0.02 X10*3/UL (ref 0–0.1)
BASOPHILS NFR BLD AUTO: 0.4 %
BILIRUB SERPL-MCNC: 0.3 MG/DL (ref 0–1.2)
BUN SERPL-MCNC: 13 MG/DL (ref 6–23)
CALCIUM SERPL-MCNC: 9.5 MG/DL (ref 8.6–10.3)
CHLORIDE SERPL-SCNC: 106 MMOL/L (ref 98–107)
CHOLEST SERPL-MCNC: 172 MG/DL (ref 0–199)
CHOLESTEROL/HDL RATIO: 3.2
CO2 SERPL-SCNC: 24 MMOL/L (ref 21–32)
CREAT SERPL-MCNC: 0.78 MG/DL (ref 0.5–1.3)
EGFRCR SERPLBLD CKD-EPI 2021: >90 ML/MIN/1.73M*2
EOSINOPHIL # BLD AUTO: 0.14 X10*3/UL (ref 0–0.4)
EOSINOPHIL NFR BLD AUTO: 2.7 %
ERYTHROCYTE [DISTWIDTH] IN BLOOD BY AUTOMATED COUNT: 14.5 % (ref 11.5–14.5)
EST. AVERAGE GLUCOSE BLD GHB EST-MCNC: 148 MG/DL
GLUCOSE SERPL-MCNC: 154 MG/DL (ref 74–99)
HBA1C MFR BLD: 6.8 %
HCT VFR BLD AUTO: 40.2 % (ref 41–52)
HDLC SERPL-MCNC: 54.2 MG/DL
HGB BLD-MCNC: 12.3 G/DL (ref 13.5–17.5)
IMM GRANULOCYTES # BLD AUTO: 0.03 X10*3/UL (ref 0–0.5)
IMM GRANULOCYTES NFR BLD AUTO: 0.6 % (ref 0–0.9)
LDLC SERPL CALC-MCNC: 76 MG/DL
LYMPHOCYTES # BLD AUTO: 1.29 X10*3/UL (ref 0.8–3)
LYMPHOCYTES NFR BLD AUTO: 25.1 %
MCH RBC QN AUTO: 27.3 PG (ref 26–34)
MCHC RBC AUTO-ENTMCNC: 30.6 G/DL (ref 32–36)
MCV RBC AUTO: 89 FL (ref 80–100)
MONOCYTES # BLD AUTO: 0.51 X10*3/UL (ref 0.05–0.8)
MONOCYTES NFR BLD AUTO: 9.9 %
NEUTROPHILS # BLD AUTO: 3.15 X10*3/UL (ref 1.6–5.5)
NEUTROPHILS NFR BLD AUTO: 61.3 %
NON HDL CHOLESTEROL: 118 MG/DL (ref 0–149)
NRBC BLD-RTO: 0 /100 WBCS (ref 0–0)
PLATELET # BLD AUTO: 228 X10*3/UL (ref 150–450)
POTASSIUM SERPL-SCNC: 4.1 MMOL/L (ref 3.5–5.3)
PROT SERPL-MCNC: 6.6 G/DL (ref 6.4–8.2)
RBC # BLD AUTO: 4.51 X10*6/UL (ref 4.5–5.9)
SODIUM SERPL-SCNC: 140 MMOL/L (ref 136–145)
TRIGL SERPL-MCNC: 209 MG/DL (ref 0–149)
VLDL: 42 MG/DL (ref 0–40)
WBC # BLD AUTO: 5.1 X10*3/UL (ref 4.4–11.3)

## 2024-07-16 PROCEDURE — 80053 COMPREHEN METABOLIC PANEL: CPT

## 2024-07-16 PROCEDURE — 36415 COLL VENOUS BLD VENIPUNCTURE: CPT

## 2024-07-16 PROCEDURE — 80061 LIPID PANEL: CPT

## 2024-07-16 PROCEDURE — 83036 HEMOGLOBIN GLYCOSYLATED A1C: CPT

## 2024-07-16 PROCEDURE — 85025 COMPLETE CBC W/AUTO DIFF WBC: CPT

## 2024-07-25 ENCOUNTER — APPOINTMENT (OUTPATIENT)
Dept: PRIMARY CARE | Facility: CLINIC | Age: 79
End: 2024-07-25
Payer: MEDICARE

## 2024-07-25 VITALS
SYSTOLIC BLOOD PRESSURE: 116 MMHG | HEIGHT: 72 IN | BODY MASS INDEX: 28.23 KG/M2 | RESPIRATION RATE: 16 BRPM | DIASTOLIC BLOOD PRESSURE: 70 MMHG | TEMPERATURE: 97.2 F | OXYGEN SATURATION: 97 % | HEART RATE: 61 BPM | WEIGHT: 208.4 LBS

## 2024-07-25 DIAGNOSIS — Z00.00 ANNUAL PHYSICAL EXAM: Primary | ICD-10-CM

## 2024-07-25 DIAGNOSIS — M1A.00X0 CHRONIC IDIOPATHIC GOUT: ICD-10-CM

## 2024-07-25 DIAGNOSIS — E78.2 MIXED HYPERLIPIDEMIA: ICD-10-CM

## 2024-07-25 DIAGNOSIS — I10 ESSENTIAL HYPERTENSION: ICD-10-CM

## 2024-07-25 DIAGNOSIS — E11.9 TYPE 2 DIABETES MELLITUS WITHOUT COMPLICATION, WITHOUT LONG-TERM CURRENT USE OF INSULIN (MULTI): ICD-10-CM

## 2024-07-25 DIAGNOSIS — D64.9 CHRONIC ANEMIA: ICD-10-CM

## 2024-07-25 PROCEDURE — 3074F SYST BP LT 130 MM HG: CPT | Performed by: FAMILY MEDICINE

## 2024-07-25 PROCEDURE — 3078F DIAST BP <80 MM HG: CPT | Performed by: FAMILY MEDICINE

## 2024-07-25 PROCEDURE — 1158F ADVNC CARE PLAN TLK DOCD: CPT | Performed by: FAMILY MEDICINE

## 2024-07-25 PROCEDURE — 1160F RVW MEDS BY RX/DR IN RCRD: CPT | Performed by: FAMILY MEDICINE

## 2024-07-25 PROCEDURE — 1036F TOBACCO NON-USER: CPT | Performed by: FAMILY MEDICINE

## 2024-07-25 PROCEDURE — 1123F ACP DISCUSS/DSCN MKR DOCD: CPT | Performed by: FAMILY MEDICINE

## 2024-07-25 PROCEDURE — 99214 OFFICE O/P EST MOD 30 MIN: CPT | Performed by: FAMILY MEDICINE

## 2024-07-25 PROCEDURE — 99397 PER PM REEVAL EST PAT 65+ YR: CPT | Performed by: FAMILY MEDICINE

## 2024-07-25 PROCEDURE — 1159F MED LIST DOCD IN RCRD: CPT | Performed by: FAMILY MEDICINE

## 2024-07-25 RX ORDER — BLOOD-GLUCOSE METER
EACH MISCELLANEOUS
COMMUNITY
Start: 2024-07-22

## 2024-07-25 RX ORDER — ALLOPURINOL 300 MG/1
300 TABLET ORAL DAILY
Qty: 90 TABLET | Refills: 2 | Status: SHIPPED | OUTPATIENT
Start: 2024-07-25

## 2024-07-25 RX ORDER — PRAVASTATIN SODIUM 80 MG/1
80 TABLET ORAL DAILY
Qty: 90 TABLET | Refills: 1 | Status: SHIPPED | OUTPATIENT
Start: 2024-07-25

## 2024-07-25 ASSESSMENT — ENCOUNTER SYMPTOMS
CONSTIPATION: 0
BLURRED VISION: 0
VISUAL CHANGE: 0
DIZZINESS: 0
SHORTNESS OF BREATH: 0
NUMBNESS: 0
TREMORS: 0
POLYPHAGIA: 0
PALPITATIONS: 0
HEMATURIA: 0
ABDOMINAL PAIN: 0
CHILLS: 0
POLYDIPSIA: 0
FREQUENCY: 0
DYSURIA: 0
FEVER: 0
HEADACHES: 0
WHEEZING: 0
WEIGHT LOSS: 0
FATIGUE: 0
VOMITING: 0
COUGH: 0
RHINORRHEA: 0
WEAKNESS: 0
DIARRHEA: 0
SORE THROAT: 0

## 2024-07-25 NOTE — ASSESSMENT & PLAN NOTE
The nature of cardiac risk has been fully discussed with this patient. Discussed cardiovascular risk analysis and appropriate diet with the need for lifelong measures to reduce the risk. A regular exercise program is recommended to help achieve and maintain normal body weight, fitness and improve lipid balance. Patient education provided. They understand and agree with this course of treatment. They will return with new or worsening symptoms. Patient instructed to remain current with appropriate annual health maintenance.    [FreeTextEntry1] : Today's CBC reviewed with pt

## 2024-07-25 NOTE — PROGRESS NOTES
Subjective   Patient ID: Scottie Blandon is a 79 y.o. male who presents for Annual Exam and Follow-up (Diabetes, Hypertension, Hyperlipidemia, Chronic Idiopathic Gout and labs).    Patient presents today for annual physical exam and follow-up on hypertension and hyperlipidemia. He has no chest pain, dyspnea, exertional chest pressure/discomfort, near-syncope, orthopnea, palpitations, paroxysmal nocturnal dyspnea, and syncope. Taking his medication regularly with no side effects.    Diabetes  He presents for his follow-up diabetic visit. He has type 2 diabetes mellitus. His disease course has been stable. There are no hypoglycemic associated symptoms. Pertinent negatives for hypoglycemia include no dizziness, headaches or tremors. Pertinent negatives for diabetes include no blurred vision, no chest pain, no fatigue, no foot paresthesias, no foot ulcerations, no polydipsia, no polyphagia, no polyuria, no visual change, no weakness and no weight loss. Risk factors for coronary artery disease include diabetes mellitus, dyslipidemia, hypertension and male sex.        Review of Systems   Constitutional:  Negative for chills, fatigue, fever and weight loss.   HENT:  Negative for congestion, ear pain, nosebleeds, rhinorrhea and sore throat.    Eyes:  Negative for blurred vision.   Respiratory:  Negative for cough, shortness of breath and wheezing.    Cardiovascular:  Negative for chest pain, palpitations and leg swelling.   Gastrointestinal:  Negative for abdominal pain, constipation, diarrhea and vomiting.   Endocrine: Negative for polydipsia, polyphagia and polyuria.   Genitourinary:  Negative for dysuria, frequency and hematuria.   Neurological:  Negative for dizziness, tremors, weakness, numbness and headaches.       Objective   /70   Pulse 61   Temp 36.2 °C (97.2 °F)   Resp 16   Ht 1.829 m (6')   Wt 94.5 kg (208 lb 6.4 oz)   SpO2 97%   BMI 28.26 kg/m²     Physical Exam  Constitutional:       General: He is  not in acute distress.     Appearance: Normal appearance.   HENT:      Head: Normocephalic and atraumatic.      Mouth/Throat:      Mouth: Mucous membranes are moist.      Pharynx: Oropharynx is clear. No oropharyngeal exudate or posterior oropharyngeal erythema.   Eyes:      General: No scleral icterus.     Extraocular Movements: Extraocular movements intact.      Pupils: Pupils are equal, round, and reactive to light.   Cardiovascular:      Rate and Rhythm: Normal rate and regular rhythm.      Pulses: Normal pulses.      Heart sounds: No murmur heard.     No friction rub. No gallop.   Pulmonary:      Effort: Pulmonary effort is normal.      Breath sounds: No wheezing, rhonchi or rales.   Skin:     General: Skin is warm.      Coloration: Skin is not jaundiced or pale.      Findings: No erythema or rash.   Neurological:      General: No focal deficit present.      Mental Status: He is alert and oriented to person, place, and time.      Cranial Nerves: No cranial nerve deficit.      Sensory: No sensory deficit.      Coordination: Coordination normal.      Gait: Gait normal.       Lab on 07/16/2024   Component Date Value Ref Range Status    WBC 07/16/2024 5.1  4.4 - 11.3 x10*3/uL Final    nRBC 07/16/2024 0.0  0.0 - 0.0 /100 WBCs Final    RBC 07/16/2024 4.51  4.50 - 5.90 x10*6/uL Final    Hemoglobin 07/16/2024 12.3 (L)  13.5 - 17.5 g/dL Final    Hematocrit 07/16/2024 40.2 (L)  41.0 - 52.0 % Final    MCV 07/16/2024 89  80 - 100 fL Final    MCH 07/16/2024 27.3  26.0 - 34.0 pg Final    MCHC 07/16/2024 30.6 (L)  32.0 - 36.0 g/dL Final    RDW 07/16/2024 14.5  11.5 - 14.5 % Final    Platelets 07/16/2024 228  150 - 450 x10*3/uL Final    Neutrophils % 07/16/2024 61.3  40.0 - 80.0 % Final    Immature Granulocytes %, Automated 07/16/2024 0.6  0.0 - 0.9 % Final    Immature Granulocyte Count (IG) includes promyelocytes, myelocytes and metamyelocytes but does not include bands. Percent differential counts (%) should be interpreted  in the context of the absolute cell counts (cells/UL).    Lymphocytes % 07/16/2024 25.1  13.0 - 44.0 % Final    Monocytes % 07/16/2024 9.9  2.0 - 10.0 % Final    Eosinophils % 07/16/2024 2.7  0.0 - 6.0 % Final    Basophils % 07/16/2024 0.4  0.0 - 2.0 % Final    Neutrophils Absolute 07/16/2024 3.15  1.60 - 5.50 x10*3/uL Final    Percent differential counts (%) should be interpreted in the context of the absolute cell counts (cells/uL).    Immature Granulocytes Absolute, Au* 07/16/2024 0.03  0.00 - 0.50 x10*3/uL Final    Lymphocytes Absolute 07/16/2024 1.29  0.80 - 3.00 x10*3/uL Final    Monocytes Absolute 07/16/2024 0.51  0.05 - 0.80 x10*3/uL Final    Eosinophils Absolute 07/16/2024 0.14  0.00 - 0.40 x10*3/uL Final    Basophils Absolute 07/16/2024 0.02  0.00 - 0.10 x10*3/uL Final    Glucose 07/16/2024 154 (H)  74 - 99 mg/dL Final    Sodium 07/16/2024 140  136 - 145 mmol/L Final    Potassium 07/16/2024 4.1  3.5 - 5.3 mmol/L Final    Chloride 07/16/2024 106  98 - 107 mmol/L Final    Bicarbonate 07/16/2024 24  21 - 32 mmol/L Final    Anion Gap 07/16/2024 14  10 - 20 mmol/L Final    Urea Nitrogen 07/16/2024 13  6 - 23 mg/dL Final    Creatinine 07/16/2024 0.78  0.50 - 1.30 mg/dL Final    eGFR 07/16/2024 >90  >60 mL/min/1.73m*2 Final    Calculations of estimated GFR are performed using the 2021 CKD-EPI Study Refit equation without the race variable for the IDMS-Traceable creatinine methods.  https://jasn.asnjournals.org/content/early/2021/09/22/ASN.6304553922    Calcium 07/16/2024 9.5  8.6 - 10.3 mg/dL Final    Albumin 07/16/2024 4.3  3.4 - 5.0 g/dL Final    Alkaline Phosphatase 07/16/2024 55  33 - 136 U/L Final    Total Protein 07/16/2024 6.6  6.4 - 8.2 g/dL Final    AST 07/16/2024 11  9 - 39 U/L Final    Bilirubin, Total 07/16/2024 0.3  0.0 - 1.2 mg/dL Final    ALT 07/16/2024 9 (L)  10 - 52 U/L Final    Patients treated with Sulfasalazine may generate falsely decreased results for ALT.    Hemoglobin A1C 07/16/2024 6.8  (H)  see below % Final    Estimated Average Glucose 07/16/2024 148  Not Established mg/dL Final    Cholesterol 07/16/2024 172  0 - 199 mg/dL Final          Age      Desirable   Borderline High   High     0-19 Y     0 - 169       170 - 199     >/= 200    20-24 Y     0 - 189       190 - 224     >/= 225         >24 Y     0 - 199       200 - 239     >/= 240   **All ranges are based on fasting samples. Specific   therapeutic targets will vary based on patient-specific   cardiac risk.    Pediatric guidelines reference:Pediatrics 2011, 128(S5).Adult guidelines reference: NCEP ATPIII Guidelines,MICHAELLE 2001, 258:2486-09    Venipuncture immediately after or during the administration of Metamizole may lead to falsely low results. Testing should be performed immediately prior to Metamizole dosing.    HDL-Cholesterol 07/16/2024 54.2  mg/dL Final      Age       Very Low   Low     Normal    High    0-19 Y    < 35      < 40     40-45     ----  20-24 Y    ----     < 40      >45      ----        >24 Y      ----     < 40     40-60      >60      Cholesterol/HDL Ratio 07/16/2024 3.2   Final      Ref Values  Desirable  < 3.4  High Risk  > 5.0    LDL Calculated 07/16/2024 76  <=99 mg/dL Final                                Near   Borderline      AGE      Desirable  Optimal    High     High     Very High     0-19 Y     0 - 109     ---    110-129   >/= 130     ----    20-24 Y     0 - 119     ---    120-159   >/= 160     ----      >24 Y     0 -  99   100-129  130-159   160-189     >/=190      VLDL 07/16/2024 42 (H)  0 - 40 mg/dL Final    Triglycerides 07/16/2024 209 (H)  0 - 149 mg/dL Final       Age         Desirable   Borderline High   High     Very High   0 D-90 D    19 - 174         ----         ----        ----  91 D- 9 Y     0 -  74        75 -  99     >/= 100      ----    10-19 Y     0 -  89        90 - 129     >/= 130      ----    20-24 Y     0 - 114       115 - 149     >/= 150      ----         >24 Y     0 - 149       150 - 199     200- 499    >/= 500    Venipuncture immediately after or during the administration of Metamizole may lead to falsely low results. Testing should be performed immediately prior to Metamizole dosing.    Non HDL Cholesterol 07/16/2024 118  0 - 149 mg/dL Final          Age       Desirable   Borderline High   High     Very High     0-19 Y     0 - 119       120 - 144     >/= 145    >/= 160    20-24 Y     0 - 149       150 - 189     >/= 190      ----         >24 Y    30 mg/dL above LDL Cholesterol goal         Assessment/Plan   Problem List Items Addressed This Visit       Annual physical exam - Primary     Recommend low-cholesterol diet, low-fat diet and low-salt diet.  The need for lifelong dietary compliance in order to reduce cardiac risk is recommended.  We will also recommend regular exercise program to improve lipid balance and overall health.  Recommend decreasing fat and cholesterol in diet, increasing aerobic exercise with a goal of 4 or more days per week         Chronic anemia     Stable, continue current medications and management.         Relevant Orders    CBC and Auto Differential    Chronic idiopathic gout     Well-controlled, continue current medications and management.         Relevant Medications    allopurinol (Zyloprim) 300 mg tablet    Essential hypertension     Well-controlled, continue current medications and management.         Relevant Orders    Follow Up In [facility] - PCP - Established    CBC and Auto Differential    Hemoglobin A1C    Comprehensive Metabolic Panel    Lipid Panel    Mixed hyperlipidemia     The nature of cardiac risk has been fully discussed with this patient. Discussed cardiovascular risk analysis and appropriate diet with the need for lifelong measures to reduce the risk. A regular exercise program is recommended to help achieve and maintain normal body weight, fitness and improve lipid balance. Patient education provided. They understand and agree with this course  of treatment. They will return with new or worsening symptoms. Patient instructed to remain current with appropriate annual health maintenance.          Relevant Medications    pravastatin (Pravachol) 80 mg tablet    Other Relevant Orders    Follow Up In [facility] - PCP - Established    CBC and Auto Differential    Hemoglobin A1C    Comprehensive Metabolic Panel    Lipid Panel    Type 2 diabetes mellitus without complication, without long-term current use of insulin (Multi)     Diabetes Mellitus type II, under good control.  1. Rx changes: None  2. Education: Reviewed ‘ABCs’ of diabetes management (respective goals in parentheses):  A1C (<7), blood pressure (<130/80), and cholesterol (LDL <100).  3. Compliance at present is estimated to be good. Efforts to improve compliance (if necessary) will be directed at dietary modifications: and increased exercise.  4. Follow up: 4 months         Relevant Orders    Follow Up In [facility] - PCP - Established    CBC and Auto Differential    Hemoglobin A1C    Comprehensive Metabolic Panel    Lipid Panel     Scribe Attestation  By signing my name below, Jean Marie DUMONT Scribe   attest that this documentation has been prepared under the direction and in the presence of Roger Wakefield MD.

## 2024-07-26 PROCEDURE — RXMED WILLOW AMBULATORY MEDICATION CHARGE

## 2024-07-26 NOTE — ADDENDUM NOTE
Addended by: HAIDER CHAO on: 7/26/2024 03:53 PM     Modules accepted: Orders     Pt dc aaox4 patent airway & steady gait out of er with parents; mother and father verbalized understanding of dc teaching.

## 2024-07-28 DIAGNOSIS — E78.2 MIXED HYPERLIPIDEMIA: Primary | ICD-10-CM

## 2024-07-29 ENCOUNTER — PHARMACY VISIT (OUTPATIENT)
Dept: PHARMACY | Facility: CLINIC | Age: 79
End: 2024-07-29
Payer: MEDICARE

## 2024-07-29 RX ORDER — FENOFIBRATE 160 MG/1
160 TABLET ORAL DAILY
Qty: 30 TABLET | Refills: 8 | Status: SHIPPED | OUTPATIENT
Start: 2024-07-29

## 2024-08-19 ENCOUNTER — HOSPITAL ENCOUNTER (OUTPATIENT)
Dept: RADIOLOGY | Facility: HOSPITAL | Age: 79
Discharge: HOME | End: 2024-08-19
Payer: MEDICARE

## 2024-08-19 ENCOUNTER — APPOINTMENT (OUTPATIENT)
Dept: ORTHOPEDIC SURGERY | Facility: CLINIC | Age: 79
End: 2024-08-19
Payer: MEDICARE

## 2024-08-19 DIAGNOSIS — M12.812 ROTATOR CUFF ARTHROPATHY, LEFT: ICD-10-CM

## 2024-08-19 PROCEDURE — 73030 X-RAY EXAM OF SHOULDER: CPT | Mod: LT

## 2024-08-19 PROCEDURE — 99024 POSTOP FOLLOW-UP VISIT: CPT | Performed by: PHYSICIAN ASSISTANT

## 2024-08-19 PROCEDURE — 1123F ACP DISCUSS/DSCN MKR DOCD: CPT | Performed by: PHYSICIAN ASSISTANT

## 2024-08-19 PROCEDURE — 73030 X-RAY EXAM OF SHOULDER: CPT | Mod: LEFT SIDE | Performed by: RADIOLOGY

## 2024-08-19 NOTE — PROGRESS NOTES
History of present illness patient is almost 3 months out from left reverse total shoulder arthroplasty.  Overall is doing extremely well.  Occasional soreness with certain motions otherwise he is able to do all his normal activities.      Physical exam:      General: No acute distress or breathing difficulty or discomfort, pleasant and cooperative with the examination.    Extremities: Left shoulder incisions well-healed intact he has forward flexion up to 140 degrees abduction of 80 degrees external rotation of 60 degrees internal rotation posterior iliac crest      Diagnostic studies: X-rays 2 views left shoulder show well aligned with position left reverse total shoulder arthroplasty    Impression: Status post left reverse total shoulder arthroplasty    Plan: Patient will continue work on gentle range of motion.  It is okay for him to start some weightbearing on this arm.  He will return to all his normal activities.  He will follow-up with us in 1 year at that time we will get x-rays 2 views left shoulder and do a range of motion check sooner if he has any issues.

## 2024-08-22 PROCEDURE — RXMED WILLOW AMBULATORY MEDICATION CHARGE

## 2024-08-23 ENCOUNTER — PHARMACY VISIT (OUTPATIENT)
Dept: PHARMACY | Facility: CLINIC | Age: 79
End: 2024-08-23
Payer: MEDICARE

## 2024-09-11 DIAGNOSIS — E11.9 TYPE 2 DIABETES MELLITUS WITHOUT COMPLICATION, WITHOUT LONG-TERM CURRENT USE OF INSULIN (MULTI): ICD-10-CM

## 2024-09-11 DIAGNOSIS — E78.2 MIXED HYPERLIPIDEMIA: ICD-10-CM

## 2024-09-11 RX ORDER — PRAVASTATIN SODIUM 80 MG/1
80 TABLET ORAL DAILY
Qty: 90 TABLET | Refills: 1 | Status: SHIPPED | OUTPATIENT
Start: 2024-09-11

## 2024-09-11 RX ORDER — METFORMIN HYDROCHLORIDE 500 MG/1
1000 TABLET ORAL
Qty: 360 TABLET | Refills: 1 | Status: SHIPPED | OUTPATIENT
Start: 2024-09-11

## 2024-09-11 NOTE — TELEPHONE ENCOUNTER
Rx Refill Request Telephone Encounter    Name:  Scottie Blandon  :  [id number]    Specific Pharmacy location:  [facility] pharmacy   Date of last appointment:  24  Date of next appointment:  24

## 2024-09-18 DIAGNOSIS — M1A.00X0 CHRONIC IDIOPATHIC GOUT: ICD-10-CM

## 2024-09-18 RX ORDER — ALLOPURINOL 300 MG/1
300 TABLET ORAL DAILY
Qty: 90 TABLET | Refills: 2 | Status: SHIPPED | OUTPATIENT
Start: 2024-09-18

## 2024-09-18 NOTE — TELEPHONE ENCOUNTER
Rx Refill Request     Dispensed on 24 for 90 day refill.  Please advise    Name: Scottie Blandon  :  1945     Date of last appointment:  2024   Date of next appointment:  2024   Best number to reach patient:  [phone]

## 2024-09-20 PROCEDURE — RXMED WILLOW AMBULATORY MEDICATION CHARGE

## 2024-09-23 ENCOUNTER — PHARMACY VISIT (OUTPATIENT)
Dept: PHARMACY | Facility: CLINIC | Age: 79
End: 2024-09-23
Payer: MEDICARE

## 2024-09-26 ENCOUNTER — PHARMACY VISIT (OUTPATIENT)
Dept: PHARMACY | Facility: CLINIC | Age: 79
End: 2024-09-26
Payer: MEDICARE

## 2024-09-26 PROCEDURE — RXMED WILLOW AMBULATORY MEDICATION CHARGE

## 2024-10-02 DIAGNOSIS — I10 ESSENTIAL HYPERTENSION: Primary | ICD-10-CM

## 2024-10-02 DIAGNOSIS — K21.9 GASTROESOPHAGEAL REFLUX DISEASE WITHOUT ESOPHAGITIS: ICD-10-CM

## 2024-10-02 RX ORDER — OMEPRAZOLE 20 MG/1
CAPSULE, DELAYED RELEASE ORAL DAILY
Qty: 90 CAPSULE | Refills: 2 | Status: SHIPPED | OUTPATIENT
Start: 2024-10-02

## 2024-10-02 RX ORDER — LISINOPRIL 2.5 MG/1
2.5 TABLET ORAL DAILY
Qty: 90 TABLET | Refills: 2 | Status: SHIPPED | OUTPATIENT
Start: 2024-10-02

## 2024-10-22 ENCOUNTER — APPOINTMENT (OUTPATIENT)
Dept: PHARMACY | Facility: HOSPITAL | Age: 79
End: 2024-10-22
Payer: MEDICARE

## 2024-10-22 DIAGNOSIS — E11.9 TYPE 2 DIABETES MELLITUS WITHOUT COMPLICATION, WITHOUT LONG-TERM CURRENT USE OF INSULIN (MULTI): ICD-10-CM

## 2024-10-22 PROCEDURE — RXMED WILLOW AMBULATORY MEDICATION CHARGE

## 2024-10-22 RX ORDER — DAPAGLIFLOZIN 5 MG/1
5 TABLET, FILM COATED ORAL DAILY
Qty: 30 TABLET | Refills: 2 | Status: SHIPPED | OUTPATIENT
Start: 2024-10-22 | End: 2025-11-26

## 2024-10-22 NOTE — PROGRESS NOTES
Pharmacist Clinic: Medication Cost Follow-Up Visit    Subjective   Patient ID: Scottie Blandon is a 79 y.o. male who presents for Diabetes.    Referring Provider: Roger Wakefield MD      Patient Assistance for Januvia & Farxiga approved through 12/12/24. Will have to be renewed prior to that date to prevent lapse in coverage. Medication(s) will be received at no cost to patient from [facility].    Patient's wife, Marta, presents to Clinical Pharmacy team per PCP for re-enrollment into [facility] program. Reviewed requirements for [facility] program with wife.     Patient's wife notes no changes with Scottie's diabetes regimen since last visit with PCP.     Objective     There were no vitals taken for this visit.     Labs  Lab Results   Component Value Date    BILITOT 0.3 07/16/2024    CALCIUM 9.5 07/16/2024    CO2 24 07/16/2024     07/16/2024    CREATININE 0.78 07/16/2024    GLUCOSE 154 (H) 07/16/2024    ALKPHOS 55 07/16/2024    K 4.1 07/16/2024    PROT 6.6 07/16/2024     07/16/2024    AST 11 07/16/2024    ALT 9 (L) 07/16/2024    BUN 13 07/16/2024    ANIONGAP 14 07/16/2024    ALBUMIN 4.3 07/16/2024    GFRMALE >90 09/14/2023     Lab Results   Component Value Date    TRIG 209 (H) 07/16/2024    CHOL 172 07/16/2024    LDLCALC 76 07/16/2024    HDL 54.2 07/16/2024     Lab Results   Component Value Date    HGBA1C 6.8 (H) 07/16/2024       Current Outpatient Medications on File Prior to Visit   Medication Sig Dispense Refill    allopurinol (Zyloprim) 300 mg tablet Take 1 tablet by mouth once daily. 90 tablet 2    aspirin 81 mg EC tablet Take 1 tablet (81 mg) by mouth once daily.      blood sugar diagnostic (Blood Glucose Test) strip 3 times a day.      celecoxib (CeleBREX) 200 mg capsule Take 1 capsule (200 mg) by mouth once daily.      dapagliflozin propanediol (Farxiga) 5 mg Take 1 tablet (5 mg) by mouth once daily. 30 tablet 2    fenofibrate (Triglide) 160 mg tablet TAKE 1 TABLET BY MOUTH ONCE DAILY 30 tablet 8     ferrous sulfate 324 mg (65 mg iron) EC tablet Take 1 tablet (65 mg) by mouth once daily with breakfast.      glipiZIDE (Glucotrol) 10 mg tablet Take 1 tablet (10 mg) by mouth 2 times a day before meals. 180 tablet 1    ibuprofen 200 mg tablet Take 1 tablet (200 mg) by mouth every 6 hours if needed for mild pain (1 - 3).      lancets 30 gauge misc in the morning, at noon, and at bedtime.      lisinopril 2.5 mg tablet TAKE 1 TABLET BY MOUTH ONCE DAILY 90 tablet 2    metFORMIN (Glucophage) 500 mg tablet Take 2 tablets (1,000 mg) by mouth 2 times a day with meals. 360 tablet 1    naloxone (Narcan) 4 mg/0.1 mL nasal spray Instill 1 spray intranasally for opiod overdose; repeat in 5 minutes if no response. 2 each 0    NIACIN, NIACINAMIDE, ORAL Take 1 tablet by mouth 2 times a day.      omeprazole (PriLOSEC) 20 mg DR capsule TAKE 1 TABLET BY MOUTH ONCE DAILY 90 capsule 2    OneTouch Verio High Control solution       pravastatin (Pravachol) 80 mg tablet TAKE 1 TABLET BY MOUTH DAILY 90 tablet 1    SITagliptin phosphate (Januvia) 100 mg tablet Take 1 tablet (100 mg) by mouth once daily. 90 tablet 3    tamsulosin (Flomax) 0.4 mg 24 hr capsule Take 1 capsule (0.4 mg) by mouth once daily. 90 capsule 1     No current facility-administered medications on file prior to visit.        Assessment/Plan   Problem List Items Addressed This Visit             ICD-10-CM    Type 2 diabetes mellitus without complication, without long-term current use of insulin (Multi) E11.9           ASSESSMENT  Patients diabetes is well controlled with most recent A1c of 6.8% (goal < 7%). Glycemic control is estimated to still be at goal. Patient has been tolerating current therapy well.   Reviewed requirements for [facility] with patient's wife, Marta. She will bring in required forms to PCP office for renewal application.     PATIENT GOALS   Fasting B - 130 mg/dL 2-HR Postprandial BG:   Less than 180 mg/dL A1c:   Less than 7.0 %      RECOMMENDATIONS/PLAN  CONTINUE all medications as prescribed for diabetes:  Januvia 100 mg daily  Farxiga 5 mg daily  Follow up: 1 month by telephone. Encouraged patient to reach out with any concerns or questions prior to next appointment.           Cyndi Christensen PharmD  Clinical Ambulatory Care Pharmacist  Ph: [phone]    Continue all meds under the continuation of care with the referring provider and clinical pharmacy team.    Clinical Pharmacist follow up: 11/19/24 or sooner as needed based on clinical intervention  Type of Encounter:  Telephone    Verbal consent to manage patient's drug therapy was obtained from the patient. They were informed they may decline to participate or withdraw from participation in pharmacy services at any time.

## 2024-10-22 NOTE — ASSESSMENT & PLAN NOTE
ASSESSMENT  Patients diabetes is well controlled with most recent A1c of 6.8% (goal < 7%). Glycemic control is estimated to still be at goal. Patient has been tolerating current therapy well.   Reviewed requirements for  [employer] with patient's wife, Marta. She will bring in required forms to PCP office for renewal application.     PATIENT GOALS   Fasting B - 130 mg/dL 2-HR Postprandial BG:   Less than 180 mg/dL A1c:   Less than 7.0 %     RECOMMENDATIONS/PLAN  CONTINUE all medications as prescribed for diabetes:  Januvia 100 mg daily  Farxiga 5 mg daily  Follow up: 1 month by telephone. Encouraged patient to reach out with any concerns or questions prior to next appointment.

## 2024-10-23 DIAGNOSIS — N13.8 BENIGN PROSTATIC HYPERPLASIA WITH URINARY OBSTRUCTION: ICD-10-CM

## 2024-10-23 DIAGNOSIS — N40.1 BENIGN PROSTATIC HYPERPLASIA WITH URINARY OBSTRUCTION: ICD-10-CM

## 2024-10-23 RX ORDER — TAMSULOSIN HYDROCHLORIDE 0.4 MG/1
0.4 CAPSULE ORAL DAILY
Qty: 90 CAPSULE | Refills: 1 | Status: SHIPPED | OUTPATIENT
Start: 2024-10-23

## 2024-10-23 NOTE — TELEPHONE ENCOUNTER
Rx Refill Request Telephone Encounter    Name:  Scottie Blandon  :  [id number]    Specific Pharmacy location:  [facility] pharmacy   Date of last appointment:  24  Date of next appointment:  24

## 2024-10-24 ENCOUNTER — PHARMACY VISIT (OUTPATIENT)
Dept: PHARMACY | Facility: CLINIC | Age: 79
End: 2024-10-24
Payer: MEDICARE

## 2024-11-11 ENCOUNTER — OFFICE VISIT (OUTPATIENT)
Dept: ORTHOPEDIC SURGERY | Facility: CLINIC | Age: 79
End: 2024-11-11
Payer: MEDICARE

## 2024-11-11 ENCOUNTER — HOSPITAL ENCOUNTER (OUTPATIENT)
Dept: RADIOLOGY | Facility: CLINIC | Age: 79
Discharge: HOME | End: 2024-11-11
Payer: MEDICARE

## 2024-11-11 DIAGNOSIS — Z96.612 STATUS POST REVERSE TOTAL ARTHROPLASTY OF LEFT SHOULDER: ICD-10-CM

## 2024-11-11 DIAGNOSIS — M12.812 ROTATOR CUFF ARTHROPATHY, LEFT: ICD-10-CM

## 2024-11-11 PROCEDURE — 1123F ACP DISCUSS/DSCN MKR DOCD: CPT | Performed by: ORTHOPAEDIC SURGERY

## 2024-11-11 PROCEDURE — 73030 X-RAY EXAM OF SHOULDER: CPT | Mod: LT

## 2024-11-11 PROCEDURE — 99213 OFFICE O/P EST LOW 20 MIN: CPT | Performed by: ORTHOPAEDIC SURGERY

## 2024-11-11 PROCEDURE — 99214 OFFICE O/P EST MOD 30 MIN: CPT | Performed by: ORTHOPAEDIC SURGERY

## 2024-11-11 RX ORDER — HYDROCODONE BITARTRATE AND ACETAMINOPHEN 5; 325 MG/1; MG/1
1 TABLET ORAL EVERY 6 HOURS PRN
Qty: 20 TABLET | Refills: 0 | Status: SHIPPED | OUTPATIENT
Start: 2024-11-11 | End: 2024-11-18

## 2024-11-11 RX ORDER — CYCLOBENZAPRINE HCL 10 MG
10 TABLET ORAL 3 TIMES DAILY PRN
Qty: 30 TABLET | Refills: 0 | Status: SHIPPED | OUTPATIENT
Start: 2024-11-11 | End: 2024-11-21

## 2024-11-11 NOTE — PROGRESS NOTES
History of present illness: History left shoulder replacement back in May 2024    Patient was doing fine with excellent motion now with considerable pain inability to raise the arm he did not have anyone traumatic event but over the last couple weeks he became quite painful    Physical exam:    General: No acute distress or breathing difficulty or discomfort, pleasant and cooperative with the examination.    Extremities: Shoulder exam shows neuro intact motor intact C5-T1 he can move elbow hand and wrist    He can no longer abduct or flex or extend the arm passively that moves freely there is no instability incisions clean and dry ecchymosis bruising swelling edema is dissipated he has pain over his shoulder blade acromion then and up into his trapezius there is no obvious deformity    Diagnostic studies: X-rays show well-positioned reverse total shoulder replacement but on the axillary view there looks like to be an acute acromial stress fracture minimally or nondisplaced    Impression: Acromial fracture after reverse total shoulder replaced    Plan: Sling at all times    Nonweightbearing use of the arm    He can move elbow hand and wrist but should rest the shoulder immediately    Stat CT is recommended to confirm CAT scan diagnosed fracture of the acromion and implant stability I will see him back in 1 week

## 2024-11-12 ENCOUNTER — LAB (OUTPATIENT)
Dept: LAB | Facility: LAB | Age: 79
End: 2024-11-12
Payer: MEDICARE

## 2024-11-12 ENCOUNTER — HOSPITAL ENCOUNTER (OUTPATIENT)
Dept: RADIOLOGY | Facility: HOSPITAL | Age: 79
Discharge: HOME | End: 2024-11-12
Payer: MEDICARE

## 2024-11-12 DIAGNOSIS — M12.812 ROTATOR CUFF ARTHROPATHY, LEFT: ICD-10-CM

## 2024-11-12 DIAGNOSIS — E11.9 TYPE 2 DIABETES MELLITUS WITHOUT COMPLICATION, WITHOUT LONG-TERM CURRENT USE OF INSULIN (MULTI): ICD-10-CM

## 2024-11-12 DIAGNOSIS — Z96.612 STATUS POST REVERSE TOTAL ARTHROPLASTY OF LEFT SHOULDER: ICD-10-CM

## 2024-11-12 DIAGNOSIS — E78.2 MIXED HYPERLIPIDEMIA: ICD-10-CM

## 2024-11-12 DIAGNOSIS — D64.9 CHRONIC ANEMIA: ICD-10-CM

## 2024-11-12 DIAGNOSIS — I10 ESSENTIAL HYPERTENSION: ICD-10-CM

## 2024-11-12 LAB
ALBUMIN SERPL BCP-MCNC: 4.3 G/DL (ref 3.4–5)
ALP SERPL-CCNC: 55 U/L (ref 33–136)
ALT SERPL W P-5'-P-CCNC: 10 U/L (ref 10–52)
ANION GAP SERPL CALC-SCNC: 13 MMOL/L (ref 10–20)
AST SERPL W P-5'-P-CCNC: 12 U/L (ref 9–39)
BASOPHILS # BLD AUTO: 0.04 X10*3/UL (ref 0–0.1)
BASOPHILS NFR BLD AUTO: 0.5 %
BILIRUB SERPL-MCNC: 0.5 MG/DL (ref 0–1.2)
BUN SERPL-MCNC: 24 MG/DL (ref 6–23)
CALCIUM SERPL-MCNC: 9.8 MG/DL (ref 8.6–10.3)
CHLORIDE SERPL-SCNC: 103 MMOL/L (ref 98–107)
CHOLEST SERPL-MCNC: 190 MG/DL (ref 0–199)
CHOLESTEROL/HDL RATIO: 3.5
CO2 SERPL-SCNC: 28 MMOL/L (ref 21–32)
CREAT SERPL-MCNC: 0.97 MG/DL (ref 0.5–1.3)
EGFRCR SERPLBLD CKD-EPI 2021: 80 ML/MIN/1.73M*2
EOSINOPHIL # BLD AUTO: 0.11 X10*3/UL (ref 0–0.4)
EOSINOPHIL NFR BLD AUTO: 1.5 %
ERYTHROCYTE [DISTWIDTH] IN BLOOD BY AUTOMATED COUNT: 14.1 % (ref 11.5–14.5)
EST. AVERAGE GLUCOSE BLD GHB EST-MCNC: 163 MG/DL
GLUCOSE SERPL-MCNC: 166 MG/DL (ref 74–99)
HBA1C MFR BLD: 7.3 %
HCT VFR BLD AUTO: 41.7 % (ref 41–52)
HDLC SERPL-MCNC: 54 MG/DL
HGB BLD-MCNC: 13.4 G/DL (ref 13.5–17.5)
IMM GRANULOCYTES # BLD AUTO: 0.05 X10*3/UL (ref 0–0.5)
IMM GRANULOCYTES NFR BLD AUTO: 0.7 % (ref 0–0.9)
LDLC SERPL CALC-MCNC: 92 MG/DL
LYMPHOCYTES # BLD AUTO: 1.56 X10*3/UL (ref 0.8–3)
LYMPHOCYTES NFR BLD AUTO: 21.2 %
MCH RBC QN AUTO: 29.6 PG (ref 26–34)
MCHC RBC AUTO-ENTMCNC: 32.1 G/DL (ref 32–36)
MCV RBC AUTO: 92 FL (ref 80–100)
MONOCYTES # BLD AUTO: 0.52 X10*3/UL (ref 0.05–0.8)
MONOCYTES NFR BLD AUTO: 7.1 %
NEUTROPHILS # BLD AUTO: 5.09 X10*3/UL (ref 1.6–5.5)
NEUTROPHILS NFR BLD AUTO: 69 %
NON HDL CHOLESTEROL: 136 MG/DL (ref 0–149)
NRBC BLD-RTO: 0 /100 WBCS (ref 0–0)
PLATELET # BLD AUTO: 237 X10*3/UL (ref 150–450)
POTASSIUM SERPL-SCNC: 4.5 MMOL/L (ref 3.5–5.3)
PROT SERPL-MCNC: 6.9 G/DL (ref 6.4–8.2)
RBC # BLD AUTO: 4.53 X10*6/UL (ref 4.5–5.9)
SODIUM SERPL-SCNC: 139 MMOL/L (ref 136–145)
TRIGL SERPL-MCNC: 218 MG/DL (ref 0–149)
VLDL: 44 MG/DL (ref 0–40)
WBC # BLD AUTO: 7.4 X10*3/UL (ref 4.4–11.3)

## 2024-11-12 PROCEDURE — 73200 CT UPPER EXTREMITY W/O DYE: CPT | Mod: LT

## 2024-11-12 PROCEDURE — 80061 LIPID PANEL: CPT

## 2024-11-12 PROCEDURE — 83036 HEMOGLOBIN GLYCOSYLATED A1C: CPT

## 2024-11-12 PROCEDURE — 85025 COMPLETE CBC W/AUTO DIFF WBC: CPT

## 2024-11-12 PROCEDURE — 73200 CT UPPER EXTREMITY W/O DYE: CPT | Mod: LEFT SIDE | Performed by: STUDENT IN AN ORGANIZED HEALTH CARE EDUCATION/TRAINING PROGRAM

## 2024-11-12 PROCEDURE — 80053 COMPREHEN METABOLIC PANEL: CPT

## 2024-11-12 PROCEDURE — 36415 COLL VENOUS BLD VENIPUNCTURE: CPT

## 2024-11-14 ENCOUNTER — HOSPITAL ENCOUNTER (OUTPATIENT)
Dept: RADIOLOGY | Facility: HOSPITAL | Age: 79
Discharge: HOME | End: 2024-11-14
Payer: MEDICARE

## 2024-11-14 ENCOUNTER — OFFICE VISIT (OUTPATIENT)
Dept: PRIMARY CARE | Facility: CLINIC | Age: 79
End: 2024-11-14
Payer: MEDICARE

## 2024-11-14 VITALS
DIASTOLIC BLOOD PRESSURE: 69 MMHG | TEMPERATURE: 97.5 F | HEART RATE: 91 BPM | RESPIRATION RATE: 16 BRPM | OXYGEN SATURATION: 95 % | SYSTOLIC BLOOD PRESSURE: 146 MMHG | HEIGHT: 72 IN | WEIGHT: 212 LBS | BODY MASS INDEX: 28.71 KG/M2

## 2024-11-14 DIAGNOSIS — N50.82 ACUTE PAIN IN SCROTUM: ICD-10-CM

## 2024-11-14 DIAGNOSIS — N50.9 TENDERNESS OF SCROTUM: ICD-10-CM

## 2024-11-14 DIAGNOSIS — N45.1 EPIDIDYMITIS, RIGHT: ICD-10-CM

## 2024-11-14 DIAGNOSIS — N45.1 EPIDIDYMITIS, RIGHT: Primary | ICD-10-CM

## 2024-11-14 LAB
POC APPEARANCE, URINE: CLEAR
POC BILIRUBIN, URINE: NEGATIVE
POC BLOOD, URINE: NEGATIVE
POC COLOR, URINE: YELLOW
POC GLUCOSE, URINE: ABNORMAL MG/DL
POC KETONES, URINE: NEGATIVE MG/DL
POC LEUKOCYTES, URINE: NEGATIVE
POC NITRITE,URINE: NEGATIVE
POC PH, URINE: 5.5 PH
POC PROTEIN, URINE: NEGATIVE MG/DL
POC SPECIFIC GRAVITY, URINE: 1.01
POC UROBILINOGEN, URINE: 1 EU/DL

## 2024-11-14 PROCEDURE — 1123F ACP DISCUSS/DSCN MKR DOCD: CPT | Performed by: NURSE PRACTITIONER

## 2024-11-14 PROCEDURE — 76870 US EXAM SCROTUM: CPT

## 2024-11-14 PROCEDURE — 3077F SYST BP >= 140 MM HG: CPT | Performed by: NURSE PRACTITIONER

## 2024-11-14 PROCEDURE — 99214 OFFICE O/P EST MOD 30 MIN: CPT | Performed by: NURSE PRACTITIONER

## 2024-11-14 PROCEDURE — 87086 URINE CULTURE/COLONY COUNT: CPT

## 2024-11-14 PROCEDURE — 1159F MED LIST DOCD IN RCRD: CPT | Performed by: NURSE PRACTITIONER

## 2024-11-14 PROCEDURE — 1160F RVW MEDS BY RX/DR IN RCRD: CPT | Performed by: NURSE PRACTITIONER

## 2024-11-14 PROCEDURE — 3078F DIAST BP <80 MM HG: CPT | Performed by: NURSE PRACTITIONER

## 2024-11-14 PROCEDURE — 81003 URINALYSIS AUTO W/O SCOPE: CPT | Performed by: NURSE PRACTITIONER

## 2024-11-14 PROCEDURE — 1158F ADVNC CARE PLAN TLK DOCD: CPT | Performed by: NURSE PRACTITIONER

## 2024-11-14 RX ORDER — LANCETS 33 GAUGE
1 EACH MISCELLANEOUS AS NEEDED
COMMUNITY
Start: 2024-10-21

## 2024-11-14 RX ORDER — LANCETS 33 GAUGE
EACH MISCELLANEOUS
COMMUNITY
Start: 2024-10-21

## 2024-11-14 RX ORDER — CEFDINIR 300 MG/1
300 CAPSULE ORAL 2 TIMES DAILY
Qty: 20 CAPSULE | Refills: 0 | Status: SHIPPED | OUTPATIENT
Start: 2024-11-14 | End: 2024-11-24

## 2024-11-14 ASSESSMENT — ENCOUNTER SYMPTOMS
DEPRESSION: 0
LOSS OF SENSATION IN FEET: 0
OCCASIONAL FEELINGS OF UNSTEADINESS: 0

## 2024-11-14 ASSESSMENT — PATIENT HEALTH QUESTIONNAIRE - PHQ9
1. LITTLE INTEREST OR PLEASURE IN DOING THINGS: NOT AT ALL
SUM OF ALL RESPONSES TO PHQ9 QUESTIONS 1 AND 2: 0
2. FEELING DOWN, DEPRESSED OR HOPELESS: NOT AT ALL
2. FEELING DOWN, DEPRESSED OR HOPELESS: NOT AT ALL
1. LITTLE INTEREST OR PLEASURE IN DOING THINGS: NOT AT ALL
SUM OF ALL RESPONSES TO PHQ9 QUESTIONS 1 AND 2: 0

## 2024-11-14 NOTE — PROGRESS NOTES
Subjective   Patient ID: Scottie Blandon is a 79 y.o. male who presents for No chief complaint on file..      Symptoms: right side testicle pain, that comes and goes, pain scale 3/10, right flank pain, nausea.  Length of symptoms: 1 week ago   OTC: tylenol with mild help.  Related information:    HPI     Review of Systems    Objective   /69   Pulse 91   Temp 36.4 °C (97.5 °F)   Resp 16   Ht 1.829 m (6')   Wt 96.2 kg (212 lb)   SpO2 95%   BMI 28.75 kg/m²     Physical Exam    Assessment/Plan

## 2024-11-14 NOTE — PROGRESS NOTES
Subjective   Patient ID: Scottie Blandon is a 79 y.o. male who is with complaint of pain and tenderness on the right scrotal area.    HPI  Patient is a 79 y.o. male who CONSULTED AT [facility] today. Patient is with complaint of pain and tenderness on the right scrotal area. He states the symptoms started 3 weeks ago. The pain is achy, 5-6/10, intermittent, non radiating and most severe in the morning. He has been taking OTC pain relievers. He has no urethral discharge, rash, fever, nor chills. He denies having any STD exposure.    Review of Systems  General: no weight loss, generally healthy, no fatigue  Head:  no headaches / sinus pain, no vertigo, no injury  Eyes: no diplopia, no tearing, no pain,   Ears: no change in hearing, no tinnitus, no bleeding, no vertigo  Mouth:  no dental difficulties, no gingival bleeding, no sore throat, no loss of sense of taste  Nose: no congestion, no  discharge, no bleeding, no obstruction, no loss of sense of smell  Neck: no stiffness, no pain, no tenderness, no masses, no bruit  Pulmonary: no dyspnea, no wheezing, no hemoptysis, no cough  Cardiovascular: no chest pain, no palpitations, no syncope, no orthopnea  Gastrointestinal: no change in appetite, no dysphagia, no abdominal pains, no diarrhea, no emesis, no melena  Genito Urinary: (+) pain and tenderness on the right scrotal area, no dysuria, no urinary urgency, no nocturia, no incontinence, no change in nature of urine  Musculoskeletal: no muscle ache, no joint pain, no limitation of range of motion, no paresthesia, no numbness  Constitutional: no fever, no chills, no night sweats    Objective   Physical Exam  General: ambulatory, in no acute distress  Head: normocephalic, no lesions  Eyes: pink palpebral conjunctiva, anicteric sclerae, PERRLA, EOM's full  Abdomen: flat, NABS, soft, no direct tenderness, no rebound tenderness, no mass palpated, SIGNS: no [place], no Rovsings, no Psoas, no Obturator; No  CVA tenderness,  : normal looking male external genitalia, both testes palpable, descended, non-tender, with no mass (+) tender firm mass on the superior aspect of the right testes,     Assessment/Plan   Problem List Items Addressed This Visit    None  Visit Diagnoses         Codes    Epididymitis, right    -  Primary N45.1    Relevant Medications    cefdinir (Omnicef) 300 mg capsule    Other Relevant Orders    US scrotum    Acute pain in scrotum     N50.82    Relevant Medications    cefdinir (Omnicef) 300 mg capsule    Other Relevant Orders    US scrotum    Urine Culture    POCT UA Automated manually resulted (Completed)    Tenderness of scrotum     N50.9    Relevant Medications    cefdinir (Omnicef) 300 mg capsule    Other Relevant Orders    US scrotum        Urinalysis was done at the office today. Urinalysis result explained and discussed with patient. Urine sample submitted to laboratory for culture and sensitivity study. The laboratory examination requested were explained and discussed with patient.    patient for ultrasound of scrotum,   Patient assisted by  staff with regards to when and where to get the ultrasound done.  will call patient with official reading from the radiologist  patient verbalized understanding    DISCHARGE SUMMARY:   Patient seen and examined. Probable diagnosis, differential diagnosis, treatment, treatment options, and probable complications were discussed and explained to patient. he was to take medication/s associated with this visit. he may take over-the-counter pain and/or fever medication if needed. Advised increased oral fluid intake (2 liters of water or more per day). Reinforced to continue personal hygiene. Patient to return to clinic if there is worsening or persistence of symptoms. Patient verbalized understanding.    Patient to come back in 7 - 10 days if needed for worsening symptoms.           ALLY Marmolejo-CNP 11/14/24 11:49 AM

## 2024-11-15 DIAGNOSIS — E11.9 TYPE 2 DIABETES MELLITUS WITHOUT COMPLICATION, WITHOUT LONG-TERM CURRENT USE OF INSULIN (MULTI): ICD-10-CM

## 2024-11-15 LAB — BACTERIA UR CULT: NO GROWTH

## 2024-11-15 RX ORDER — GLIPIZIDE 10 MG/1
TABLET ORAL
Qty: 180 TABLET | Refills: 2 | Status: SHIPPED | OUTPATIENT
Start: 2024-11-15

## 2024-11-15 NOTE — TELEPHONE ENCOUNTER
Rx Refill Request     Name: Scottie Blandon  :  1945     Date of last appointment:  2024   Date of next appointment:  2024   Best number to reach patient:  [phone]

## 2024-11-16 PROCEDURE — RXMED WILLOW AMBULATORY MEDICATION CHARGE

## 2024-11-18 ENCOUNTER — DOCUMENTATION (OUTPATIENT)
Dept: PRIMARY CARE | Facility: CLINIC | Age: 79
End: 2024-11-18

## 2024-11-18 ENCOUNTER — APPOINTMENT (OUTPATIENT)
Dept: ORTHOPEDIC SURGERY | Facility: CLINIC | Age: 79
End: 2024-11-18
Payer: MEDICARE

## 2024-11-18 NOTE — PROGRESS NOTES
I called and talked to patient. We discussed ultrasound results. Patient states he has a visit with Dr Wakefield this week. He will discuss options with him.

## 2024-11-19 ENCOUNTER — APPOINTMENT (OUTPATIENT)
Dept: PHARMACY | Facility: HOSPITAL | Age: 79
End: 2024-11-19
Payer: MEDICARE

## 2024-11-19 DIAGNOSIS — E11.9 TYPE 2 DIABETES MELLITUS WITHOUT COMPLICATION, WITHOUT LONG-TERM CURRENT USE OF INSULIN (MULTI): ICD-10-CM

## 2024-11-19 NOTE — PROGRESS NOTES
Pharmacist Clinic: Medication Cost Follow-Up Visit     Subjective   Patient ID: Scottie Blandon is a 79 y.o. male who presents for Diabetes.    Referring Provider: Roger Wakefield MD      Patient Assistance for Januvia & Farxiga approved through 12/12/24. Will have to be renewed prior to that date to prevent lapse in coverage. Medication(s) will be received at no cost to patient from [facility].     Patient's wife, Marta, presents to Clinical Pharmacy team per PCP for re-enrollment into [facility] program. Reviewed requirements for [facility] program with wife. Patient's wife has brought in required paperwork and [facility] will submit application for renewal.     Patient's wife notes no changes with Scottie's diabetes regimen since last visit with PCP.     Objective     There were no vitals taken for this visit.     Labs  Lab Results   Component Value Date    BILITOT 0.5 11/12/2024    CALCIUM 9.8 11/12/2024    CO2 28 11/12/2024     11/12/2024    CREATININE 0.97 11/12/2024    GLUCOSE 166 (H) 11/12/2024    ALKPHOS 55 11/12/2024    K 4.5 11/12/2024    PROT 6.9 11/12/2024     11/12/2024    AST 12 11/12/2024    ALT 10 11/12/2024    BUN 24 (H) 11/12/2024    ANIONGAP 13 11/12/2024    ALBUMIN 4.3 11/12/2024    GFRMALE >90 09/14/2023     Lab Results   Component Value Date    TRIG 218 (H) 11/12/2024    CHOL 190 11/12/2024    LDLCALC 92 11/12/2024    HDL 54.0 11/12/2024     Lab Results   Component Value Date    HGBA1C 7.3 (H) 11/12/2024       Current Outpatient Medications on File Prior to Visit   Medication Sig Dispense Refill    allopurinol (Zyloprim) 300 mg tablet Take 1 tablet by mouth once daily. 90 tablet 2    aspirin 81 mg EC tablet Take 1 tablet (81 mg) by mouth once daily.      blood sugar diagnostic (Blood Glucose Test) strip 3 times a day.      cefdinir (Omnicef) 300 mg capsule Take 1 capsule (300 mg) by mouth 2 times a day for 10 days. 20 capsule 0    celecoxib (CeleBREX) 200 mg capsule Take 1 capsule (200 mg)  by mouth once daily.      cyclobenzaprine (Flexeril) 10 mg tablet Take 1 tablet (10 mg) by mouth 3 times a day as needed for muscle spasms for up to 10 days. 30 tablet 0    dapagliflozin propanediol (Farxiga) 5 mg Take 1 tablet (5 mg) by mouth once daily. 30 tablet 2    fenofibrate (Triglide) 160 mg tablet TAKE 1 TABLET BY MOUTH ONCE DAILY 30 tablet 8    ferrous sulfate 324 mg (65 mg iron) EC tablet Take 1 tablet (65 mg) by mouth once daily with breakfast.      glipiZIDE (Glucotrol) 10 mg tablet TAKE 1 TABLET BY MOUTH TWICE DAILY BEFORE a meal 180 tablet 2    [] HYDROcodone-acetaminophen (Norco) 5-325 mg tablet Take 1 tablet by mouth every 6 hours if needed for severe pain (7 - 10) for up to 7 days. 20 tablet 0    ibuprofen 200 mg tablet Take 1 tablet (200 mg) by mouth every 6 hours if needed for mild pain (1 - 3).      lancets 30 gauge misc in the morning, at noon, and at bedtime.      lisinopril 2.5 mg tablet TAKE 1 TABLET BY MOUTH ONCE DAILY 90 tablet 2    metFORMIN (Glucophage) 500 mg tablet Take 2 tablets (1,000 mg) by mouth 2 times a day with meals. 360 tablet 1    naloxone (Narcan) 4 mg/0.1 mL nasal spray Instill 1 spray intranasally for opiod overdose; repeat in 5 minutes if no response. 2 each 0    NIACIN, NIACINAMIDE, ORAL Take 1 tablet by mouth 2 times a day.      omeprazole (PriLOSEC) 20 mg DR capsule TAKE 1 TABLET BY MOUTH ONCE DAILY 90 capsule 2    OneTouch Delica Plus Lanc Dev lancing device 1 each if needed.      OneTouch Delica Plus Lancet 33 gauge misc       OneTouch Verio High Control solution       pravastatin (Pravachol) 80 mg tablet TAKE 1 TABLET BY MOUTH DAILY 90 tablet 1    SITagliptin phosphate (Januvia) 100 mg tablet Take 1 tablet (100 mg) by mouth once daily. 90 tablet 3    tamsulosin (Flomax) 0.4 mg 24 hr capsule TAKE 1 CAPSULE BY MOUTH ONCE DAILY 90 capsule 1    [DISCONTINUED] glipiZIDE (Glucotrol) 10 mg tablet Take 1 tablet (10 mg) by mouth 2 times a day before meals. 180 tablet  1     No current facility-administered medications on file prior to visit.        Assessment/Plan   Problem List Items Addressed This Visit             ICD-10-CM    Type 2 diabetes mellitus without complication, without long-term current use of insulin (Multi) E11.9         ASSESSMENT  Patients diabetes is increasing with most recent A1c of 7.3% (goal < 7%). Glycemic control is estimated to still be at goal. Patient has been tolerating current therapy well, though may need some adjustment based on most recent A1c result.   Reviewed requirements for [facility] re-enrollment with patient's wife, Marta. She has brought in required forms for application and [facility] will submit for renewal.     PATIENT GOALS   Fasting B - 130 mg/dL 2-HR Postprandial BG:   Less than 180 mg/dL A1c:   Less than 7.0 %      RECOMMENDATIONS/PLAN  CONTINUE all medications as prescribed for diabetes:  Januvia 100 mg daily  Farxiga 5 mg daily  Consider adjustment in medication based on latest A1c. Patient to discuss at next PCP visit this week.  Follow up: 2 weeks by telephone. Encouraged patient to reach out with any concerns or questions prior to next appointment.           Cyndi Christensen, ParthaD  Clinical Ambulatory Care Pharmacist  Ph: [phone]    Continue all meds under the continuation of care with the referring provider and clinical pharmacy team.    Clinical Pharmacist follow up: 12/3/24 or sooner as needed based on clinical intervention  Type of Encounter:  Telephone    Verbal consent to manage patient's drug therapy was obtained from the patient. They were informed they may decline to participate or withdraw from participation in pharmacy services at any time.

## 2024-11-19 NOTE — ASSESSMENT & PLAN NOTE
ASSESSMENT  Patients diabetes is increasing with most recent A1c of 7.3% (goal < 7%). Glycemic control is estimated to still be at goal. Patient has been tolerating current therapy well, though may need some adjustment based on most recent A1c result.   Reviewed requirements for [facility] re-enrollment with patient's wife, Marta. She has brought in required forms for application and [facility] will submit for renewal.     PATIENT GOALS   Fasting B - 130 mg/dL 2-HR Postprandial BG:   Less than 180 mg/dL A1c:   Less than 7.0 %      RECOMMENDATIONS/PLAN  CONTINUE all medications as prescribed for diabetes:  Januvia 100 mg daily  Farxiga 5 mg daily  Consider adjustment in medication based on latest A1c. Patient to discuss at next PCP visit this week.  Follow up: 2 weeks by telephone. Encouraged patient to reach out with any concerns or questions prior to next appointment.

## 2024-11-21 ENCOUNTER — PHARMACY VISIT (OUTPATIENT)
Dept: PHARMACY | Facility: CLINIC | Age: 79
End: 2024-11-21
Payer: MEDICARE

## 2024-11-21 ENCOUNTER — HOSPITAL ENCOUNTER (OUTPATIENT)
Dept: RADIOLOGY | Facility: HOSPITAL | Age: 79
Discharge: HOME | End: 2024-11-21
Payer: MEDICARE

## 2024-11-21 ENCOUNTER — APPOINTMENT (OUTPATIENT)
Dept: PRIMARY CARE | Facility: CLINIC | Age: 79
End: 2024-11-21
Payer: MEDICARE

## 2024-11-21 VITALS
OXYGEN SATURATION: 96 % | TEMPERATURE: 97.3 F | RESPIRATION RATE: 16 BRPM | DIASTOLIC BLOOD PRESSURE: 62 MMHG | SYSTOLIC BLOOD PRESSURE: 122 MMHG | WEIGHT: 213 LBS | HEART RATE: 82 BPM | HEIGHT: 72 IN | BODY MASS INDEX: 28.85 KG/M2

## 2024-11-21 DIAGNOSIS — E11.3293 TYPE 2 DIABETES MELLITUS WITH BOTH EYES AFFECTED BY MILD NONPROLIFERATIVE RETINOPATHY WITHOUT MACULAR EDEMA, WITHOUT LONG-TERM CURRENT USE OF INSULIN: Primary | ICD-10-CM

## 2024-11-21 DIAGNOSIS — E78.2 MIXED HYPERLIPIDEMIA: ICD-10-CM

## 2024-11-21 DIAGNOSIS — M87.051 AVASCULAR NECROSIS OF FEMORAL HEAD, RIGHT (MULTI): ICD-10-CM

## 2024-11-21 DIAGNOSIS — M25.551 ACUTE RIGHT HIP PAIN: ICD-10-CM

## 2024-11-21 DIAGNOSIS — M76.891 TENDINITIS OF ADDUCTOR MUSCLE OF RIGHT HIP: ICD-10-CM

## 2024-11-21 DIAGNOSIS — K21.9 GASTROESOPHAGEAL REFLUX DISEASE WITHOUT ESOPHAGITIS: ICD-10-CM

## 2024-11-21 DIAGNOSIS — M1A.00X0 CHRONIC IDIOPATHIC GOUT: ICD-10-CM

## 2024-11-21 DIAGNOSIS — I10 ESSENTIAL HYPERTENSION: ICD-10-CM

## 2024-11-21 DIAGNOSIS — D64.9 CHRONIC ANEMIA: ICD-10-CM

## 2024-11-21 PROCEDURE — 1159F MED LIST DOCD IN RCRD: CPT | Performed by: FAMILY MEDICINE

## 2024-11-21 PROCEDURE — 1158F ADVNC CARE PLAN TLK DOCD: CPT | Performed by: FAMILY MEDICINE

## 2024-11-21 PROCEDURE — 1160F RVW MEDS BY RX/DR IN RCRD: CPT | Performed by: FAMILY MEDICINE

## 2024-11-21 PROCEDURE — 3078F DIAST BP <80 MM HG: CPT | Performed by: FAMILY MEDICINE

## 2024-11-21 PROCEDURE — 99215 OFFICE O/P EST HI 40 MIN: CPT | Performed by: FAMILY MEDICINE

## 2024-11-21 PROCEDURE — 73502 X-RAY EXAM HIP UNI 2-3 VIEWS: CPT | Mod: RT

## 2024-11-21 PROCEDURE — 1036F TOBACCO NON-USER: CPT | Performed by: FAMILY MEDICINE

## 2024-11-21 PROCEDURE — 1123F ACP DISCUSS/DSCN MKR DOCD: CPT | Performed by: FAMILY MEDICINE

## 2024-11-21 PROCEDURE — 73502 X-RAY EXAM HIP UNI 2-3 VIEWS: CPT | Mod: RIGHT SIDE | Performed by: RADIOLOGY

## 2024-11-21 PROCEDURE — G2211 COMPLEX E/M VISIT ADD ON: HCPCS | Performed by: FAMILY MEDICINE

## 2024-11-21 PROCEDURE — 3074F SYST BP LT 130 MM HG: CPT | Performed by: FAMILY MEDICINE

## 2024-11-21 RX ORDER — HYDROCODONE BITARTRATE AND ACETAMINOPHEN 5; 325 MG/1; MG/1
1 TABLET ORAL EVERY 6 HOURS PRN
Qty: 20 TABLET | Refills: 0 | Status: SHIPPED | OUTPATIENT
Start: 2024-11-21 | End: 2024-11-26

## 2024-11-21 ASSESSMENT — PATIENT HEALTH QUESTIONNAIRE - PHQ9
1. LITTLE INTEREST OR PLEASURE IN DOING THINGS: NOT AT ALL
SUM OF ALL RESPONSES TO PHQ9 QUESTIONS 1 AND 2: 1
2. FEELING DOWN, DEPRESSED OR HOPELESS: SEVERAL DAYS
10. IF YOU CHECKED OFF ANY PROBLEMS, HOW DIFFICULT HAVE THESE PROBLEMS MADE IT FOR YOU TO DO YOUR WORK, TAKE CARE OF THINGS AT HOME, OR GET ALONG WITH OTHER PEOPLE: NOT DIFFICULT AT ALL

## 2024-11-21 ASSESSMENT — ENCOUNTER SYMPTOMS
SHORTNESS OF BREATH: 0
HEADACHES: 0
TINGLING: 0
DIZZINESS: 0
DYSURIA: 0
POLYDIPSIA: 0
COUGH: 0
WEAKNESS: 0
FATIGUE: 0
HEMATURIA: 0
VISUAL CHANGE: 0
INABILITY TO BEAR WEIGHT: 1
PALPITATIONS: 0
DIARRHEA: 0
TREMORS: 0
POLYPHAGIA: 0
NUMBNESS: 0
SORE THROAT: 0
LOSS OF SENSATION: 0
HIP PAIN: 1
ABDOMINAL PAIN: 0
FEVER: 0
FREQUENCY: 0
RHINORRHEA: 0
BLURRED VISION: 0
VOMITING: 0
WHEEZING: 0
CONSTIPATION: 0
CHILLS: 0

## 2024-11-21 NOTE — ASSESSMENT & PLAN NOTE
Diabetes Mellitus type II, under fair control.  1. Rx changes: None  2. Education: Reviewed ‘ABCs’ of diabetes management (respective goals in parentheses):  A1C (<7), blood pressure (<130/80), and cholesterol (LDL <100).  3. Compliance at present is estimated to be fair. Efforts to improve compliance (if necessary) will be directed at dietary modifications: and increased exercise.  4. Follow up: 4 months

## 2024-11-21 NOTE — ASSESSMENT & PLAN NOTE
We will order X-Ray of the Right hip for further evaluation. If the X-Ray is negative we will plan to get CT of the hip. Follow-up if persistent or worsening symptoms otherwise when necessary.

## 2024-11-21 NOTE — PROGRESS NOTES
Subjective   Patient ID: Scottie Blandon is a 79 y.o. male who presents for Follow-up (Diabetes, Hypertension, Hyperlipidemia and labs) and Hip Pain.    Patient is here for follow-up on hypertension and hyperlipidemia. He has no chest pain, dyspnea, exertional chest pressure/discomfort, near-syncope, orthopnea, palpitations, paroxysmal nocturnal dyspnea, and syncope. Taking his medication regularly with no side effects.    He complains of testicle pain. He was recently diagnosed with a cyst on testicle and has been taking pain medications to help with the extreme pain which is prescribed by Dr Menendez for his shoulder. Has had ultrasound completed was order by NP at the [facility]. He is currently not seeing anyone for this was told to follow up with PCP for further evaluation.  First noticed the cyst about a month ago, it causes a lot of pain, and it is increasing, everyday and it is very intense.     Diabetes  He presents for his follow-up diabetic visit. He has type 2 diabetes mellitus. His disease course has been worsening. There are no hypoglycemic associated symptoms. Pertinent negatives for hypoglycemia include no dizziness, headaches or tremors. Pertinent negatives for diabetes include no blurred vision, no chest pain, no fatigue, no foot paresthesias, no foot ulcerations, no polydipsia, no polyphagia, no polyuria, no visual change and no weakness. Risk factors for coronary artery disease include diabetes mellitus, dyslipidemia, hypertension and male sex.   Hip Pain   Pain location: Right Hip that radiates into the groin and testicle. Onset was about a month ago. The pain is severe. The pain has been Constant since onset. Associated symptoms include an inability to bear weight. Pertinent negatives include no loss of sensation, numbness or tingling.        Review of Systems   Constitutional:  Negative for chills, fatigue and fever.   HENT:  Negative for congestion, ear pain, nosebleeds, rhinorrhea  and sore throat.    Eyes:  Negative for blurred vision.   Respiratory:  Negative for cough, shortness of breath and wheezing.    Cardiovascular:  Negative for chest pain, palpitations and leg swelling.   Gastrointestinal:  Negative for abdominal pain, constipation, diarrhea and vomiting.   Endocrine: Negative for polydipsia, polyphagia and polyuria.   Genitourinary:  Negative for dysuria, frequency and hematuria.   Neurological:  Negative for dizziness, tingling, tremors, weakness, numbness and headaches.       Objective   /62   Pulse 82   Temp 36.3 °C (97.3 °F)   Resp 16   Ht 1.829 m (6')   Wt 96.6 kg (213 lb)   SpO2 96%   BMI 28.89 kg/m²     Physical Exam  Constitutional:       General: He is not in acute distress.     Appearance: Normal appearance.   HENT:      Head: Normocephalic and atraumatic.      Mouth/Throat:      Mouth: Mucous membranes are moist.      Pharynx: Oropharynx is clear. No oropharyngeal exudate or posterior oropharyngeal erythema.   Eyes:      General: No scleral icterus.     Extraocular Movements: Extraocular movements intact.      Pupils: Pupils are equal, round, and reactive to light.   Cardiovascular:      Rate and Rhythm: Normal rate and regular rhythm.      Pulses: Normal pulses.      Heart sounds: No murmur heard.     No friction rub. No gallop.   Pulmonary:      Effort: Pulmonary effort is normal.      Breath sounds: No wheezing, rhonchi or rales.   Genitourinary:     Comments: External genitalia revealed bilaterally descended testes with right hydrocele which is nontender.  No testicular mass.  Normal scrotum with no evidence of epididymitis or orchitis.  No testicular or scrotal or epididymal tenderness.  No tenderness along the spermatic cord.  No perineal tenderness.  Musculoskeletal:      Comments: Examination of the right hip reveals mild tenderness over the adductor tubercle on the right side with tenderness along the attachment of the adductor tendon to the pubic  tubercle.  Internal rotation of the hip was very painful and restricted.  There is tenderness with heel impaction.  No tenderness over the trochanteric bursa.  No palpable hernia.   Skin:     General: Skin is warm.      Coloration: Skin is not jaundiced or pale.      Findings: No erythema or rash.   Neurological:      General: No focal deficit present.      Mental Status: He is alert and oriented to person, place, and time.      Cranial Nerves: No cranial nerve deficit.      Sensory: No sensory deficit.      Coordination: Coordination normal.      Gait: Gait normal.         Office Visit on 11/14/2024   Component Date Value Ref Range Status    POC Color, Urine 11/14/2024 Yellow  Straw, Yellow, Light-Yellow Final    POC Appearance, Urine 11/14/2024 Clear  Clear Final    POC Glucose, Urine 11/14/2024 >=1000 (4+) (A)  NEGATIVE mg/dl Final    POC Bilirubin, Urine 11/14/2024 NEGATIVE  NEGATIVE Final    POC Ketones, Urine 11/14/2024 NEGATIVE  NEGATIVE mg/dl Final    POC Specific Gravity, Urine 11/14/2024 1.015  1.005 - 1.035 Final    POC Blood, Urine 11/14/2024 NEGATIVE  NEGATIVE Final    POC PH, Urine 11/14/2024 5.5  No Reference Range Established PH Final    POC Protein, Urine 11/14/2024 NEGATIVE  NEGATIVE, 30 (1+) mg/dl Final    POC Urobilinogen, Urine 11/14/2024 1.0  0.2, 1.0 EU/DL Final    Poc Nitrite, Urine 11/14/2024 NEGATIVE  NEGATIVE Final    POC Leukocytes, Urine 11/14/2024 NEGATIVE  NEGATIVE Final    Urine Culture 11/14/2024 No growth   Final     Assessment/Plan   Problem List Items Addressed This Visit       Acute right hip pain     We will order X-Ray of the Right hip for further evaluation. If the X-Ray is negative we will plan to get CT of the hip. Follow-up if persistent or worsening symptoms otherwise when necessary.         Relevant Orders    XR hip right with pelvis when performed 2 or 3 views    Chronic anemia     Stable, continue current medications and management.         Relevant Orders    CBC and  Auto Differential    Chronic idiopathic gout     Well-controlled, continue current medications and management.         Essential hypertension     Well-controlled, continue current medications and management.         Relevant Orders    Comprehensive Metabolic Panel    Hemoglobin A1C    Lipid Panel    Follow Up In Advanced [facility] - PCP - Established    Mixed hyperlipidemia     The nature of cardiac risk has been fully discussed with this patient. Discussed cardiovascular risk analysis and appropriate diet with the need for lifelong measures to reduce the risk. A regular exercise program is recommended to help achieve and maintain normal body weight, fitness and improve lipid balance. Patient education provided. They understand and agree with this course of treatment. They will return with new or worsening symptoms. Patient instructed to remain current with appropriate annual health maintenance.          Relevant Orders    Comprehensive Metabolic Panel    Hemoglobin A1C    Lipid Panel    Follow Up In [facility] - PCP - Established    Tendinitis of adductor muscle of right hip     We will order X-Ray of the Hip. We will plan to order CT if the X-Ray is negative.         Relevant Orders    XR hip right with pelvis when performed 2 or 3 views    Type 2 diabetes mellitus without complication, without long-term current use of insulin (Multi) - Primary     Diabetes Mellitus type II, under fair control.  1. Rx changes: None  2. Education: Reviewed ‘ABCs’ of diabetes management (respective goals in parentheses):  A1C (<7), blood pressure (<130/80), and cholesterol (LDL <100).  3. Compliance at present is estimated to be fair. Efforts to improve compliance (if necessary) will be directed at dietary modifications: and increased exercise.  4. Follow up: 4 months         Relevant Orders    Comprehensive Metabolic Panel    Hemoglobin A1C    Lipid Panel    Follow Up In [facility] - PCP - Established      Scribe Attestation  By signing my name below, I, Percy Stein   attest that this documentation has been prepared under the direction and in the presence of Roger Wakefield MD.

## 2024-11-22 ENCOUNTER — HOSPITAL ENCOUNTER (OUTPATIENT)
Dept: RADIOLOGY | Facility: CLINIC | Age: 79
Discharge: HOME | End: 2024-11-22
Payer: MEDICARE

## 2024-11-22 DIAGNOSIS — M87.051 AVASCULAR NECROSIS OF FEMORAL HEAD, RIGHT (MULTI): ICD-10-CM

## 2024-11-22 PROCEDURE — 73721 MRI JNT OF LWR EXTRE W/O DYE: CPT | Mod: RT

## 2024-11-25 ENCOUNTER — APPOINTMENT (OUTPATIENT)
Dept: ORTHOPEDIC SURGERY | Facility: CLINIC | Age: 79
End: 2024-11-25
Payer: MEDICARE

## 2024-11-25 DIAGNOSIS — M87.00 AVN (AVASCULAR NECROSIS OF BONE) (MULTI): ICD-10-CM

## 2024-11-25 DIAGNOSIS — Z96.612 STATUS POST REVERSE TOTAL ARTHROPLASTY OF LEFT SHOULDER: Primary | ICD-10-CM

## 2024-11-25 PROCEDURE — 99214 OFFICE O/P EST MOD 30 MIN: CPT | Performed by: ORTHOPAEDIC SURGERY

## 2024-11-25 PROCEDURE — 1123F ACP DISCUSS/DSCN MKR DOCD: CPT | Performed by: ORTHOPAEDIC SURGERY

## 2024-11-25 PROCEDURE — 23570 CLTX SCAPULAR FX W/O MNPJ: CPT | Performed by: ORTHOPAEDIC SURGERY

## 2024-11-25 NOTE — PROGRESS NOTES
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    Extremities: Right hip exam    The affected right hip was examined and inspected.  There was tenderness to touch along the groin and over the lateral aspect of the hip over the bursal area.  Hip joint occasionally displayed catching, locking and mechanical symptoms.    The skin was intact without breakdown or open wound.  Old incisions of present were all healed.  There was mild crepitus seen with internal and external rotation and range of motion without evidence of gross instability.    Inspection of the low back showed normal curvature integrity of the skin.  The strength and stability of the low back and ligaments were within normal limits.    There was a normal straight leg raise with no foot drop, numbness or tingling in the bilateral lower extremities.  Sensation, reflexes and pulses in the foot and ankle are preserved and present.  There was no obvious effusion.  Range of motion showed flexion to 90 degrees, abduction to 20 degrees, external rotation to 5 degrees and 0 degrees of internal rotation.  The patient had the ability to bear weight but with discomfort.  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

## 2024-11-29 PROCEDURE — RXMED WILLOW AMBULATORY MEDICATION CHARGE

## 2024-12-03 ENCOUNTER — OFFICE VISIT (OUTPATIENT)
Dept: PRIMARY CARE | Facility: CLINIC | Age: 79
End: 2024-12-03
Payer: MEDICARE

## 2024-12-03 ENCOUNTER — APPOINTMENT (OUTPATIENT)
Dept: PHARMACY | Facility: HOSPITAL | Age: 79
End: 2024-12-03
Payer: MEDICARE

## 2024-12-03 VITALS
RESPIRATION RATE: 17 BRPM | WEIGHT: 213 LBS | HEIGHT: 72 IN | OXYGEN SATURATION: 93 % | TEMPERATURE: 97.8 F | SYSTOLIC BLOOD PRESSURE: 110 MMHG | BODY MASS INDEX: 28.85 KG/M2 | HEART RATE: 64 BPM | DIASTOLIC BLOOD PRESSURE: 62 MMHG

## 2024-12-03 DIAGNOSIS — E11.9 TYPE 2 DIABETES MELLITUS WITHOUT COMPLICATION, WITHOUT LONG-TERM CURRENT USE OF INSULIN (MULTI): ICD-10-CM

## 2024-12-03 DIAGNOSIS — M70.61 TROCHANTERIC BURSITIS OF RIGHT HIP: ICD-10-CM

## 2024-12-03 DIAGNOSIS — M87.051 AVASCULAR NECROSIS OF RIGHT FEMORAL HEAD (MULTI): Primary | ICD-10-CM

## 2024-12-03 DIAGNOSIS — E11.3293 TYPE 2 DIABETES MELLITUS WITH BOTH EYES AFFECTED BY MILD NONPROLIFERATIVE RETINOPATHY WITHOUT MACULAR EDEMA, WITHOUT LONG-TERM CURRENT USE OF INSULIN: Primary | ICD-10-CM

## 2024-12-03 DIAGNOSIS — M76.891 TENDINITIS OF ADDUCTOR MUSCLE OF RIGHT HIP: ICD-10-CM

## 2024-12-03 PROCEDURE — 1036F TOBACCO NON-USER: CPT | Performed by: FAMILY MEDICINE

## 2024-12-03 PROCEDURE — 99213 OFFICE O/P EST LOW 20 MIN: CPT | Performed by: FAMILY MEDICINE

## 2024-12-03 PROCEDURE — 3078F DIAST BP <80 MM HG: CPT | Performed by: FAMILY MEDICINE

## 2024-12-03 PROCEDURE — 1159F MED LIST DOCD IN RCRD: CPT | Performed by: FAMILY MEDICINE

## 2024-12-03 PROCEDURE — 1160F RVW MEDS BY RX/DR IN RCRD: CPT | Performed by: FAMILY MEDICINE

## 2024-12-03 PROCEDURE — 3074F SYST BP LT 130 MM HG: CPT | Performed by: FAMILY MEDICINE

## 2024-12-03 PROCEDURE — 1123F ACP DISCUSS/DSCN MKR DOCD: CPT | Performed by: FAMILY MEDICINE

## 2024-12-03 PROCEDURE — G2211 COMPLEX E/M VISIT ADD ON: HCPCS | Performed by: FAMILY MEDICINE

## 2024-12-03 ASSESSMENT — ENCOUNTER SYMPTOMS
HEMATURIA: 0
CONSTIPATION: 0
ARTHRALGIAS: 1
TREMORS: 0
ABDOMINAL PAIN: 0
HIP PAIN: 1
MUSCLE WEAKNESS: 0
SHORTNESS OF BREATH: 0
COUGH: 0
TINGLING: 0
SORE THROAT: 0
DIARRHEA: 0
RHINORRHEA: 0
HEADACHES: 0
VOMITING: 0
FREQUENCY: 0
CHILLS: 0
NUMBNESS: 0
DYSURIA: 0
LOSS OF MOTION: 0
PALPITATIONS: 0
FEVER: 0
INABILITY TO BEAR WEIGHT: 0
DIZZINESS: 0
LOSS OF SENSATION: 0
WHEEZING: 0

## 2024-12-03 NOTE — PROGRESS NOTES
Subjective   Patient ID: Scottie Blandon is a 79 y.o. male who presents for Follow-up (Hip Pain and MRI Results) and Hip Pain.    Hip Pain   The pain is present in the right hip. The quality of the pain is described as aching and shooting. The pain has been Improving since onset. Pertinent negatives include no inability to bear weight, loss of motion, loss of sensation, muscle weakness, numbness or tingling. He reports no foreign bodies present. Treatments tried: has been sleep in recliner, instead of bed.        Review of Systems   Constitutional:  Negative for chills and fever.   HENT:  Negative for congestion, ear pain, nosebleeds, rhinorrhea and sore throat.    Respiratory:  Negative for cough, shortness of breath and wheezing.    Cardiovascular:  Negative for chest pain, palpitations and leg swelling.   Gastrointestinal:  Negative for abdominal pain, constipation, diarrhea and vomiting.   Genitourinary:  Negative for dysuria, frequency and hematuria.   Musculoskeletal:  Positive for arthralgias.   Neurological:  Negative for dizziness, tingling, tremors, numbness and headaches.       Objective   /62   Pulse 64   Temp 36.6 °C (97.8 °F)   Resp 17   Ht 1.829 m (6')   Wt 96.6 kg (213 lb)   SpO2 93%   BMI 28.89 kg/m²     Physical Exam  Constitutional:       General: He is not in acute distress.     Appearance: Normal appearance.   HENT:      Head: Normocephalic and atraumatic.      Mouth/Throat:      Mouth: Mucous membranes are moist.      Pharynx: Oropharynx is clear. No oropharyngeal exudate or posterior oropharyngeal erythema.   Eyes:      General: No scleral icterus.     Extraocular Movements: Extraocular movements intact.      Pupils: Pupils are equal, round, and reactive to light.   Cardiovascular:      Rate and Rhythm: Normal rate and regular rhythm.      Pulses: Normal pulses.      Heart sounds: No murmur heard.     No friction rub. No gallop.   Pulmonary:      Effort: Pulmonary effort is normal.       Breath sounds: No wheezing, rhonchi or rales.   Musculoskeletal:      Comments: Right hip: positives: pain with movement of hip and tenderness over greater trochanter and negatives: no pain with heel impact  pulses full         Skin:     General: Skin is warm.      Coloration: Skin is not jaundiced or pale.      Findings: No erythema or rash.   Neurological:      General: No focal deficit present.      Mental Status: He is alert and oriented to person, place, and time.      Cranial Nerves: No cranial nerve deficit.      Sensory: No sensory deficit.      Coordination: Coordination normal.      Gait: Gait normal.       === 11/22/24 ===    MR HIP RIGHT WO IV CONTRAST    - Impression -  1. Avascular necrosis of the right femoral head with minimal reactive  marrow change in the medullary bone without lexie collapse of the  subchondral bone plate evident at this time.  2. Mild right distal to insertional gluteus medius tendinosis  3. Bladder wall thickening with coarsening of the trabeculation and  bladder wall diverticuli. This could be related to chronic  cystitis/urinary retention. Clinical and laboratory correlation may  be helpful.    Signed by: Josafat Whelan 11/22/2024 4:34 PM  Dictation workstation:   [id number]   Assessment/Plan   Problem List Items Addressed This Visit       Tendinitis of adductor muscle of right hip     Improving based on symptoms and exam.  Continue established treatment plan.         Avascular necrosis of right femoral head (Multi) - Primary     We will Refer him to Orthopedics for further evaluation.         Relevant Orders    Referral to Orthopaedic Surgery    Trochanteric bursitis of right hip     Natural history and expected course discussed. Questions answered.  Educational materials distributed.  Home exercises discussed.  OTC analgesics as needed.  Orthopedics referral.           Scribe Attestation  By signing my name below, IJean Marie Scribe   attest that this  documentation has been prepared under the direction and in the presence of Roger Wakefield MD.

## 2024-12-03 NOTE — ASSESSMENT & PLAN NOTE
Natural history and expected course discussed. Questions answered.  Educational materials distributed.  Home exercises discussed.  OTC analgesics as needed.  Orthopedics referral.

## 2024-12-04 ENCOUNTER — PHARMACY VISIT (OUTPATIENT)
Dept: PHARMACY | Facility: CLINIC | Age: 79
End: 2024-12-04
Payer: MEDICARE

## 2024-12-16 PROCEDURE — RXMED WILLOW AMBULATORY MEDICATION CHARGE

## 2024-12-18 ENCOUNTER — PHARMACY VISIT (OUTPATIENT)
Dept: PHARMACY | Facility: CLINIC | Age: 79
End: 2024-12-18
Payer: MEDICARE

## 2024-12-20 PROCEDURE — RXMED WILLOW AMBULATORY MEDICATION CHARGE

## 2024-12-20 NOTE — ASSESSMENT & PLAN NOTE
ASSESSMENT  Patients diabetes is increasing with most recent A1c of 7.3% (goal < 7%). Glycemic control is estimated to still be at goal. Patient has been tolerating current therapy well, though may need some adjustment based on most recent A1c result.   Informed patient of approval of [facility] program renewal for . Will reach out to patient and wife next year for re-enrollment.      PATIENT GOALS   Fasting B - 130 mg/dL 2-HR Postprandial BG:   Less than 180 mg/dL A1c:   Less than 7.0 %      RECOMMENDATIONS/PLAN  CONTINUE all medications as prescribed for diabetes:  Januvia 100 mg daily  Farxiga 10 mg daily  Follow up: ~10 months for [facility] renewal by telephone. Encouraged patient to reach out with any concerns or questions prior to next appointment.

## 2024-12-20 NOTE — PROGRESS NOTES
Clinical Pharmacy Appointment    Patient ID: Scottie Blandon is a 79 y.o. male who presents for Diabetes.    Referring Provider: Roger Wakefield MD      Patient Assistance for Januvia & Farxiga approved through 11/25/25. Will have to be renewed prior to that date to prevent lapse in coverage. Medication(s) will be received at no cost to patient from [facility].     Subjective     Patient's wife, Marta, presents to Clinical Pharmacy team per PCP for re-enrollment into [facility] program. Patient's wife has brought in required paperwork and was informed of approval for renewal application.     Patient's wife notes no changes with Scottie's diabetes regimen since last visit with PCP.     Objective     There were no vitals taken for this visit.     Labs  Lab Results   Component Value Date    BILITOT 0.5 11/12/2024    CALCIUM 9.8 11/12/2024    CO2 28 11/12/2024     11/12/2024    CREATININE 0.97 11/12/2024    GLUCOSE 166 (H) 11/12/2024    ALKPHOS 55 11/12/2024    K 4.5 11/12/2024    PROT 6.9 11/12/2024     11/12/2024    AST 12 11/12/2024    ALT 10 11/12/2024    BUN 24 (H) 11/12/2024    ANIONGAP 13 11/12/2024    ALBUMIN 4.3 11/12/2024    GFRMALE >90 09/14/2023     Lab Results   Component Value Date    TRIG 218 (H) 11/12/2024    CHOL 190 11/12/2024    LDLCALC 92 11/12/2024    HDL 54.0 11/12/2024     Lab Results   Component Value Date    HGBA1C 7.3 (H) 11/12/2024       Current Outpatient Medications on File Prior to Visit   Medication Sig Dispense Refill    allopurinol (Zyloprim) 300 mg tablet Take 1 tablet by mouth once daily. 90 tablet 2    aspirin 81 mg EC tablet Take 1 tablet (81 mg) by mouth once daily.      blood sugar diagnostic (Blood Glucose Test) strip 3 times a day.      celecoxib (CeleBREX) 200 mg capsule Take 1 capsule (200 mg) by mouth once daily.      cyclobenzaprine (Flexeril) 10 mg tablet Take 1 tablet (10 mg) by mouth 3 times a day as needed for muscle spasms for up to 10 days. 30 tablet 0     dapagliflozin propanediol (Farxiga) 5 mg Take 1 tablet (5 mg) by mouth once daily. 30 tablet 2    fenofibrate (Triglide) 160 mg tablet TAKE 1 TABLET BY MOUTH ONCE DAILY 30 tablet 8    ferrous sulfate 324 mg (65 mg iron) EC tablet Take 1 tablet (65 mg) by mouth once daily with breakfast.      glipiZIDE (Glucotrol) 10 mg tablet TAKE 1 TABLET BY MOUTH TWICE DAILY BEFORE a meal 180 tablet 2    ibuprofen 200 mg tablet Take 1 tablet (200 mg) by mouth every 6 hours if needed for mild pain (1 - 3).      lancets 30 gauge misc in the morning, at noon, and at bedtime.      lisinopril 2.5 mg tablet TAKE 1 TABLET BY MOUTH ONCE DAILY 90 tablet 2    metFORMIN (Glucophage) 500 mg tablet Take 2 tablets (1,000 mg) by mouth 2 times a day with meals. 360 tablet 1    naloxone (Narcan) 4 mg/0.1 mL nasal spray Instill 1 spray intranasally for opiod overdose; repeat in 5 minutes if no response. 2 each 0    NIACIN, NIACINAMIDE, ORAL Take 1 tablet by mouth 2 times a day.      omeprazole (PriLOSEC) 20 mg DR capsule TAKE 1 TABLET BY MOUTH ONCE DAILY 90 capsule 2    OneTouch Delica Plus Lanc Dev lancing device 1 each if needed.      OneTouch Delica Plus Lancet 33 gauge misc       OneTouch Verio High Control solution       pravastatin (Pravachol) 80 mg tablet TAKE 1 TABLET BY MOUTH DAILY 90 tablet 1    SITagliptin phosphate (Januvia) 100 mg tablet Take 1 tablet (100 mg) by mouth once daily. 30 tablet 3    tamsulosin (Flomax) 0.4 mg 24 hr capsule TAKE 1 CAPSULE BY MOUTH ONCE DAILY 90 capsule 1     No current facility-administered medications on file prior to visit.        Assessment/Plan   Problem List Items Addressed This Visit             ICD-10-CM    Type 2 diabetes mellitus with both eyes affected by mild nonproliferative retinopathy without macular edema, without long-term current use of insulin - Primary E11.3293         ASSESSMENT  Patients diabetes is increasing with most recent A1c of 7.3% (goal < 7%). Glycemic control is estimated to  still be at goal. Patient has been tolerating current therapy well, though may need some adjustment based on most recent A1c result.   Informed patient of approval of [facility] program renewal for . Will reach out to patient and wife next year for re-enrollment.      PATIENT GOALS   Fasting B - 130 mg/dL 2-HR Postprandial BG:   Less than 180 mg/dL A1c:   Less than 7.0 %      RECOMMENDATIONS/PLAN  CONTINUE all medications as prescribed for diabetes:  Januvia 100 mg daily  Farxiga 10 mg daily  Follow up: ~10 months for [facility] renewal by telephone. Encouraged patient to reach out with any concerns or questions prior to next appointment.          Relevant Medications    dapagliflozin propanediol (Farxiga) 10 mg    SITagliptin phosphate (Januvia) 100 mg tablet    Other Relevant Orders    Referral to Clinical Pharmacy     Other Visit Diagnoses         Codes    Type 2 diabetes mellitus without complication, without long-term current use of insulin (Multi)     E11.9          Cyndi Christensen PharmD  Clinical Ambulatory Care Pharmacist  Ph: [phone]    Continue all meds under the continuation of care with the referring provider and clinical pharmacy team.    Clinical Pharmacist follow up: ~10 months for [facility] renewal or sooner as needed based on clinical intervention  Type of Encounter:  Telephone    Verbal consent to manage patient's drug therapy was obtained from the patient. They were informed they may decline to participate or withdraw from participation in pharmacy services at any time.

## 2024-12-23 ENCOUNTER — PHARMACY VISIT (OUTPATIENT)
Dept: PHARMACY | Facility: CLINIC | Age: 79
End: 2024-12-23
Payer: MEDICARE

## 2024-12-23 ENCOUNTER — APPOINTMENT (OUTPATIENT)
Dept: ORTHOPEDIC SURGERY | Facility: CLINIC | Age: 79
End: 2024-12-23
Payer: MEDICARE

## 2024-12-23 ENCOUNTER — HOSPITAL ENCOUNTER (OUTPATIENT)
Dept: RADIOLOGY | Facility: HOSPITAL | Age: 79
Discharge: HOME | End: 2024-12-23
Payer: MEDICARE

## 2024-12-23 DIAGNOSIS — M25.551 PAIN OF RIGHT HIP: ICD-10-CM

## 2024-12-23 DIAGNOSIS — M70.61 TROCHANTERIC BURSITIS OF RIGHT HIP: ICD-10-CM

## 2024-12-23 DIAGNOSIS — M12.812 ROTATOR CUFF ARTHROPATHY, LEFT: ICD-10-CM

## 2024-12-23 DIAGNOSIS — M87.051 AVASCULAR NECROSIS OF BONE OF RIGHT HIP (MULTI): Primary | ICD-10-CM

## 2024-12-23 PROCEDURE — 99214 OFFICE O/P EST MOD 30 MIN: CPT | Performed by: ORTHOPAEDIC SURGERY

## 2024-12-23 PROCEDURE — 73030 X-RAY EXAM OF SHOULDER: CPT | Mod: LT

## 2024-12-23 PROCEDURE — 1123F ACP DISCUSS/DSCN MKR DOCD: CPT | Performed by: ORTHOPAEDIC SURGERY

## 2024-12-23 PROCEDURE — 73030 X-RAY EXAM OF SHOULDER: CPT | Mod: LEFT SIDE | Performed by: RADIOLOGY

## 2024-12-23 PROCEDURE — 20610 DRAIN/INJ JOINT/BURSA W/O US: CPT | Performed by: ORTHOPAEDIC SURGERY

## 2024-12-23 RX ORDER — LIDOCAINE HYDROCHLORIDE 10 MG/ML
5 INJECTION, SOLUTION INFILTRATION; PERINEURAL
Status: COMPLETED | OUTPATIENT
Start: 2024-12-23 | End: 2024-12-23

## 2024-12-23 RX ORDER — BETAMETHASONE SODIUM PHOSPHATE AND BETAMETHASONE ACETATE 3; 3 MG/ML; MG/ML
2 INJECTION, SUSPENSION INTRA-ARTICULAR; INTRALESIONAL; INTRAMUSCULAR; SOFT TISSUE
Status: COMPLETED | OUTPATIENT
Start: 2024-12-23 | End: 2024-12-23

## 2024-12-23 NOTE — PROGRESS NOTES
History of present illness: Patient with a history of right shoulder replacement in May he had an acromial stress fracture that is gone on to heal not much pain at all left shoulder distal little soreness and stiffness he is also here today for his right hip    Physical exam:    General: No acute distress or breathing difficulty or discomfort, pleasant and cooperative with the examination.    Extremities: Left shoulder flexes well goes up beyond 90 abduct to 60 reasonable push pull he does not have much pain strength returning he will continue self-guided exercises with left shoulder      Right hip exam    Recently had right hip and groin pain led to x-rays and MRI now he has fairly significant hip bursal pain right side    He had a soft tissue injection a couple weeks ago without much relief but not an injection into the hip bursal bone laterally      The affected right hip was examined and inspected.  There was tenderness to touch along the groin and over the lateral aspect of the hip over the bursal area.  Hip joint moves freely.    There was no pain over the groin area to internal/external rotation abduction.    Palpable reproducible pain was reproduced with palpation over the lateral trochanteric process.  There was a tight IT band with a positive Emelyn sign.      The skin was intact without breakdown or open wound.  Old incisions of present were all healed.  There was mild crepitus seen with internal and external rotation and range of motion without evidence of gross instability.    Inspection of the low back showed normal curvature integrity of the skin.  The strength and stability of the low back and ligaments were within normal limits.    There was a normal straight leg raise with no foot drop, numbness or tingling in the bilateral lower extremities.  Sensation, reflexes and pulses in the foot and ankle are preserved and present.  There was no obvious effusion.    Range of motion showed flexion to beyond 100  degrees degrees, abduction to 30 degrees, external rotation to 15 degrees and 18 degrees of internal rotation.  The patient had the ability to bear weight but with discomfort.  The patient's gait antalgic  secondary to discomfort      Before aspiration injection the risks of a cortisone injection including infection, local skin irritation, skin atrophy, calcification, continued pain and discomfort, elevated blood sugar, burning, failure to relieve pain, possible late infection were discussed with the patient.    Postprocedure discomfort can be alleviated with additional medications, ice, elevation, rest over the first 24 hours as recommended.    Patient verbalized understanding and wanted to proceed with the planned procedure.    After informed consent was provided and allergies verified, the patient was positioned appropriately on the  bed.  The left hip to be aspirated and injected was prepped and draped in a sterile fashion.  The skin was anesthetized with ethyl chloride spray.  A joint aspiration was to be performed an 18-gauge needle was used otherwise a 22-gauge needle was used to inject the appropriate joint.    Joint injection was performed with a mixture of 5 cc 1% lidocaine plain and 2 cc Celestone Soluspan 6 mg per mL.  The needle was removed and the puncture site closed and sealed with a Band-Aid.  The patient tolerated the procedure well.    Diagnostic studies: We reviewed his shoulder x-rays showing a healed acromial stress fracture left side    Hip x-rays show a little bit of arthritis and MRI shows avascular necrosis right to the edge of the weightbearing surface of the hip joint proper without clinical fracture or break    Impression: Trochanteric bursitis, avascular necrosis with erosive change right to the edge of the femoral head weightbearing surface without clinical breakthrough    Plan: Hip bursa injection for [facility] pain therapy stretching program fortunately the groin pain has diminished  significantly    Due to his age the structure and location of the avascular necrosis right to the weightbearing surface of the superior femoral head I would not recommend core decompression in someone his age that could lead to more catastrophic collapse    Will follow him clinically see how he does over the next several weeks see him back in 3 to 4 weeks if he gets more pain groin pain or pain returns he may need to consider arthroplasty hopefully can avoid that for years to come in the meantime his shoulder is healed up nicely and we will do self-guided stretching range of motion program    L Inj/Asp: R greater trochanteric bursa on 12/23/2024 3:01 PM  Indications: pain and diagnostic evaluation  Details: 25 G needle, lateral approach  Medications: 5 mL lidocaine 10 mg/mL (1 %); 2 mL betamethasone acet,sod phos 6 mg/mL  Outcome: tolerated well, no immediate complications  Procedure, treatment alternatives, risks and benefits explained, specific risks discussed. Consent was given by the patient. Immediately prior to procedure a time out was called to verify the correct patient, procedure, equipment, support staff and site/side marked as required. Patient was prepped and draped in the usual sterile fashion.

## 2025-01-06 ENCOUNTER — APPOINTMENT (OUTPATIENT)
Dept: ORTHOPEDIC SURGERY | Facility: CLINIC | Age: 80
End: 2025-01-06
Payer: MEDICARE

## 2025-01-06 DIAGNOSIS — M70.61 TROCHANTERIC BURSITIS OF RIGHT HIP: Primary | ICD-10-CM

## 2025-01-06 DIAGNOSIS — M87.051 AVASCULAR NECROSIS OF RIGHT FEMORAL HEAD (MULTI): ICD-10-CM

## 2025-01-06 PROCEDURE — 1123F ACP DISCUSS/DSCN MKR DOCD: CPT | Performed by: ORTHOPAEDIC SURGERY

## 2025-01-06 PROCEDURE — 99212 OFFICE O/P EST SF 10 MIN: CPT | Performed by: ORTHOPAEDIC SURGERY

## 2025-01-06 NOTE — PROGRESS NOTES
History of present illness: Right hip resolving trochanteric bursitis after injection therapy and stretch    Physical exam:    General: No acute distress or breathing difficulty or discomfort, pleasant and cooperative with the examination.    Extremities: Right hip injection site clean and dry    Negative Emelyn sign    Good internal ex rotation good flexion extension good abduction good rotation good straight leg raise no neurologic symptoms no radiculopathy no pain to speak of        Diagnostic studies: No new study    Impression: Resolving hip trochanteric bursitis    Plan: Follow-up as needed for additional treatment

## 2025-01-15 DIAGNOSIS — E11.9 TYPE 2 DIABETES MELLITUS WITHOUT COMPLICATION, WITHOUT LONG-TERM CURRENT USE OF INSULIN (MULTI): ICD-10-CM

## 2025-01-21 PROCEDURE — RXMED WILLOW AMBULATORY MEDICATION CHARGE

## 2025-01-21 RX ORDER — DAPAGLIFLOZIN 5 MG/1
5 TABLET, FILM COATED ORAL DAILY
Qty: 30 TABLET | Refills: 2 | Status: SHIPPED | OUTPATIENT
Start: 2025-01-21 | End: 2025-04-21

## 2025-01-23 ENCOUNTER — PHARMACY VISIT (OUTPATIENT)
Dept: PHARMACY | Facility: CLINIC | Age: 80
End: 2025-01-23
Payer: MEDICARE

## 2025-02-12 PROCEDURE — RXMED WILLOW AMBULATORY MEDICATION CHARGE

## 2025-02-13 ENCOUNTER — PHARMACY VISIT (OUTPATIENT)
Dept: PHARMACY | Facility: CLINIC | Age: 80
End: 2025-02-13
Payer: MEDICARE

## 2025-02-14 ENCOUNTER — APPOINTMENT (OUTPATIENT)
Dept: CARDIOLOGY | Facility: CLINIC | Age: 80
End: 2025-02-14
Payer: MEDICARE

## 2025-02-17 PROCEDURE — RXMED WILLOW AMBULATORY MEDICATION CHARGE

## 2025-02-18 ENCOUNTER — PHARMACY VISIT (OUTPATIENT)
Dept: PHARMACY | Facility: CLINIC | Age: 80
End: 2025-02-18
Payer: MEDICARE

## 2025-03-17 ENCOUNTER — TELEPHONE (OUTPATIENT)
Dept: PRIMARY CARE | Facility: CLINIC | Age: 80
End: 2025-03-17

## 2025-03-17 PROCEDURE — RXMED WILLOW AMBULATORY MEDICATION CHARGE

## 2025-03-17 NOTE — TELEPHONE ENCOUNTER
Rx Refill Request Telephone Encounter    Name:  Scottie Blandon  :  [id number]  Medication Name:  SITagliptin phosphate (Januvia) 100 mg   Specific Pharmacy location:  [facility]  Date of last appointment:  2024  Date of next appointment:  3/21  Best number to reach patient:  [phone]

## 2025-03-19 ENCOUNTER — PHARMACY VISIT (OUTPATIENT)
Dept: PHARMACY | Facility: CLINIC | Age: 80
End: 2025-03-19
Payer: MEDICARE

## 2025-03-19 DIAGNOSIS — E11.9 TYPE 2 DIABETES MELLITUS WITHOUT COMPLICATION, WITHOUT LONG-TERM CURRENT USE OF INSULIN (MULTI): ICD-10-CM

## 2025-03-19 LAB
ALBUMIN SERPL-MCNC: 4.3 G/DL (ref 3.6–5.1)
ALP SERPL-CCNC: 56 U/L (ref 35–144)
ALT SERPL-CCNC: 9 U/L (ref 9–46)
ANION GAP SERPL CALCULATED.4IONS-SCNC: 10 MMOL/L (CALC) (ref 7–17)
AST SERPL-CCNC: 11 U/L (ref 10–35)
BASOPHILS # BLD AUTO: 42 CELLS/UL (ref 0–200)
BASOPHILS NFR BLD AUTO: 0.7 %
BILIRUB SERPL-MCNC: 0.6 MG/DL (ref 0.2–1.2)
BUN SERPL-MCNC: 14 MG/DL (ref 7–25)
CALCIUM SERPL-MCNC: 9.7 MG/DL (ref 8.6–10.3)
CHLORIDE SERPL-SCNC: 103 MMOL/L (ref 98–110)
CHOLEST SERPL-MCNC: 213 MG/DL
CHOLEST/HDLC SERPL: 3.9 (CALC)
CO2 SERPL-SCNC: 26 MMOL/L (ref 20–32)
CREAT SERPL-MCNC: 0.76 MG/DL (ref 0.7–1.28)
EGFRCR SERPLBLD CKD-EPI 2021: 93 ML/MIN/1.73M2
EOSINOPHIL # BLD AUTO: 102 CELLS/UL (ref 15–500)
EOSINOPHIL NFR BLD AUTO: 1.7 %
ERYTHROCYTE [DISTWIDTH] IN BLOOD BY AUTOMATED COUNT: 13.1 % (ref 11–15)
EST. AVERAGE GLUCOSE BLD GHB EST-MCNC: 183 MG/DL
EST. AVERAGE GLUCOSE BLD GHB EST-SCNC: 10.1 MMOL/L
GLUCOSE SERPL-MCNC: 163 MG/DL (ref 65–99)
HBA1C MFR BLD: 8 % OF TOTAL HGB
HCT VFR BLD AUTO: 43.6 % (ref 38.5–50)
HDLC SERPL-MCNC: 55 MG/DL
HGB BLD-MCNC: 14.3 G/DL (ref 13.2–17.1)
LDLC SERPL CALC-MCNC: 118 MG/DL (CALC)
LYMPHOCYTES # BLD AUTO: 1464 CELLS/UL (ref 850–3900)
LYMPHOCYTES NFR BLD AUTO: 24.4 %
MCH RBC QN AUTO: 29.6 PG (ref 27–33)
MCHC RBC AUTO-ENTMCNC: 32.8 G/DL (ref 32–36)
MCV RBC AUTO: 90.3 FL (ref 80–100)
MONOCYTES # BLD AUTO: 582 CELLS/UL (ref 200–950)
MONOCYTES NFR BLD AUTO: 9.7 %
NEUTROPHILS # BLD AUTO: 3810 CELLS/UL (ref 1500–7800)
NEUTROPHILS NFR BLD AUTO: 63.5 %
NONHDLC SERPL-MCNC: 158 MG/DL (CALC)
PLATELET # BLD AUTO: 211 THOUSAND/UL (ref 140–400)
PMV BLD REES-ECKER: 10.8 FL (ref 7.5–12.5)
POTASSIUM SERPL-SCNC: 4 MMOL/L (ref 3.5–5.3)
PROT SERPL-MCNC: 6.8 G/DL (ref 6.1–8.1)
RBC # BLD AUTO: 4.83 MILLION/UL (ref 4.2–5.8)
SODIUM SERPL-SCNC: 139 MMOL/L (ref 135–146)
TRIGL SERPL-MCNC: 279 MG/DL
WBC # BLD AUTO: 6 THOUSAND/UL (ref 3.8–10.8)

## 2025-03-19 RX ORDER — METFORMIN HYDROCHLORIDE 500 MG/1
1000 TABLET ORAL
Qty: 360 TABLET | Refills: 1 | Status: SHIPPED | OUTPATIENT
Start: 2025-03-19

## 2025-03-19 NOTE — TELEPHONE ENCOUNTER
Rx Refill Request     Name: Scottie Blandon  :  1945     Date of last appointment:  12/3/2024   Date of next appointment:  3/21/2025   Best number to reach patient:  [phone]

## 2025-03-21 ENCOUNTER — APPOINTMENT (OUTPATIENT)
Dept: PRIMARY CARE | Facility: CLINIC | Age: 80
End: 2025-03-21
Payer: MEDICARE

## 2025-03-21 VITALS
RESPIRATION RATE: 18 BRPM | OXYGEN SATURATION: 98 % | BODY MASS INDEX: 29.53 KG/M2 | DIASTOLIC BLOOD PRESSURE: 80 MMHG | HEART RATE: 74 BPM | HEIGHT: 72 IN | SYSTOLIC BLOOD PRESSURE: 128 MMHG | TEMPERATURE: 97.1 F | WEIGHT: 218 LBS

## 2025-03-21 DIAGNOSIS — E78.2 MIXED HYPERLIPIDEMIA: ICD-10-CM

## 2025-03-21 DIAGNOSIS — E11.3293 TYPE 2 DIABETES MELLITUS WITH BOTH EYES AFFECTED BY MILD NONPROLIFERATIVE RETINOPATHY WITHOUT MACULAR EDEMA, WITHOUT LONG-TERM CURRENT USE OF INSULIN: ICD-10-CM

## 2025-03-21 DIAGNOSIS — Z00.00 ROUTINE GENERAL MEDICAL EXAMINATION AT HEALTH CARE FACILITY: Primary | ICD-10-CM

## 2025-03-21 DIAGNOSIS — N40.1 BENIGN PROSTATIC HYPERPLASIA WITH URINARY OBSTRUCTION: ICD-10-CM

## 2025-03-21 DIAGNOSIS — I71.40 ABDOMINAL AORTIC ANEURYSM (AAA) WITHOUT RUPTURE, UNSPECIFIED PART (CMS-HCC): ICD-10-CM

## 2025-03-21 DIAGNOSIS — I10 ESSENTIAL HYPERTENSION: ICD-10-CM

## 2025-03-21 DIAGNOSIS — N13.8 BENIGN PROSTATIC HYPERPLASIA WITH URINARY OBSTRUCTION: ICD-10-CM

## 2025-03-21 PROCEDURE — RXMED WILLOW AMBULATORY MEDICATION CHARGE

## 2025-03-21 RX ORDER — DAPAGLIFLOZIN 5 MG/1
5 TABLET, FILM COATED ORAL DAILY
Qty: 30 TABLET | Refills: 2 | Status: CANCELLED | OUTPATIENT
Start: 2025-03-21 | End: 2025-06-19

## 2025-03-21 RX ORDER — TAMSULOSIN HYDROCHLORIDE 0.4 MG/1
0.4 CAPSULE ORAL DAILY
Qty: 90 CAPSULE | Refills: 1 | Status: SHIPPED | OUTPATIENT
Start: 2025-03-21

## 2025-03-21 RX ORDER — FENOFIBRATE 160 MG/1
160 TABLET ORAL DAILY
Qty: 90 TABLET | Refills: 1 | Status: SHIPPED | OUTPATIENT
Start: 2025-03-21

## 2025-03-21 RX ORDER — DAPAGLIFLOZIN 10 MG/1
10 TABLET, FILM COATED ORAL DAILY
Qty: 90 TABLET | Refills: 1 | Status: SHIPPED | OUTPATIENT
Start: 2025-03-21 | End: 2025-03-21 | Stop reason: SDUPTHER

## 2025-03-21 RX ORDER — PRAVASTATIN SODIUM 80 MG/1
80 TABLET ORAL DAILY
Qty: 90 TABLET | Refills: 1 | Status: SHIPPED | OUTPATIENT
Start: 2025-03-21

## 2025-03-21 ASSESSMENT — ACTIVITIES OF DAILY LIVING (ADL)
DOING_HOUSEWORK: INDEPENDENT
DRESSING: INDEPENDENT
MANAGING_FINANCES: INDEPENDENT
GROCERY_SHOPPING: INDEPENDENT
BATHING: INDEPENDENT
TAKING_MEDICATION: INDEPENDENT

## 2025-03-21 ASSESSMENT — ENCOUNTER SYMPTOMS
WEAKNESS: 0
BLURRED VISION: 0
POLYPHAGIA: 0
VISUAL CHANGE: 0
FATIGUE: 0
POLYDIPSIA: 0

## 2025-03-21 NOTE — TELEPHONE ENCOUNTER
I reached out to Pharmacist Cyndi to confirm that they received refill requests for the patient Farxiga. She does confirm that she did receive refill requests for the patients medications today. She will send these to [facility] pharmacy. They will contact the patient before they are mailed out.

## 2025-03-21 NOTE — PROGRESS NOTES
Subjective   Patient ID: Scottie Blandon is a 79 y.o. male who presents for Medicare Annual Wellness Visit Subsequent and Follow-up (Diabetes, Hypertension, Hyperlipidemia and labs).    He presents for Annual Medicare Wellness Visit and follow-up on hypertension and hyperlipidemia. He has no chest pain, dyspnea, exertional chest pressure/discomfort, near-syncope, orthopnea, palpitations, paroxysmal nocturnal dyspnea, and syncope. Taking his medication regularly with no side effects.    Diabetes  He presents for his follow-up diabetic visit. He has type 2 diabetes mellitus. His disease course has been worsening. There are no hypoglycemic associated symptoms. Pertinent negatives for hypoglycemia include no dizziness, headaches or tremors. Pertinent negatives for diabetes include no blurred vision, no chest pain, no fatigue, no foot paresthesias, no foot ulcerations, no polydipsia, no polyphagia, no polyuria, no visual change and no weakness. Risk factors for coronary artery disease include diabetes mellitus, hypertension, dyslipidemia, male sex and obesity.     Past Medical, Surgical, and Family History reviewed and updated in chart.         Patient Self Assessment of Health Status  Patient Self Assessment: Fair    Nutrition and Exercise  Current Diet: Unhealthy Diet  Adequate Fluid Intake: Yes  Caffeine: Yes  Exercise Frequency: No Exercise    Functional Ability/Level of Safety  Cognitive Impairment Observed: No cognitive impairment observed    Home Safety Risk Factors: None     Review of Systems   Constitutional:  Negative for chills, fatigue and fever.   HENT:  Negative for congestion, ear pain, nosebleeds, rhinorrhea and sore throat.    Eyes:  Negative for blurred vision.   Respiratory:  Negative for cough, shortness of breath and wheezing.    Cardiovascular:  Negative for chest pain, palpitations and leg swelling.   Gastrointestinal:  Negative for abdominal pain, constipation, diarrhea and vomiting.   Endocrine:  Negative for cold intolerance, heat intolerance, polydipsia, polyphagia and polyuria.   Genitourinary:  Negative for dysuria, frequency and hematuria.   Musculoskeletal:  Negative for arthralgias and back pain.   Neurological:  Negative for dizziness, tremors, weakness, numbness and headaches.   Hematological:  Negative for adenopathy. Does not bruise/bleed easily.       Objective   /80   Pulse 74   Temp 36.2 °C (97.1 °F)   Resp 18   Ht 1.829 m (6')   Wt 98.9 kg (218 lb)   SpO2 98%   BMI 29.57 kg/m²     Physical Exam  Constitutional:       General: He is not in acute distress.     Appearance: Normal appearance.   HENT:      Head: Normocephalic and atraumatic.      Mouth/Throat:      Mouth: Mucous membranes are moist.      Pharynx: Oropharynx is clear. No oropharyngeal exudate or posterior oropharyngeal erythema.   Eyes:      General: No scleral icterus.     Extraocular Movements: Extraocular movements intact.      Pupils: Pupils are equal, round, and reactive to light.   Cardiovascular:      Rate and Rhythm: Normal rate and regular rhythm.      Pulses: Normal pulses.      Heart sounds: No murmur heard.     No friction rub. No gallop.   Pulmonary:      Effort: Pulmonary effort is normal.      Breath sounds: No wheezing, rhonchi or rales.   Musculoskeletal:      Cervical back: Normal range of motion and neck supple.   Skin:     General: Skin is warm.      Coloration: Skin is not jaundiced or pale.      Findings: No erythema or rash.   Neurological:      General: No focal deficit present.      Mental Status: He is alert and oriented to person, place, and time.      Cranial Nerves: No cranial nerve deficit.      Sensory: No sensory deficit.      Coordination: Coordination normal.      Gait: Gait normal.         Orders Only on 03/18/2025   Component Date Value Ref Range Status    CHOLESTEROL, TOTAL 03/18/2025 213 (H)  <200 mg/dL Final    HDL CHOLESTEROL 03/18/2025 55  > OR = 40 mg/dL Final    TRIGLYCERIDES  03/18/2025 279 (H)  <150 mg/dL Final    Comment:    If a non-fasting specimen was collected, consider  repeat triglyceride testing on a fasting specimen  if clinically indicated.   Margot et al. J. of Clin. Lipidol. 2015;9:129-169.         LDL-CHOLESTEROL 03/18/2025 118 (H)  mg/dL (calc) Final    Comment: Reference range: <100     Desirable range <100 mg/dL for primary prevention;    <70 mg/dL for patients with CHD or diabetic patients   with > or = 2 CHD risk factors.     LDL-C is now calculated using the Lux-Hartmann   calculation, which is a validated novel method providing   better accuracy than the Friedewald equation in the   estimation of LDL-C.   Lux SS et al. MICHAELLE. 2013;310(19): 0524-0928   (http://education.[employer].Disconnect/faq/ILX190)      CHOL/HDLC RATIO 03/18/2025 3.9  <5.0 (calc) Final    NON HDL CHOLESTEROL 03/18/2025 158 (H)  <130 mg/dL (calc) Final    Comment: For patients with diabetes plus 1 major ASCVD risk   factor, treating to a non-HDL-C goal of <100 mg/dL   (LDL-C of <70 mg/dL) is considered a therapeutic   option.      GLUCOSE 03/18/2025 163 (H)  65 - 99 mg/dL Final    Comment:               Fasting reference interval     For someone without known diabetes, a glucose  value >125 mg/dL indicates that they may have  diabetes and this should be confirmed with a  follow-up test.         UREA NITROGEN (BUN) 03/18/2025 14  7 - 25 mg/dL Final    CREATININE 03/18/2025 0.76  0.70 - 1.28 mg/dL Final    EGFR 03/18/2025 93  > OR = 60 mL/min/1.73m2 Final    SODIUM 03/18/2025 139  135 - 146 mmol/L Final    POTASSIUM 03/18/2025 4.0  3.5 - 5.3 mmol/L Final    CHLORIDE 03/18/2025 103  98 - 110 mmol/L Final    CARBON DIOXIDE 03/18/2025 26  20 - 32 mmol/L Final    ELECTROLYTE BALANCE 03/18/2025 10  7 - 17 mmol/L (calc) Final    CALCIUM 03/18/2025 9.7  8.6 - 10.3 mg/dL Final    PROTEIN, TOTAL 03/18/2025 6.8  6.1 - 8.1 g/dL Final    ALBUMIN 03/18/2025 4.3  3.6 - 5.1 g/dL Final    BILIRUBIN, TOTAL  03/18/2025 0.6  0.2 - 1.2 mg/dL Final    ALKALINE PHOSPHATASE 03/18/2025 56  35 - 144 U/L Final    AST 03/18/2025 11  10 - 35 U/L Final    ALT 03/18/2025 9  9 - 46 U/L Final    WHITE BLOOD CELL COUNT 03/18/2025 6.0  3.8 - 10.8 Thousand/uL Final    RED BLOOD CELL COUNT 03/18/2025 4.83  4.20 - 5.80 Million/uL Final    HEMOGLOBIN 03/18/2025 14.3  13.2 - 17.1 g/dL Final    HEMATOCRIT 03/18/2025 43.6  38.5 - 50.0 % Final    MCV 03/18/2025 90.3  80.0 - 100.0 fL Final    MCH 03/18/2025 29.6  27.0 - 33.0 pg Final    MCHC 03/18/2025 32.8  32.0 - 36.0 g/dL Final    Comment: For adults, a slight decrease in the calculated MCHC  value (in the range of 30 to 32 g/dL) is most likely  not clinically significant; however, it should be  interpreted with caution in correlation with other  red cell parameters and the patient's clinical  condition.      RDW 03/18/2025 13.1  11.0 - 15.0 % Final    PLATELET COUNT 03/18/2025 211  140 - 400 Thousand/uL Final    MPV 03/18/2025 10.8  7.5 - 12.5 fL Final    ABSOLUTE NEUTROPHILS 03/18/2025 3,810  1,500 - 7,800 cells/uL Final    ABSOLUTE LYMPHOCYTES 03/18/2025 1,464  850 - 3,900 cells/uL Final    ABSOLUTE MONOCYTES 03/18/2025 582  200 - 950 cells/uL Final    ABSOLUTE EOSINOPHILS 03/18/2025 102  15 - 500 cells/uL Final    ABSOLUTE BASOPHILS 03/18/2025 42  0 - 200 cells/uL Final    NEUTROPHILS 03/18/2025 63.5  % Final    LYMPHOCYTES 03/18/2025 24.4  % Final    MONOCYTES 03/18/2025 9.7  % Final    EOSINOPHILS 03/18/2025 1.7  % Final    BASOPHILS 03/18/2025 0.7  % Final    HEMOGLOBIN A1c 03/18/2025 8.0 (H)  <5.7 % of total Hgb Final    Comment: For someone without known diabetes, a hemoglobin A1c  value of 6.5% or greater indicates that they may have   diabetes and this should be confirmed with a follow-up   test.     For someone with known diabetes, a value <7% indicates   that their diabetes is well controlled and a value   greater than or equal to 7% indicates suboptimal   control. A1c targets  should be individualized based on   duration of diabetes, age, comorbid conditions, and   other considerations.     Currently, no consensus exists regarding use of  hemoglobin A1c for diagnosis of diabetes for children.          eAG (mg/dL) 03/18/2025 183  mg/dL Final    eAG (mmol/L) 03/18/2025 10.1  mmol/L Final       Assessment/Plan   Problem List Items Addressed This Visit       Benign prostatic hyperplasia with urinary obstruction     Stable, continue current medications and management.         Relevant Medications    tamsulosin (Flomax) 0.4 mg 24 hr capsule    Other Relevant Orders    Follow Up In [facility] - PCP    Essential hypertension     Well-controlled, continue current medications and management.         Relevant Orders    Comprehensive Metabolic Panel    Follow Up In [facility] - PCP    Hemoglobin A1C    Albumin-Creatinine Ratio, Urine Random    Lipid Panel    Mixed hyperlipidemia     The nature of cardiac risk has been fully discussed with this patient. Discussed cardiovascular risk analysis and appropriate diet with the need for lifelong measures to reduce the risk. A regular exercise program is recommended to help achieve and maintain normal body weight, fitness and improve lipid balance. Patient education provided. They understand and agree with this course of treatment. They will return with new or worsening symptoms. Patient instructed to remain current with appropriate annual health maintenance.          Relevant Medications    fenofibrate (Triglide) 160 mg tablet    pravastatin (Pravachol) 80 mg tablet    Other Relevant Orders    Comprehensive Metabolic Panel    Follow Up In [facility] - PCP    Hemoglobin A1C    Albumin-Creatinine Ratio, Urine Random    Lipid Panel    Abdominal aortic aneurysm, without rupture, unspecified (CMS-HCC)     Chronic Condition Documentation : Stable based on symptoms and exam.  Continue established treatment plan and follow-up at  least yearly.         Routine general medical examination at health care facility - Primary     Recommend low-cholesterol diet, low-fat diet and low-salt diet.  The need for lifelong dietary compliance in order to reduce cardiac risk is recommended.  We will also recommend regular exercise program to improve lipid balance and overall health.  Recommend decreasing fat and cholesterol in diet, increasing aerobic exercise with a goal of 4 or more days per week         Type 2 diabetes mellitus with both eyes affected by mild nonproliferative retinopathy without macular edema, without long-term current use of insulin     Diabetes Mellitus type II, under poor control.  1. Rx changes: Increase Farxiga to 10 mg daily.  2. Education: Reviewed ‘ABCs’ of diabetes management (respective goals in parentheses):  A1C (<7), blood pressure (<130/80), and cholesterol (LDL <100).  3. Compliance at present is estimated to be poor. Efforts to improve compliance (if necessary) will be directed at dietary modifications: and increased exercise.  4. Follow up: 3 months         Relevant Orders    Comprehensive Metabolic Panel    Follow Up In [facility] - PCP    Hemoglobin A1C    Albumin-Creatinine Ratio, Urine Random    Lipid Panel     Scribe Attestation  By signing my name below, IJean Marie Scribtad   attest that this documentation has been prepared under the direction and in the presence of Roger Wakefield MD.

## 2025-03-21 NOTE — ASSESSMENT & PLAN NOTE
Diabetes Mellitus type II, under poor control.  1. Rx changes: Increase Farxiga to 10 mg daily.  2. Education: Reviewed ‘ABCs’ of diabetes management (respective goals in parentheses):  A1C (<7), blood pressure (<130/80), and cholesterol (LDL <100).  3. Compliance at present is estimated to be poor. Efforts to improve compliance (if necessary) will be directed at dietary modifications: and increased exercise.  4. Follow up: 3 months

## 2025-03-22 PROBLEM — M87.051 AVASCULAR NECROSIS OF RIGHT FEMORAL HEAD (MULTI): Status: RESOLVED | Noted: 2024-12-03 | Resolved: 2025-03-22

## 2025-03-22 ASSESSMENT — ENCOUNTER SYMPTOMS
BRUISES/BLEEDS EASILY: 0
TREMORS: 0
WHEEZING: 0
HEMATURIA: 0
PALPITATIONS: 0
RHINORRHEA: 0
COUGH: 0
FREQUENCY: 0
VOMITING: 0
NUMBNESS: 0
HEADACHES: 0
FEVER: 0
DYSURIA: 0
SHORTNESS OF BREATH: 0
CHILLS: 0
ABDOMINAL PAIN: 0
DIARRHEA: 0
BACK PAIN: 0
SORE THROAT: 0
ARTHRALGIAS: 0
ADENOPATHY: 0
CONSTIPATION: 0
DIZZINESS: 0

## 2025-03-27 ENCOUNTER — PHARMACY VISIT (OUTPATIENT)
Dept: PHARMACY | Facility: CLINIC | Age: 80
End: 2025-03-27
Payer: MEDICARE

## 2025-05-13 ENCOUNTER — APPOINTMENT (OUTPATIENT)
Dept: CARDIOLOGY | Facility: CLINIC | Age: 80
End: 2025-05-13
Payer: MEDICARE

## 2025-05-19 ENCOUNTER — APPOINTMENT (OUTPATIENT)
Dept: ORTHOPEDIC SURGERY | Facility: CLINIC | Age: 80
End: 2025-05-19
Payer: MEDICARE

## 2025-05-21 ENCOUNTER — APPOINTMENT (OUTPATIENT)
Dept: CARDIOLOGY | Facility: CLINIC | Age: 80
End: 2025-05-21
Payer: MEDICARE

## 2025-06-18 LAB
ALBUMIN SERPL-MCNC: 4.7 G/DL (ref 3.6–5.1)
ALBUMIN/CREAT UR: 21 MG/G CREAT
ALP SERPL-CCNC: 52 U/L (ref 35–144)
ALT SERPL-CCNC: 12 U/L (ref 9–46)
ANION GAP SERPL CALCULATED.4IONS-SCNC: 13 MMOL/L (CALC) (ref 7–17)
AST SERPL-CCNC: 14 U/L (ref 10–35)
BILIRUB SERPL-MCNC: 0.5 MG/DL (ref 0.2–1.2)
BUN SERPL-MCNC: 20 MG/DL (ref 7–25)
CALCIUM SERPL-MCNC: 10.5 MG/DL (ref 8.6–10.3)
CHLORIDE SERPL-SCNC: 103 MMOL/L (ref 98–110)
CHOLEST SERPL-MCNC: 205 MG/DL
CHOLEST/HDLC SERPL: 4 (CALC)
CO2 SERPL-SCNC: 23 MMOL/L (ref 20–32)
CREAT SERPL-MCNC: 1.07 MG/DL (ref 0.7–1.28)
CREAT UR-MCNC: 85 MG/DL (ref 20–320)
EGFRCR SERPLBLD CKD-EPI 2021: 71 ML/MIN/1.73M2
EST. AVERAGE GLUCOSE BLD GHB EST-MCNC: 174 MG/DL
EST. AVERAGE GLUCOSE BLD GHB EST-SCNC: 9.7 MMOL/L
GLUCOSE SERPL-MCNC: 147 MG/DL (ref 65–99)
HBA1C MFR BLD: 7.7 %
HDLC SERPL-MCNC: 51 MG/DL
LDLC SERPL CALC-MCNC: 114 MG/DL (CALC)
MICROALBUMIN UR-MCNC: 1.8 MG/DL
NONHDLC SERPL-MCNC: 154 MG/DL (CALC)
POTASSIUM SERPL-SCNC: 4.6 MMOL/L (ref 3.5–5.3)
PROT SERPL-MCNC: 6.8 G/DL (ref 6.1–8.1)
SODIUM SERPL-SCNC: 139 MMOL/L (ref 135–146)
TRIGL SERPL-MCNC: 299 MG/DL

## 2025-06-18 PROCEDURE — RXMED WILLOW AMBULATORY MEDICATION CHARGE

## 2025-06-20 ENCOUNTER — APPOINTMENT (OUTPATIENT)
Dept: PRIMARY CARE | Facility: CLINIC | Age: 80
End: 2025-06-20
Payer: MEDICARE

## 2025-06-20 VITALS
RESPIRATION RATE: 16 BRPM | HEART RATE: 69 BPM | HEIGHT: 72 IN | BODY MASS INDEX: 28.91 KG/M2 | DIASTOLIC BLOOD PRESSURE: 78 MMHG | OXYGEN SATURATION: 98 % | SYSTOLIC BLOOD PRESSURE: 120 MMHG | TEMPERATURE: 97.2 F | WEIGHT: 213.4 LBS

## 2025-06-20 DIAGNOSIS — I10 ESSENTIAL HYPERTENSION: ICD-10-CM

## 2025-06-20 DIAGNOSIS — E11.3293 TYPE 2 DIABETES MELLITUS WITH BOTH EYES AFFECTED BY MILD NONPROLIFERATIVE RETINOPATHY WITHOUT MACULAR EDEMA, WITHOUT LONG-TERM CURRENT USE OF INSULIN: ICD-10-CM

## 2025-06-20 DIAGNOSIS — N13.8 BENIGN PROSTATIC HYPERPLASIA WITH URINARY OBSTRUCTION: ICD-10-CM

## 2025-06-20 DIAGNOSIS — E78.2 MIXED HYPERLIPIDEMIA: ICD-10-CM

## 2025-06-20 DIAGNOSIS — K21.9 GASTROESOPHAGEAL REFLUX DISEASE WITHOUT ESOPHAGITIS: ICD-10-CM

## 2025-06-20 DIAGNOSIS — N40.1 BENIGN PROSTATIC HYPERPLASIA WITH URINARY OBSTRUCTION: ICD-10-CM

## 2025-06-20 PROCEDURE — 99214 OFFICE O/P EST MOD 30 MIN: CPT | Performed by: FAMILY MEDICINE

## 2025-06-20 PROCEDURE — 3074F SYST BP LT 130 MM HG: CPT | Performed by: FAMILY MEDICINE

## 2025-06-20 PROCEDURE — G2211 COMPLEX E/M VISIT ADD ON: HCPCS | Performed by: FAMILY MEDICINE

## 2025-06-20 PROCEDURE — 3078F DIAST BP <80 MM HG: CPT | Performed by: FAMILY MEDICINE

## 2025-06-20 PROCEDURE — 1159F MED LIST DOCD IN RCRD: CPT | Performed by: FAMILY MEDICINE

## 2025-06-20 PROCEDURE — 1160F RVW MEDS BY RX/DR IN RCRD: CPT | Performed by: FAMILY MEDICINE

## 2025-06-20 RX ORDER — LISINOPRIL 2.5 MG/1
2.5 TABLET ORAL DAILY
Qty: 90 TABLET | Refills: 2 | Status: SHIPPED | OUTPATIENT
Start: 2025-06-20

## 2025-06-20 RX ORDER — GLIPIZIDE 10 MG/1
10 TABLET ORAL
Qty: 180 TABLET | Refills: 2 | Status: SHIPPED | OUTPATIENT
Start: 2025-06-20

## 2025-06-20 RX ORDER — OMEPRAZOLE 20 MG/1
20 CAPSULE, DELAYED RELEASE ORAL DAILY
Qty: 90 CAPSULE | Refills: 2 | Status: SHIPPED | OUTPATIENT
Start: 2025-06-20

## 2025-06-20 ASSESSMENT — ENCOUNTER SYMPTOMS: VISUAL CHANGE: 1

## 2025-06-20 NOTE — ASSESSMENT & PLAN NOTE
Diabetes Mellitus type II, under poor control.  1. Rx changes:   2. Education: Reviewed ‘ABCs’ of diabetes management (respective goals in parentheses):  A1C (<7), blood pressure (<130/80), and cholesterol (LDL <100).  3. Compliance at present is estimated to be fair. Efforts to improve compliance (if necessary) will be directed at dietary modifications: and increased exercise.  4. Follow up: 3 months with fasting labs prior as ordered.

## 2025-06-20 NOTE — ASSESSMENT & PLAN NOTE
Worsening based on symptoms and exam. Treatment plan established. Disease monitoring and precautions, any treatment changes, patient instructions and follow-up are documented in encounter note.   The nature of cardiac risk has been fully discussed with this patient. Discussed cardiovascular risk analysis and appropriate diet with the need for lifelong measures to reduce the risk. A regular exercise program is recommended to help achieve and maintain normal body weight, fitness and improve lipid balance. Patient education provided. They understand and agree with this course of treatment. They will return with new or worsening symptoms. Patient instructed to remain current with appropriate annual health maintenance.

## 2025-06-20 NOTE — ASSESSMENT & PLAN NOTE
Orders have been placed for fasting Diagnostic PSA to be drawn in three months as ordered for further evaluation.

## 2025-06-20 NOTE — PROGRESS NOTES
Subjective   Patient ID: Scottie Blandon is a 79 y.o. male who presents for Follow-up (Diabetes,Hypertension,Hyperlipidemia,BPH).    Patient is here for follow-up on hypertension and hyperlipidemia. He has no chest pain, dyspnea, exertional chest pressure/discomfort, near-syncope, orthopnea, palpitations, paroxysmal nocturnal dyspnea, and syncope. Taking his medication regularly with no side effects.      Diabetes  He presents for his follow-up diabetic visit. He has type 2 diabetes mellitus. No MedicAlert identification noted. His disease course has been stable. There are no hypoglycemic associated symptoms. Pertinent negatives for hypoglycemia include no dizziness, headaches or tremors. Associated symptoms include visual change. Pertinent negatives for diabetes include no chest pain, no polydipsia and no polyuria. There are no hypoglycemic complications. Symptoms are stable. There are no diabetic complications. Risk factors for coronary artery disease include diabetes mellitus, hypertension and male sex. He is compliant with treatment all of the time. His weight is stable. He is following a generally healthy and diabetic diet. Meal planning includes avoidance of concentrated sweets. He has not had a previous visit with a dietitian. He participates in exercise intermittently. There is no change in his home blood glucose trend. Eye exam is current.        Review of Systems   Constitutional:  Negative for chills and fever.   HENT:  Negative for congestion, ear pain, nosebleeds, rhinorrhea and sore throat.    Respiratory:  Negative for cough, shortness of breath and wheezing.    Cardiovascular:  Negative for chest pain, palpitations and leg swelling.   Gastrointestinal:  Negative for abdominal pain, constipation, diarrhea and vomiting.   Endocrine: Negative for heat intolerance, polydipsia and polyuria.   Genitourinary:  Negative for dysuria, frequency and hematuria.   Neurological:  Negative for dizziness, tremors,  numbness and headaches.   Hematological:  Negative for adenopathy. Does not bruise/bleed easily.       Objective   /78   Pulse 69   Temp 36.2 °C (97.2 °F)   Resp 16   Ht 1.829 m (6')   Wt 96.8 kg (213 lb 6.4 oz)   SpO2 98%   BMI 28.94 kg/m²     Physical Exam  Constitutional:       General: He is not in acute distress.     Appearance: Normal appearance.   HENT:      Head: Normocephalic and atraumatic.      Mouth/Throat:      Mouth: Mucous membranes are moist.      Pharynx: Oropharynx is clear. No oropharyngeal exudate or posterior oropharyngeal erythema.   Eyes:      General: No scleral icterus.     Extraocular Movements: Extraocular movements intact.      Pupils: Pupils are equal, round, and reactive to light.   Cardiovascular:      Rate and Rhythm: Normal rate and regular rhythm.      Pulses: Normal pulses.      Heart sounds: No murmur heard.     No friction rub. No gallop.   Pulmonary:      Effort: Pulmonary effort is normal.      Breath sounds: No wheezing, rhonchi or rales.   Musculoskeletal:      Cervical back: Normal range of motion and neck supple.      Right lower leg: No edema.      Left lower leg: No edema.   Skin:     General: Skin is warm.      Coloration: Skin is not jaundiced or pale.      Findings: No erythema or rash.   Neurological:      General: No focal deficit present.      Mental Status: He is alert and oriented to person, place, and time.      Cranial Nerves: No cranial nerve deficit.      Sensory: No sensory deficit.      Coordination: Coordination normal.      Gait: Gait normal.       Lab Results   Component Value Date    MICROALBCREA 21 06/17/2025    CHOL 205 (H) 06/17/2025    TRIG 299 (H) 06/17/2025    HDL 51 06/17/2025    ALT 12 06/17/2025    AST 14 06/17/2025     06/17/2025    K 4.6 06/17/2025     06/17/2025    CREATININE 1.07 06/17/2025    BUN 20 06/17/2025    CO2 23 06/17/2025    HGBA1C 7.7 (H) 06/17/2025         Assessment/Plan   Problem List Items Addressed  This Visit       Benign prostatic hyperplasia with urinary obstruction    Orders have been placed for fasting Diagnostic PSA to be drawn in three months as ordered for further evaluation.           Relevant Orders    Follow Up In [facility] - PCP    Prostate Specific Antigen    Essential hypertension    Well-controlled, continue current medications and management.            Relevant Medications    lisinopril 2.5 mg tablet    Other Relevant Orders    Follow Up In [facility] - PCP    Comprehensive Metabolic Panel    Gastroesophageal reflux disease without esophagitis    Stable based on symptoms and exam. Continue established treatment plan.           Relevant Medications    omeprazole (PriLOSEC) 20 mg DR capsule    Mixed hyperlipidemia    Worsening based on symptoms and exam. Treatment plan established. Disease monitoring and precautions, any treatment changes, patient instructions and follow-up are documented in encounter note.   The nature of cardiac risk has been fully discussed with this patient. Discussed cardiovascular risk analysis and appropriate diet with the need for lifelong measures to reduce the risk. A regular exercise program is recommended to help achieve and maintain normal body weight, fitness and improve lipid balance. Patient education provided. They understand and agree with this course of treatment. They will return with new or worsening symptoms. Patient instructed to remain current with appropriate annual health maintenance.            Relevant Orders    Follow Up In [facility] - PCP    Lipid Panel    Type 2 diabetes mellitus with both eyes affected by mild nonproliferative retinopathy without macular edema, without long-term current use of insulin    Diabetes Mellitus type II, under poor control.  1. Rx changes:   2. Education: Reviewed ‘ABCs’ of diabetes management (respective goals in parentheses):  A1C (<7), blood pressure (<130/80), and cholesterol (LDL  <100).  3. Compliance at present is estimated to be fair. Efforts to improve compliance (if necessary) will be directed at dietary modifications: and increased exercise.  4. Follow up: 3 months with fasting labs prior as ordered.            Relevant Medications    glipiZIDE (Glucotrol) 10 mg tablet    Other Relevant Orders    Follow Up In [facility] - PCP    Comprehensive Metabolic Panel    Hemoglobin A1C        Scribe Attestation  By signing my name below, I, Crystal Pack, Scribe   attest that this documentation has been prepared under the direction and in the presence of Roger Wakefield MD .

## 2025-06-21 DIAGNOSIS — E78.2 MIXED HYPERLIPIDEMIA: ICD-10-CM

## 2025-06-21 DIAGNOSIS — I10 ESSENTIAL HYPERTENSION: ICD-10-CM

## 2025-06-21 DIAGNOSIS — E11.3293 TYPE 2 DIABETES MELLITUS WITH BOTH EYES AFFECTED BY MILD NONPROLIFERATIVE RETINOPATHY WITHOUT MACULAR EDEMA, WITHOUT LONG-TERM CURRENT USE OF INSULIN: ICD-10-CM

## 2025-06-21 ASSESSMENT — ENCOUNTER SYMPTOMS
BRUISES/BLEEDS EASILY: 0
CHILLS: 0
PALPITATIONS: 0
TREMORS: 0
COUGH: 0
ABDOMINAL PAIN: 0
NUMBNESS: 0
CONSTIPATION: 0
DYSURIA: 0
POLYDIPSIA: 0
SORE THROAT: 0
ADENOPATHY: 0
HEMATURIA: 0
FEVER: 0
FREQUENCY: 0
DIZZINESS: 0
HEADACHES: 0
RHINORRHEA: 0
VOMITING: 0
WHEEZING: 0
DIARRHEA: 0
SHORTNESS OF BREATH: 0

## 2025-06-23 ENCOUNTER — PHARMACY VISIT (OUTPATIENT)
Dept: PHARMACY | Facility: CLINIC | Age: 80
End: 2025-06-23
Payer: MEDICARE

## 2025-07-13 DIAGNOSIS — M1A.00X0 CHRONIC IDIOPATHIC GOUT: ICD-10-CM

## 2025-07-13 RX ORDER — ALLOPURINOL 300 MG/1
300 TABLET ORAL DAILY
Qty: 90 TABLET | Refills: 2 | Status: SHIPPED | OUTPATIENT
Start: 2025-07-13

## 2025-07-28 PROCEDURE — RXMED WILLOW AMBULATORY MEDICATION CHARGE

## 2025-07-30 ENCOUNTER — PHARMACY VISIT (OUTPATIENT)
Dept: PHARMACY | Facility: CLINIC | Age: 80
End: 2025-07-30
Payer: MEDICARE

## 2025-08-01 ENCOUNTER — HOSPITAL ENCOUNTER (OUTPATIENT)
Dept: RADIOLOGY | Facility: HOSPITAL | Age: 80
Discharge: HOME | End: 2025-08-01
Payer: MEDICARE

## 2025-08-01 DIAGNOSIS — M17.0 BILATERAL PRIMARY OSTEOARTHRITIS OF KNEE: ICD-10-CM

## 2025-08-01 DIAGNOSIS — M15.0 PRIMARY GENERALIZED (OSTEO)ARTHRITIS: ICD-10-CM

## 2025-08-01 PROCEDURE — 73562 X-RAY EXAM OF KNEE 3: CPT | Mod: 50

## 2025-08-18 ENCOUNTER — HOSPITAL ENCOUNTER (EMERGENCY)
Facility: HOSPITAL | Age: 80
Discharge: HOME | End: 2025-08-18
Payer: MEDICARE

## 2025-08-18 ENCOUNTER — APPOINTMENT (OUTPATIENT)
Dept: RADIOLOGY | Facility: HOSPITAL | Age: 80
End: 2025-08-18
Payer: MEDICARE

## 2025-08-18 VITALS
RESPIRATION RATE: 18 BRPM | HEIGHT: 72 IN | SYSTOLIC BLOOD PRESSURE: 127 MMHG | OXYGEN SATURATION: 98 % | WEIGHT: 212 LBS | BODY MASS INDEX: 28.71 KG/M2 | TEMPERATURE: 97.5 F | DIASTOLIC BLOOD PRESSURE: 62 MMHG | HEART RATE: 79 BPM

## 2025-08-18 DIAGNOSIS — M79.662 PAIN OF LEFT CALF: Primary | ICD-10-CM

## 2025-08-18 PROCEDURE — 99284 EMERGENCY DEPT VISIT MOD MDM: CPT | Mod: 25

## 2025-08-18 PROCEDURE — 93971 EXTREMITY STUDY: CPT | Performed by: RADIOLOGY

## 2025-08-18 PROCEDURE — 93971 EXTREMITY STUDY: CPT

## 2025-08-18 ASSESSMENT — LIFESTYLE VARIABLES
TOTAL SCORE: 0
HAVE PEOPLE ANNOYED YOU BY CRITICIZING YOUR DRINKING: NO
EVER HAD A DRINK FIRST THING IN THE MORNING TO STEADY YOUR NERVES TO GET RID OF A HANGOVER: NO
HAVE YOU EVER FELT YOU SHOULD CUT DOWN ON YOUR DRINKING: NO
EVER FELT BAD OR GUILTY ABOUT YOUR DRINKING: NO

## 2025-08-18 ASSESSMENT — PAIN DESCRIPTION - ONSET: ONSET: ONGOING

## 2025-08-18 ASSESSMENT — PAIN SCALES - GENERAL
PAINLEVEL_OUTOF10: 3
PAINLEVEL_OUTOF10: 3

## 2025-08-18 ASSESSMENT — PAIN - FUNCTIONAL ASSESSMENT: PAIN_FUNCTIONAL_ASSESSMENT: 0-10

## 2025-08-18 ASSESSMENT — PAIN DESCRIPTION - DESCRIPTORS: DESCRIPTORS: ACHING

## 2025-08-18 ASSESSMENT — PAIN DESCRIPTION - PAIN TYPE: TYPE: ACUTE PAIN

## 2025-08-18 ASSESSMENT — PAIN DESCRIPTION - ORIENTATION: ORIENTATION: LEFT

## 2025-08-18 ASSESSMENT — PAIN DESCRIPTION - PROGRESSION: CLINICAL_PROGRESSION: GRADUALLY IMPROVING

## 2025-09-22 ENCOUNTER — APPOINTMENT (OUTPATIENT)
Dept: PRIMARY CARE | Facility: CLINIC | Age: 80
End: 2025-09-22
Payer: MEDICARE

## 2025-11-04 ENCOUNTER — APPOINTMENT (OUTPATIENT)
Dept: PHARMACY | Facility: HOSPITAL | Age: 80
End: 2025-11-04
Payer: MEDICARE

## (undated) DEVICE — BLADE, GEN COATED 2.75, LF

## (undated) DEVICE — SUTURE, VICRYL 0, TAPER POINT, CT-1 VIOLET 27 INCH

## (undated) DEVICE — Device

## (undated) DEVICE — SUTURE, FIBERWIRE 2, T-5 TAPER NEEDLE, 38"

## (undated) DEVICE — MANIFOLD, 4 PORT NEPTUNE STANDARD

## (undated) DEVICE — GLOVE, SURGICAL, PROTEXIS PI BLUE W/NEUTHERA, 8.0, PF, LF

## (undated) DEVICE — WOUND SYSTEM, DEBRIDEMENT & CLEANING, O.R DUOPAK

## (undated) DEVICE — STRAP, ARM BOARD, 32 X 1.5

## (undated) DEVICE — SUTURE, MONOCRYL, 4-0, 27 IN, PS-2, UNDYED

## (undated) DEVICE — PREP, IODOPHOR, W/ALCOHOL, DURAPREP, W/APPLICATOR, 26 CC

## (undated) DEVICE — TUBING, IRRIGATION, HIGH FLOW, HAND PIECE SET

## (undated) DEVICE — BATH BLANKET STERILE

## (undated) DEVICE — ADHESIVE, SKIN, LIQUIBAND EXCEED

## (undated) DEVICE — DRAPE, INSTRUMENT, W/POUCH, STERI DRAPE, 7 X 11 IN, DISPOSABLE, STERILE

## (undated) DEVICE — MAT, FLOOR, STANDARD FLUID BARRIER, 32X44, GREEN

## (undated) DEVICE — BANDAGE, COFLEX, 4 X 5 YDS, FOAM TAN, STERILE, LF

## (undated) DEVICE — SOLUTION, IRRIGATION, STERILE WATER, 1000 ML, POUR BOTTLE

## (undated) DEVICE — STRAP, VELCRO, BODY, 4 X 60IN, NS

## (undated) DEVICE — BLADE, SAW, SAGITTAL, DUAL CUT, 18 X 90 X 1.27

## (undated) DEVICE — TIP, SUCTION, YANKAUER, FLEXIBLE

## (undated) DEVICE — GLOVE, SURGICAL, PROTEXIS PI , 7.5, PF, LF

## (undated) DEVICE — DRILL, 6MM CANNULATED, 15MM

## (undated) DEVICE — SOLUTION, INJECTION, USP, SODIUM CHLORIDE 0.9%, .9, NACL, 1000 ML, BAG

## (undated) DEVICE — DRESSING, MEPILEX BORDER, POST-OP AG, 4 X 10 IN

## (undated) DEVICE — DRAPE, INCISE, ANTIMICROBIAL, IOBAN 2, LARGE, 17 X 23 IN, DISPOSABLE, STERILE

## (undated) DEVICE — KIT, SCHLEIN, BEACH CHAIR, LF

## (undated) DEVICE — DRILL, REVERSE SHOULDER, 2.5MM, STERILE

## (undated) DEVICE — STOCKINETTE, IMPERVIOUS, LARGE, 9IN X 48IN